# Patient Record
Sex: FEMALE | NOT HISPANIC OR LATINO | Employment: OTHER | URBAN - METROPOLITAN AREA
[De-identification: names, ages, dates, MRNs, and addresses within clinical notes are randomized per-mention and may not be internally consistent; named-entity substitution may affect disease eponyms.]

---

## 2017-03-07 DIAGNOSIS — E11.9 TYPE 2 DIABETES MELLITUS WITHOUT COMPLICATION, WITHOUT LONG-TERM CURRENT USE OF INSULIN (H): Primary | ICD-10-CM

## 2017-03-07 NOTE — TELEPHONE ENCOUNTER
"Received note from pharmacy. \"for medicare B rx must specify QTY. I.I #100, frequency of resting & dx code. Please send in new rx with specific qty.\"      blood glucose monitoring (BETSY CONTOUR NEXT) test strip       Last Written Prescription Date: 8/22/16  Last Fill Quantity: 1, # refills: 11  Last Office Visit with [facility], [facility] or [facility] prescribing provider:  9/1/16        BP Readings from Last 3 Encounters:   09/01/16 105/60   08/22/16 110/80   01/28/16 125/81     Lab Results   Component Value Date    MICROL 6 08/22/2016     No results found for: MICROALBUMIN  Creatinine   Date Value Ref Range Status   08/22/2016 0.69 0.52 - 1.04 mg/dL Final   ]  GFR Estimate   Date Value Ref Range Status   08/22/2016 85 >60 mL/min/1.7m2 Final     Comment:     Non  GFR Calc   07/07/2016 >90  Non  GFR Calc   >60 mL/min/1.7m2 Final   12/10/2015 >90  Non  GFR Calc   >60 mL/min/1.7m2 Final     GFR Estimate If Black   Date Value Ref Range Status   08/22/2016 >90   GFR Calc   >60 mL/min/1.7m2 Final   07/07/2016 >90   GFR Calc   >60 mL/min/1.7m2 Final   12/10/2015 >90   GFR Calc   >60 mL/min/1.7m2 Final     Lab Results   Component Value Date    CHOL 157 08/22/2016     Lab Results   Component Value Date    HDL 56 08/22/2016     Lab Results   Component Value Date    LDL 82 08/22/2016     Lab Results   Component Value Date    TRIG 97 08/22/2016     Lab Results   Component Value Date    CHOLHDLRATIO 3.4 01/16/2015     Lab Results   Component Value Date    AST 28 08/22/2016     Lab Results   Component Value Date    ALT 33 08/22/2016     Lab Results   Component Value Date    A1C 6.5 07/07/2016    A1C 6.0 12/10/2015    A1C 6.9 06/08/2015    A1C 6.5 01/16/2015    A1C 6.6 04/29/2014     Potassium   Date Value Ref Range Status   08/22/2016 4.2 3.4 - 5.3 mmol/L Final       "

## 2017-05-23 DIAGNOSIS — M85.80 OSTEOPENIA: ICD-10-CM

## 2017-05-24 RX ORDER — ALENDRONATE SODIUM 70 MG/1
TABLET ORAL
Qty: 4 TABLET | Refills: 2 | Status: SHIPPED | OUTPATIENT
Start: 2017-05-24 | End: 2017-08-15

## 2017-05-24 NOTE — TELEPHONE ENCOUNTER
Routing refill request to provider for review/approval because:  Labs not current:  DEXA scan. Protocol asks for every 2 years.     alendronate (FOSAMAX) 70 MG tablet  Last Written Prescription Date: 12/23/2016  Last Fill Quantity: 4, # refills: 2  Last Office Visit with [facility], [facility] or [facility] prescribing provider: 9/1/2016     DEXA Scan:  Last order of DX HIP/PELVIS/SPINE was found on 6/11/2015 from [facility] Appointment on 6/11/2015     No order of DX HIP/PELVIS/SPINE W LAT FRACTION ANALYSIS is found.     Creatinine   Date Value Ref Range Status   08/22/2016 0.69 0.52 - 1.04 mg/dL Final     Tia ESTHER Cronin

## 2017-05-31 DIAGNOSIS — M54.50 BILATERAL LOW BACK PAIN WITHOUT SCIATICA, UNSPECIFIED CHRONICITY: ICD-10-CM

## 2017-05-31 DIAGNOSIS — M54.16 LUMBAR RADICULOPATHY: ICD-10-CM

## 2017-05-31 RX ORDER — IBUPROFEN 600 MG/1
TABLET, FILM COATED ORAL
Qty: 120 TABLET | Refills: 0 | Status: SHIPPED | OUTPATIENT
Start: 2017-05-31 | End: 2017-09-16

## 2017-05-31 NOTE — TELEPHONE ENCOUNTER
ibuprofen      Last Written Prescription Date: 9/12/16  Last Quantity: 120, # refills: 2  Last Office Visit with [facility], [facility] or [facility] prescribing provider:  9/1/16  Next 5 appointments (look out 90 days)     Jun 05, 2017  8:40 AM CDT   Return Visit with CAMILO Benavides CNP   [facility] ([facility]    [address] [place] [id number] [phone]                   Creatinine   Date Value Ref Range Status   08/22/2016 0.69 0.52 - 1.04 mg/dL Final     Lab Results   Component Value Date    AST 28 08/22/2016     Lab Results   Component Value Date    ALT 33 08/22/2016     BP Readings from Last 3 Encounters:   09/01/16 105/60   08/22/16 110/80   01/28/16 125/81     Medication filled for 3 months per protocol.      Monika Reddy RN, [facility]

## 2017-06-05 ENCOUNTER — OFFICE VISIT (OUTPATIENT)
Dept: PEDIATRICS | Facility: CLINIC | Age: 67
End: 2017-06-05
Payer: COMMERCIAL

## 2017-06-05 VITALS
BODY MASS INDEX: 26.75 KG/M2 | OXYGEN SATURATION: 97 % | SYSTOLIC BLOOD PRESSURE: 145 MMHG | WEIGHT: 141.7 LBS | HEART RATE: 67 BPM | HEIGHT: 61 IN | DIASTOLIC BLOOD PRESSURE: 80 MMHG | TEMPERATURE: 97 F

## 2017-06-05 DIAGNOSIS — E11.9 TYPE 2 DIABETES MELLITUS WITHOUT COMPLICATION, WITHOUT LONG-TERM CURRENT USE OF INSULIN (H): Primary | ICD-10-CM

## 2017-06-05 DIAGNOSIS — E78.5 HYPERLIPIDEMIA LDL GOAL <100: ICD-10-CM

## 2017-06-05 LAB
ANION GAP SERPL CALCULATED.3IONS-SCNC: 7 MMOL/L (ref 3–14)
BUN SERPL-MCNC: 13 MG/DL (ref 7–30)
CALCIUM SERPL-MCNC: 8.9 MG/DL (ref 8.5–10.1)
CHLORIDE SERPL-SCNC: 106 MMOL/L (ref 94–109)
CO2 SERPL-SCNC: 29 MMOL/L (ref 20–32)
CREAT SERPL-MCNC: 0.7 MG/DL (ref 0.52–1.04)
GFR SERPL CREATININE-BSD FRML MDRD: 83 ML/MIN/1.7M2
GLUCOSE SERPL-MCNC: 123 MG/DL (ref 70–99)
HBA1C MFR BLD: 6.4 % (ref 4.3–6)
POTASSIUM SERPL-SCNC: 4.9 MMOL/L (ref 3.4–5.3)
SODIUM SERPL-SCNC: 142 MMOL/L (ref 133–144)

## 2017-06-05 PROCEDURE — 83036 HEMOGLOBIN GLYCOSYLATED A1C: CPT | Performed by: NURSE PRACTITIONER

## 2017-06-05 PROCEDURE — 80048 BASIC METABOLIC PNL TOTAL CA: CPT | Performed by: NURSE PRACTITIONER

## 2017-06-05 PROCEDURE — 36415 COLL VENOUS BLD VENIPUNCTURE: CPT | Performed by: NURSE PRACTITIONER

## 2017-06-05 PROCEDURE — 99213 OFFICE O/P EST LOW 20 MIN: CPT | Performed by: NURSE PRACTITIONER

## 2017-06-05 RX ORDER — LANCETS
EACH MISCELLANEOUS
Qty: 200 EACH | Refills: 3 | Status: SHIPPED | OUTPATIENT
Start: 2017-06-05 | End: 2017-10-13

## 2017-06-05 NOTE — PATIENT INSTRUCTIONS
PLAN:   1.   Symptomatic therapy suggested: Continue current medications.  2.  Orders Placed This Encounter   Medications     MICROLET LANCETS MISC     Sig: USE TO TEST 1 TO 2 TIMES DAILY, AND AS NEEDED     Dispense:  200 each     Refill:  3     Orders Placed This Encounter   Procedures     Hemoglobin A1c     Basic metabolic panel       3. Patient needs to follow up in if no improvement,or sooner if worsening of symptoms or other symptoms develop.  FUTURE APPOINTMENTS:       - Make appointment with eye doctor  specialist  Schedule physical exam in September     It was a pleasure seeing you today at the [facility]. Thank you for allowing us to care for you today. We truly hope we provided you with the excellent service you deserve. Please let us know if there is anything else we can do for you so we can be sure you are leaving completley satisfied with your care experience.       General information about your clinic   Clinic Hours Lab Hours (Appointments are required)   Mon-Thurs: 7:30 AM - 7 PM Mon-Thurs: 7:30 AM - 7 PM   Fri: 7:30 AM - 5 PM Fri: 7:30 AM - 5 PM        After Hours Nurse Advise & Appts:  [facility] Nurse Advisors: [phone]  [facility] On Call: to make appointments anytime: [phone] On Call Physician: call [phone] and answering service will page the on call physician.        For urgent appointments, please call [phone] and ask for the triage nurse or your care team clinic nurse.  How to contact my care team:  MyChart: [email or web address]   Phone: [phone]   Fax: [phone]       [facility]:   Phone: [phone]  Hours: 8:00 AM - 6:00 PM  Medication Refills:  Call your pharmacy and they will forward the refill to us. Please allow 3 business days for your refills to be completed.       Normal or non-critical lab and imaging results will be communicated to you by MyChart, letter or phone within 7 days.  If you do not hear from us within 10  days, please call the clinic. If you have a critical or abnormal lab result, we will notify you by phone as soon as possible.       We now have PWIC (Pediatric Walk in Care)  Monday-Friday from 7:30-4. Simply walk in and be seen for your urgent needs like cough, fever, rash, diarrhea or vomiting, pink eye, UTI. No appointments needed. Ask one of the team for more information      -Your Care Team:    Dr. Tracey Salcedo - Internal Medicine/Pediatrics   Dr. Tory Jerome - Family Medicine  Dr. Shania Esquivel - Pediatrics  Cherie Covington CNP - Family Practice Nurse Practitioner  Dr. Em Hickman - Pediatrics  Dr. Michael Duque - Internal Medicine

## 2017-06-05 NOTE — PROGRESS NOTES
SUBJECTIVE:                                                    Rissa Acosta is a 67 year old female who presents to clinic today for the following health issues:  This patient is accompanied in the office by her .    Diabetes Follow-up    Patient is checking blood sugars: three times a week about twice   Blood sugar testing frequency justification: Patient modifying lifestyle changes (diet, exercise) with blood sugars  Results are as follows:         am - 100         lunchtime -          suppertime -     Diabetic concerns: none     Symptoms of hypoglycemia (low blood sugar): ringing in the ears     Paresthesias (numbness or burning in feet) or sores: No     Date of last diabetic eye exam: 9/1/2015       Amount of exercise or physical activity: walking everymorning    Problems taking medications regularly: No    Medication side effects: none    Diet: diabetic      Hyperlipidemia Follow-Up      Rate your low fat/cholesterol diet?: good    Taking statin?  Yes, no muscle aches from statin    Other lipid medications/supplements?:  none       Problem list and histories reviewed & adjusted, as indicated.  Additional history: as documented    Patient Active Problem List   Diagnosis     CARDIOVASCULAR SCREENING; LDL GOAL LESS THAN 160     DDD (degenerative disc disease), cervical     DDD (degenerative disc disease), lumbar     Vitamin D insufficiency     Elevated LFTs     Positive PPD     Bilateral low back pain without sciatica     Lumbar radiculopathy     Advanced directives, counseling/discussion     Closed fracture of lumbar vertebra (H)     Spinal stenosis of lumbar region     Viral hepatitis     Type 2 diabetes, HbA1c goal < 7% (H)     Osteopenia     Neuropathic pain, leg     Exposure to hepatitis B     Spinal stenosis of lumbar region without neurogenic claudication     Osteoporosis     Hyperlipidemia LDL goal <100     Past Surgical History:   Procedure Laterality Date     BACK SURGERY  2011     [facility]     CATARACT IOL, RT/LT Right 2005       Social History   Substance Use Topics     Smoking status: Never Smoker     Smokeless tobacco: Never Used     Alcohol use No     Family History   Problem Relation Age of Onset     DIABETES No family hx of      Macular Degeneration No family hx of      Glaucoma No family hx of      Strabismus No family hx of          Current Outpatient Prescriptions   Medication Sig Dispense Refill     ibuprofen (ADVIL/MOTRIN) 600 MG tablet TAKE ONE TABLET BY MOUTH EVERY 12 HOURS AS NEEDED FOR MODERATE PAIN 120 tablet 0     alendronate (FOSAMAX) 70 MG tablet TAKE 1 TAB BY MOUTH EVERY 7 DAYS 1 HR BEFORE BREAKFAST, WITH 8 OZ OF WATER. STAY UPRIGHT FOR 30 MIN 4 tablet 2     blood glucose monitoring (BETSY CONTOUR NEXT) test strip Use to test blood sugar 2 times daily or as directed. 100 strip 3     diclofenac (VOLTAREN) 1 % GEL topical gel APPLY 4 GRAMS TWICE DAILY USING ENCLOSED DOSING CARD. 100 g 4     MICROLET LANCETS MISC USE TO TEST 1 TO 2 TIMES DAILY, AND AS NEEDED 200 each 0     pravastatin (PRAVACHOL) 20 MG tablet Take 1 tablet (20 mg) by mouth daily 90 tablet 3     ORDER FOR DME Equipment being ordered: TENS 1 Units 0     Multiple Vitamins-Minerals (CHOICEFUL MULTIVITAMIN PO)        ascorbic acid (VITAMIN C) 500 MG tablet Take by mouth daily       B Complex Vitamins (VITAMIN-B COMPLEX PO)        Omega-3 Fatty Acids (FISH OIL CONCENTRATE PO)        ORDER FOR DME Equipment being ordered: walker  Wheeled and seated if needed (Patient not taking: Reported on 6/5/2017) 1 each 0     No Known Allergies  Recent Labs   Lab Test  08/22/16   1044  07/07/16   1126  12/10/15   0848  06/08/15   0835   09/27/13   0724   A1C   --   6.5*  6.0  6.9*   < >   --    LDL  82  131*  151*   --    < >  159*   HDL  56  52  71   --    < >  55   TRIG  97  143  100   --    < >  94   ALT  33  29  32  43   < >  54*   CR  0.69  0.62  0.60  0.64   < >  0.60   GFRESTIMATED  85  >90  Non   "American GFR Calc    >90  Non  GFR Calc    >90  Non  GFR Calc     < >  >90   GFRESTBLACK  >90   GFR Calc    >90   GFR Calc    >90   GFR Calc    >90   GFR Calc     < >  >90   POTASSIUM  4.2  4.3  4.0  4.1   < >  4.7   TSH  0.92   --    --    --    --   1.18    < > = values in this interval not displayed.      BP Readings from Last 3 Encounters:   06/05/17 145/80   09/01/16 105/60   08/22/16 110/80    Wt Readings from Last 3 Encounters:   06/05/17 141 lb 11.2 oz (64.3 kg)   09/01/16 139 lb 4.8 oz (63.2 kg)   08/22/16 136 lb 6.4 oz (61.9 kg)               Reviewed and updated as needed this visit by clinical staff  Tobacco  Allergies  Med Hx  Surg Hx  Fam Hx  Soc Hx      Reviewed and updated as needed this visit by Provider         ROS:  CONSTITUTIONAL:NEGATIVE for fever, chills, change in weight  INTEGUMENTARY/SKIN: NEGATIVE for worrisome rashes, moles or lesions  ENT/MOUTH: NEGATIVE for ear, mouth and throat problems  RESP:NEGATIVE for significant cough or SOB  CV: NEGATIVE for chest pain, palpitations or peripheral edema  GI: NEGATIVE for nausea, poor appetite and vomiting  MUSCULOSKELETAL: POSITIVE  for radicular pain into her right leg and is doing OK  and NEGATIVE for joint swelling  and joint warmth   NEURO: POSITIVE for radicular pain intermittently  and NEGATIVE for involuntary movements, gait disturbance and loss of consciousness  ENDOCRINE: POSITIVE  for HX diabetes and NEGATIVE for polydipsia, polyphagia and polyuria    OBJECTIVE:                                                    /80 (BP Location: Right arm, Patient Position: Chair, Cuff Size: Adult Regular)  Pulse 67  Temp 97  F (36.1  C) (Temporal)  Ht 5' 1\" (1.549 m)  Wt 141 lb 11.2 oz (64.3 kg)  SpO2 97%  Breastfeeding? No  BMI 26.77 kg/m2  Body mass index is 26.77 kg/(m^2).   Wt Readings from Last 4 Encounters:   06/05/17 141 lb 11.2 oz " (64.3 kg)   09/01/16 139 lb 4.8 oz (63.2 kg)   08/22/16 136 lb 6.4 oz (61.9 kg)   12/23/15 134 lb 9.6 oz (61.1 kg)       GENERAL: Patient is well nourished, well developed,in no apparent distress, non-toxic, in no respiratory distress and acyanotic, alert, cooperative and well hydrated  EYES: Eyes grossly normal to inspection  HENT:ear canals and TM's normal and nose and mouth without ulcers or lesions   NECK:normal, supple and no adenopathy  CARDIAC:regular rates and rhythm, no murmur, click or rub and no irregular beats  without LE edema bilaterally  RESP: normal respiratory rate and rhythm, lungs clear to auscultation  unlabored respirations, no intercostal retractions or accessory muscle use  ABD:soft, nontender  SKIN: Skin color, texture, turgor normal. No rashes or lesions.  MS: extremities normal- no gross deformities noted, gait normal and normal muscle tone  NEURO: mentation intact and speech normal  PSYCH: Alert, oriented, thought content appropriate,mentation appears normal., affect and mood normal      Diagnostic Test Results:  Results for orders placed or performed in visit on 06/05/17 (from the past 24 hour(s))   Hemoglobin A1c   Result Value Ref Range    Hemoglobin A1C 6.4 (H) 4.3 - 6.0 %        ASSESSMENT/PLAN:                                                      Rissa was seen today for diabetes.    Diagnoses and all orders for this visit:    Type 2 diabetes mellitus without complication, without long-term current use of insulin (H)  -     MICROLET LANCETS MISC; USE TO TEST 1 TO 2 TIMES DAILY, AND AS NEEDED  -     Hemoglobin A1c  -     Basic metabolic panel  foot care discussed and Podiatry visits discussed, annual eye examinations at Ophthalmology discussed, glycohemoglobin monitoring discussed and long term diabetic complications discussed  No changes in current regimen  Referral to Ophthalmology    Hyperlipidemia LDL goal <100  Continue current medications.    PLAN:   Patient needs to follow up in  if no improvement,or sooner if worsening of symptoms or other symptoms develop.  FUTURE APPOINTMENTS:       - Make appointment with eye doctor  specialist  Schedule physical exam in September       See Patient Instructions    CAMILO Benavides CNP  [facility]

## 2017-06-05 NOTE — MR AVS SNAPSHOT
After Visit Summary   6/5/2017    Rissa Acosta    MRN: [id number]           Patient Information     Date Of Birth          1950        Visit Information        Provider Department      6/5/2017 8:30 AM Cherie Covington APRN CNP; [employer] [facility]        Today's Diagnoses     Type 2 diabetes mellitus without complication, without long-term current use of insulin (H)    -  1    Hyperlipidemia LDL goal <100          Care Instructions    PLAN:   1.   Symptomatic therapy suggested: Continue current medications.  2.  Orders Placed This Encounter   Medications     MICROLET LANCETS MISC     Sig: USE TO TEST 1 TO 2 TIMES DAILY, AND AS NEEDED     Dispense:  200 each     Refill:  3     Orders Placed This Encounter   Procedures     Hemoglobin A1c     Basic metabolic panel       3. Patient needs to follow up in if no improvement,or sooner if worsening of symptoms or other symptoms develop.  FUTURE APPOINTMENTS:       - Make appointment with eye doctor  specialist  Schedule physical exam in September     It was a pleasure seeing you today at the [facility]. Thank you for allowing us to care for you today. We truly hope we provided you with the excellent service you deserve. Please let us know if there is anything else we can do for you so we can be sure you are leaving completley satisfied with your care experience.       General information about your clinic   Clinic Hours Lab Hours (Appointments are required)   Mon-Thurs: 7:30 AM - 7 PM Mon-Thurs: 7:30 AM - 7 PM   Fri: 7:30 AM - 5 PM Fri: 7:30 AM - 5 PM        After Hours Nurse Advise & Appts:  [facility] Nurse Advisors: [phone]  [facility] On Call: to make appointments anytime: [phone] On Call Physician: call [phone] and answering service will page the on call physician.        For urgent appointments, please call [phone] and ask for the triage nurse or your care  team clinic nurse.  How to contact my care team:  [facility]: [email or web address]   Phone: [phone]   Fax: [phone]       [facility]:   Phone: [phone]  Hours: 8:00 AM - 6:00 PM  Medication Refills:  Call your pharmacy and they will forward the refill to us. Please allow 3 business days for your refills to be completed.       Normal or non-critical lab and imaging results will be communicated to you by MyChart, letter or phone within 7 days.  If you do not hear from us within 10 days, please call the clinic. If you have a critical or abnormal lab result, we will notify you by phone as soon as possible.       We now have PWIC (Pediatric Walk in Care)  Monday-Friday from 7:30-4. Simply walk in and be seen for your urgent needs like cough, fever, rash, diarrhea or vomiting, pink eye, UTI. No appointments needed. Ask one of the team for more information      -Your Care Team:    Dr. Tracey Salcedo - Internal Medicine/Pediatrics   Dr. Tory Jerome - Family Medicine  Dr. Shania Esquivel - Pediatrics  Cherie Covington CNP - Family Practice Nurse Practitioner  Dr. Em Hickman - Pediatrics  Dr. Michael Duque - Internal Medicine                      Follow-ups after your visit        Who to contact     If you have questions or need follow up information about today's clinic visit or your schedule please contact [facility] directly at [phone].  Normal or non-critical lab and imaging results will be communicated to you by MyChart, letter or phone within 4 business days after the clinic has received the results. If you do not hear from us within 7 days, please contact the clinic through [employer] or phone. If you have a critical or abnormal lab result, we will notify you by phone as soon as possible.  Submit refill requests through [employer] or call your pharmacy and they will forward the refill request to us. Please allow 3 business days for your refill to be completed.          Additional  "Information About Your Visit        [employer] [employer] [employer] Information     [employer] gives you secure access to your electronic health record. If you see a primary care provider, you can also send messages to your care team and make appointments. If you have questions, please call your primary care clinic.  If you do not have a primary care provider, please call [phone] and they will assist you.      [employer] is an electronic gateway that provides easy, online access to your medical records. With [employer], you can request a clinic appointment, read your test results, renew a prescription or communicate with your care team.     To access your existing account, please contact your [facility] or call [phone] for assistance.        Care EveryWhere ID     This is your Care EveryWhere ID. This could be used by other organizations to access your [facility] medical records  [id number]        Your Vitals Were     Pulse Temperature Height Pulse Oximetry Breastfeeding? BMI (Body Mass Index)    67 97  F (36.1  C) (Temporal) 5' 1\" (1.549 m) 97% No 26.77 kg/m2       Blood Pressure from Last 3 Encounters:   06/05/17 145/80   09/01/16 105/60   08/22/16 110/80    Weight from Last 3 Encounters:   06/05/17 141 lb 11.2 oz (64.3 kg)   09/01/16 139 lb 4.8 oz (63.2 kg)   08/22/16 136 lb 6.4 oz (61.9 kg)              We Performed the Following     Basic metabolic panel     Hemoglobin A1c          Today's Medication Changes          These changes are accurate as of: 6/5/17  9:15 AM.  If you have any questions, ask your nurse or doctor.               These medicines have changed or have updated prescriptions.        Dose/Directions    MICROLET LANCETS Misc   This may have changed:  See the new instructions.   Used for:  Type 2 diabetes mellitus without complication, without long-term current use of insulin (H)   Changed by:  Cherie Covington APRN CNP        USE TO TEST 1 TO 2 TIMES DAILY, AND AS NEEDED   Quantity: "  200 each   Refills:  3            Where to get your medicines      These medications were sent to [facility]/pharmacy #[id number] - [place], [place] - [address]  [address] [facility] [address]     Phone:  [phone]     MICROLET LANCETS [facility]                Primary Care Provider Office Phone # Fax #    Cherie Covington, CAMILO [phone]       [facility] [address]  [place] [address]        Thank you!     Thank you for choosing [facility]  for your care. Our goal is always to provide you with excellent care. Hearing back from our patients is one way we can continue to improve our services. Please take a few minutes to complete the written survey that you may receive in the mail after your visit with us. Thank you!             Your Updated Medication List - Protect others around you: Learn how to safely use, store and throw away your medicines at www.disposemymeds.org.          This list is accurate as of: 6/5/17  9:15 AM.  Always use your most recent med list.                   Brand Name Dispense Instructions for use    alendronate 70 MG tablet    FOSAMAX    4 tablet    TAKE 1 TAB BY MOUTH EVERY 7 DAYS 1 HR BEFORE BREAKFAST, WITH 8 OZ OF WATER. STAY UPRIGHT FOR 30 MIN       ascorbic acid 500 MG tablet    VITAMIN C     Take by mouth daily       blood glucose monitoring test strip    BETSY CONTOUR NEXT    100 strip    Use to test blood sugar 2 times daily or as directed.       CHOICEFUL MULTIVITAMIN PO          diclofenac 1 % Gel topical gel    VOLTAREN    100 g    APPLY 4 GRAMS TWICE DAILY USING ENCLOSED DOSING CARD.       FISH OIL CONCENTRATE PO          ibuprofen 600 MG tablet    ADVIL/MOTRIN    120 tablet    TAKE ONE TABLET BY MOUTH EVERY 12 HOURS AS NEEDED FOR MODERATE PAIN       MICROLET LANCETS Misc     200 each    USE TO TEST 1 TO 2 TIMES DAILY, AND AS NEEDED       * order for DME     1 Units    Equipment being ordered: TENS       *  order for DME     1 each    Equipment being ordered: walker  Wheeled and seated if needed       pravastatin 20 MG tablet    PRAVACHOL    90 tablet    Take 1 tablet (20 mg) by mouth daily       VITAMIN-B COMPLEX PO          * Notice:  This list has 2 medication(s) that are the same as other medications prescribed for you. Read the directions carefully, and ask your doctor or other care provider to review them with you.

## 2017-06-05 NOTE — NURSING NOTE
"Chief Complaint   Patient presents with     Diabetes       Initial /80 (BP Location: Right arm, Patient Position: Chair, Cuff Size: Adult Regular)  Pulse 67  Temp 97  F (36.1  C) (Temporal)  Ht 5' 1\" (1.549 m)  Wt 141 lb 11.2 oz (64.3 kg)  SpO2 97%  Breastfeeding? No  BMI 26.77 kg/m2 Estimated body mass index is 26.77 kg/(m^2) as calculated from the following:    Height as of this encounter: 5' 1\" (1.549 m).    Weight as of this encounter: 141 lb 11.2 oz (64.3 kg).  Medication Reconciliation: complete      CHRIS Holt      "

## 2017-06-05 NOTE — PROGRESS NOTES
Kishor Acosta,    Attached are your test results.  -Kidney function is normal (Cr, GFR), Sodium is normal, Potassium is normal, Calcium is normal,   -A1C (test of diabetes control the last 2-3 months) is at your goal. Please continue with current plan. Also, see me and recheck your A1C test in 6 months.    Please contact us if you have any questions.    Cherie Covington, CNP

## 2017-07-10 ENCOUNTER — OFFICE VISIT (OUTPATIENT)
Dept: OPHTHALMOLOGY | Facility: CLINIC | Age: 67
End: 2017-07-10
Attending: OPHTHALMOLOGY
Payer: COMMERCIAL

## 2017-07-10 DIAGNOSIS — H04.123 DRY EYES, BILATERAL: ICD-10-CM

## 2017-07-10 DIAGNOSIS — H25.13 AGE-RELATED NUCLEAR CATARACT OF BOTH EYES: ICD-10-CM

## 2017-07-10 DIAGNOSIS — H52.4 PRESBYOPIA: Primary | ICD-10-CM

## 2017-07-10 DIAGNOSIS — H52.7 REFRACTIVE ERROR: ICD-10-CM

## 2017-07-10 PROCEDURE — 92015 DETERMINE REFRACTIVE STATE: CPT | Mod: ZF

## 2017-07-10 PROCEDURE — 99214 OFFICE O/P EST MOD 30 MIN: CPT | Mod: ZF

## 2017-07-10 ASSESSMENT — VISUAL ACUITY
CORRECTION_TYPE: GLASSES
OD_PH_CC: 20/30
METHOD: SNELLEN - LINEAR
OS_CC: 20/40
OD_CC: J1+
OD_CC: 20/40
OS_CC: J2

## 2017-07-10 ASSESSMENT — REFRACTION_WEARINGRX
OD_SPHERE: +3.00
OS_CYLINDER: +0.75
OD_CYLINDER: +0.75
OD_AXIS: 157
OS_AXIS: 037
SPECS_TYPE: BIFOCAL
OD_ADD: +3.00
OS_SPHERE: +3.25
OS_ADD: +3.00

## 2017-07-10 ASSESSMENT — REFRACTION_MANIFEST
OS_SPHERE: +3.50
OD_AXIS: 155
OS_AXIS: 040
OD_SPHERE: +3.50
OD_CYLINDER: +0.75
OS_ADD: +2.50
OD_ADD: +2.50
OS_CYLINDER: +0.50

## 2017-07-10 ASSESSMENT — EXTERNAL EXAM - LEFT EYE: OS_EXAM: NORMAL

## 2017-07-10 ASSESSMENT — CUP TO DISC RATIO
OS_RATIO: 0.3
OD_RATIO: 0.2

## 2017-07-10 ASSESSMENT — TONOMETRY
IOP_METHOD: TONOPEN
OS_IOP_MMHG: 12
OD_IOP_MMHG: 11

## 2017-07-10 ASSESSMENT — EXTERNAL EXAM - RIGHT EYE: OD_EXAM: NORMAL

## 2017-07-10 ASSESSMENT — CONF VISUAL FIELD
OS_NORMAL: 1
OD_NORMAL: 1

## 2017-07-10 ASSESSMENT — SLIT LAMP EXAM - LIDS
COMMENTS: DERMATOCHALASIS
COMMENTS: DERMATOCHALASIS

## 2017-07-10 NOTE — NURSING NOTE
Chief Complaints and History of Present Illnesses   Patient presents with     COMPREHENSIVE EYE EXAM     HPI    Affected eye(s):  Both   Symptoms:     No blurred vision   No decreased vision   No floaters   No flashes   No tearing   No Dryness      Duration:  2 years   Frequency:  Constant       Do you have eye pain now?:  No      Comments:  States va is the same since last visit.                        A1C                      6.4                 06/05/2017                 A1C                      6.5                 07/07/2016                 A1C                      6.0                 12/10/2015                 A1C                      6.9                 06/08/2015                 A1C                      6.5                 01/16/2015    BS: 100-150            Kane Camilo  10:10 AM July 10, 2017

## 2017-07-10 NOTE — PROGRESS NOTES
HPI:  Rissa Acosta is a 67 year old female who presents for annual dilated exam. She has a history of hyperopia and DM managed with diet/exercise. Her last A1C was 6.4 on 6/5/17. She is complaining of slightly blurry vision, unable to elucidate at near or distance. She is complaining of dryness/itching. Improved with AT. Denies redness or tearing. Denies flashes or floaters.     Patient does not drive. She is able to complete all of her ADLs.      POHx: hyperopia  PMHx: back pain, spinal stenosis, vitamin D deficiency, HLD  Current Medications:   Current Outpatient Prescriptions on File Prior to Visit:  MICROLET LANCETS MISC USE TO TEST 1 TO 2 TIMES DAILY, AND AS NEEDED   ibuprofen (ADVIL/MOTRIN) 600 MG tablet TAKE ONE TABLET BY MOUTH EVERY 12 HOURS AS NEEDED FOR MODERATE PAIN   alendronate (FOSAMAX) 70 MG tablet TAKE 1 TAB BY MOUTH EVERY 7 DAYS 1 HR BEFORE BREAKFAST, WITH 8 OZ OF WATER. STAY UPRIGHT FOR 30 MIN   blood glucose monitoring (BETSY CONTOUR NEXT) test strip Use to test blood sugar 2 times daily or as directed.   diclofenac (VOLTAREN) 1 % GEL topical gel APPLY 4 GRAMS TWICE DAILY USING ENCLOSED DOSING CARD.   pravastatin (PRAVACHOL) 20 MG tablet Take 1 tablet (20 mg) by mouth daily   ORDER FOR DME Equipment being ordered: walker  Wheeled and seated if needed   ORDER FOR DME Equipment being ordered: TENS   Multiple Vitamins-Minerals (CHOICEFUL MULTIVITAMIN PO)    ascorbic acid (VITAMIN C) 500 MG tablet Take by mouth daily   B Complex Vitamins (VITAMIN-B COMPLEX PO)    Omega-3 Fatty Acids (FISH OIL CONCENTRATE PO)      No current facility-administered medications on file prior to visit.   FHx: family history of glaucoma (1 sister)  PSHx: ?eye surgery in [place]      Current Eye Medications:  None    Assessment & Plan:  (H52.4) Hyperopia with Presbyopia and astigmatism (primary encounter diagnosis)  Comment: BCVA 20/30; 20/20  Plan: updated MRx dispensed    (H25.13) Age-related nuclear cataract of both  eyes  Comment: BCVA 20/30; 20/20  Plan: likely becoming visually significant  Continue to monitor    Dry eyes  Continue artificial tears four times daily    Disposition:  Return in about 1 year (around 7/10/2018) for Annual Visit.    Patient discussed with faculty MD Noah Healy MD  PGY2, Dept of Ophthalmology  Pager [phone]      Teaching statement:  Complete documentation of historical and exam elements from today's encounter can be found in the full encounter summary report (not reduplicated in this progress note). I personally obtained the chief complaint(s) and history of present illness.  I confirmed and edited as necessary the review of systems, past medical/surgical history, family history, social history, and examination findings as documented by others; and I examined the patient myself. I personally reviewed the relevant tests, images, and reports as documented above.     I formulated and edited as necessary the assessment and plan and discussed the findings and management plan with the patient and family.    Sabi Rosales MD  Comprehensive Ophthalmology & Ocular Pathology  Department of Ophthalmology and Visual Neurosciences  [email or web address]  Pager [phone]

## 2017-07-10 NOTE — MR AVS SNAPSHOT
After Visit Summary   7/10/2017    Rissa Acosta    MRN: [id number]           Patient Information     Date Of Birth          1950        Visit Information        Provider Department      7/10/2017 9:15 AM Open, Assignments; [facility] MD [facility]        Today's Diagnoses     Presbyopia    -  1    Refractive error        Age-related nuclear cataract of both eyes        Dry eyes, bilateral           Follow-ups after your visit        Follow-up notes from your care team     Return in about 1 year (around 7/10/2018) for Annual Visit.      Who to contact     Please call your clinic at [phone] to:    Ask questions about your health    Make or cancel appointments    Discuss your medicines    Learn about your test results    Speak to your doctor   If you have compliments or concerns about an experience at your clinic, or if you wish to file a complaint, please contact [facility] Patient Relations at [phone] or email us at [email or web address]         Additional Information About Your Visit        MyChart Information     [employer] gives you secure access to your electronic health record. If you see a primary care provider, you can also send messages to your care team and make appointments. If you have questions, please call your primary care clinic.  If you do not have a primary care provider, please call [phone] and they will assist you.      [employer]less is an electronic gateway that provides easy, online access to your medical records. With [employer], you can request a clinic appointment, read your test results, renew a prescription or communicate with your care team.     To access your existing account, please contact your [facility] or call [phone] for assistance.        Care EveryWhere ID     This is your Care EveryWhere ID. This could be used by other organizations to access your [facility]  records  [id number]         Blood Pressure from Last 3 Encounters:   06/05/17 145/80   09/01/16 105/60   08/22/16 110/80    Weight from Last 3 Encounters:   06/05/17 64.3 kg (141 lb 11.2 oz)   09/01/16 63.2 kg (139 lb 4.8 oz)   08/22/16 61.9 kg (136 lb 6.4 oz)              Today, you had the following     No orders found for display       Primary Care Provider Office Phone # Fax #    Cherie Covington, APRN [phone]       [facility] [address]  [place] [address]        Equal Access to Services     MARIVEL RENEE : Hadii aad wally hadasho Sogucciali, waaxda luqadaha, qaybta kaalmada adeegyada, jayson jordan . So [facility] [phone].    ATENCIÓN: Si habla español, tiene a espinoza disposición servicios gratuitos de asistencia lingüística. Llame al [phone].    We comply with applicable federal civil rights laws and [place] laws. We do not discriminate on the basis of race, color, national origin, age, disability sex, sexual orientation or gender identity.            Thank you!     Thank you for choosing EYE CLINIC  for your care. Our goal is always to provide you with excellent care. Hearing back from our patients is one way we can continue to improve our services. Please take a few minutes to complete the written survey that you may receive in the mail after your visit with us. Thank you!             Your Updated Medication List - Protect others around you: Learn how to safely use, store and throw away your medicines at www.disposemymeds.org.          This list is accurate as of: 7/10/17 11:15 AM.  Always use your most recent med list.                   Brand Name Dispense Instructions for use Diagnosis    alendronate 70 MG tablet    FOSAMAX    4 tablet    TAKE 1 TAB BY MOUTH EVERY 7 DAYS 1 HR BEFORE BREAKFAST, WITH 8 OZ OF WATER. STAY UPRIGHT FOR 30 MIN    Osteopenia       ascorbic acid 500 MG tablet    VITAMIN C     Take by mouth daily    Lumbar  radiculopathy, LBP (low back pain), Chronic pain disorder, Neuropathic pain, leg, unspecified laterality, Closed fracture of lumbar vertebra, sequela, Lumbar radicular pain, Right leg pain       blood glucose monitoring test strip    BETSY CONTOUR NEXT    100 strip    Use to test blood sugar 2 times daily or as directed.    Type 2 diabetes mellitus without complication, without long-term current use of insulin (H)       CHOICEFUL MULTIVITAMIN PO       Lumbar radiculopathy, LBP (low back pain), Chronic pain disorder, Neuropathic pain, leg, unspecified laterality, Closed fracture of lumbar vertebra, sequela, Lumbar radicular pain, Right leg pain       diclofenac 1 % Gel topical gel    VOLTAREN    100 g    APPLY 4 GRAMS TWICE DAILY USING ENCLOSED DOSING CARD.    DDD (degenerative disc disease), cervical, DDD (degenerative disc disease), lumbar       FISH OIL CONCENTRATE PO       Lumbar radiculopathy, LBP (low back pain), Chronic pain disorder, Neuropathic pain, leg, unspecified laterality, Closed fracture of lumbar vertebra, sequela, Lumbar radicular pain, Right leg pain       ibuprofen 600 MG tablet    ADVIL/MOTRIN    120 tablet    TAKE ONE TABLET BY MOUTH EVERY 12 HOURS AS NEEDED FOR MODERATE PAIN    Bilateral low back pain without sciatica, unspecified chronicity, Lumbar radiculopathy       MICROLET LANCETS Misc     200 each    USE TO TEST 1 TO 2 TIMES DAILY, AND AS NEEDED    Type 2 diabetes mellitus without complication, without long-term current use of insulin (H)       * order for DME     1 Units    Equipment being ordered: TENS    Neuropathic pain, leg, unspecified laterality, LBP (low back pain), Lumbar radiculopathy       * order for DME     1 each    Equipment being ordered: walker  Wheeled and seated if needed    LBP (low back pain), DDD (degenerative disc disease), lumbar, Neuropathic pain, leg, unspecified laterality       pravastatin 20 MG tablet    PRAVACHOL    90 tablet    Take 1 tablet (20 mg) by mouth  daily    Hyperlipidemia LDL goal <100       VITAMIN-B COMPLEX PO       Lumbar radiculopathy, LBP (low back pain), Chronic pain disorder, Neuropathic pain, leg, unspecified laterality, Closed fracture of lumbar vertebra, sequela, Lumbar radicular pain, Right leg pain       * Notice:  This list has 2 medication(s) that are the same as other medications prescribed for you. Read the directions carefully, and ask your doctor or other care provider to review them with you.

## 2017-08-15 DIAGNOSIS — M81.0 AGE-RELATED OSTEOPOROSIS WITHOUT CURRENT PATHOLOGICAL FRACTURE: Primary | ICD-10-CM

## 2017-08-16 RX ORDER — ALENDRONATE SODIUM 70 MG/1
TABLET ORAL
Qty: 4 TABLET | Refills: 2 | Status: SHIPPED | OUTPATIENT
Start: 2017-08-16 | End: 2017-10-13

## 2017-08-16 NOTE — TELEPHONE ENCOUNTER
Routing refill request to provider for review/approval because:  Labs not current:  DEXA scan    alendronate (FOSAMAX) 70 MG tablet  Last Written Prescription Date: 5/24/2017  Last Fill Quantity: 4, # refills: 2  Last Office Visit with [facility], [facility] or [facility] prescribing provider: 6/5/2017     DEXA Scan:  Last order of DX HIP/PELVIS/SPINE was found on 6/11/2015 from [facility] Appointment on 6/11/2015     No order of DX HIP/PELVIS/SPINE W LAT FRACTION ANALYSIS is found.     Creatinine   Date Value Ref Range Status   06/05/2017 0.70 0.52 - 1.04 mg/dL Final       Tia ESTHER Cronin

## 2017-08-16 NOTE — TELEPHONE ENCOUNTER
Refill completed.   Please double check with patient how long total she has been on the Fosamax and also placed order for repeat DEXA   Thanks

## 2017-08-16 NOTE — TELEPHONE ENCOUNTER
Patients daughter Mojgan advised of the information below per Liane Covington.  Mojgan states she has no idea how long her mom has been on Fosamax.  Mojgan states her mom would have no idea either.  Mojgan will call radiology to schedule DEXA, number given.    Annie [facility]    Message routed to Liane Covington as an FYI

## 2017-09-13 ENCOUNTER — RADIANT APPOINTMENT (OUTPATIENT)
Dept: MAMMOGRAPHY | Facility: CLINIC | Age: 67
End: 2017-09-13
Attending: NURSE PRACTITIONER
Payer: COMMERCIAL

## 2017-09-13 DIAGNOSIS — Z12.39 SCREENING FOR BREAST CANCER: ICD-10-CM

## 2017-09-13 DIAGNOSIS — E78.5 HYPERLIPIDEMIA LDL GOAL <100: ICD-10-CM

## 2017-09-13 PROCEDURE — G0202 SCR MAMMO BI INCL CAD: HCPCS | Performed by: STUDENT IN AN ORGANIZED HEALTH CARE EDUCATION/TRAINING PROGRAM

## 2017-09-13 RX ORDER — PRAVASTATIN SODIUM 20 MG
TABLET ORAL
Qty: 30 TABLET | Refills: 0 | Status: SHIPPED | OUTPATIENT
Start: 2017-09-13 | End: 2017-10-13

## 2017-09-13 NOTE — LETTER
September 13, 2017      Rissa Acosta  [address] [place] [address]  [address]  [facility] [address]              Dear Rissa,      Thank you for your refill request. We recently received a call from your pharmacy requesting a refill of your medication. We have provided a 30 day refill of your medication, pravastatin (PRAVACHOL) 20 MG tablet, as requested.      However, our records show it is time for annual laboratory monitoring with Cherie Covington CNP to ensure your treatment is safe and effective for you.      Regular follow up, including visits with your health care provider and laboratory monitoring are necessary to make sure your medication is working properly.    You can reach us at [phone] or schedule online via [employer].    Thank you for taking an active role in your healthcare.    Sincerely,  Cally Cronin RN,   [facility]  Cherie Covington MD

## 2017-09-13 NOTE — TELEPHONE ENCOUNTER
pravastatin (PRAVACHOL) 20 MG tablet   Last Written Prescription Date: 8/22/2016  Last Fill Quantity: 90, # refills: 3  Last Office Visit with [facility] or [facility] prescribing provider: 6/5/2017       Lab Results   Component Value Date    CHOL 157 08/22/2016     Lab Results   Component Value Date    HDL 56 08/22/2016     Lab Results   Component Value Date    LDL 82 08/22/2016     Lab Results   Component Value Date    TRIG 97 08/22/2016     Lab Results   Component Value Date    CHOLHDLRATIO 3.4 01/16/2015     30 day kaitlynn refill and letter sent to patient. Cally Cronin RN

## 2017-09-16 DIAGNOSIS — M54.16 LUMBAR RADICULOPATHY: ICD-10-CM

## 2017-09-16 DIAGNOSIS — M54.50 BILATERAL LOW BACK PAIN WITHOUT SCIATICA, UNSPECIFIED CHRONICITY: ICD-10-CM

## 2017-09-16 NOTE — LETTER
September 18, 2017      Rissa Acosta  [address]  [address]  [place] [facility] [address]              Dear Rissa,      Thank you for your refill request. We recently received a call from your pharmacy requesting a refill of your medication. We have provided a 30 day refill of your medication, ibuprofen (ADVIL/MOTRIN) 600 MG tablet, as requested.      However, our records show it is time for annual laboratory monitoring with Cherie Covington CNP to ensure your treatment is safe and effective for you.      Regular follow up, including visits with your health care provider and laboratory monitoring are necessary to make sure your medication is working properly.    You can reach us at [phone] or schedule online via [employer].    Thank you for taking an active role in your healthcare.    Sincerely,  Cally Cronin RN,   [facility]  Cherie Covington MD

## 2017-09-18 RX ORDER — IBUPROFEN 600 MG/1
TABLET, FILM COATED ORAL
Qty: 60 TABLET | Refills: 0 | Status: SHIPPED | OUTPATIENT
Start: 2017-09-18 | End: 2017-10-13

## 2017-09-18 NOTE — TELEPHONE ENCOUNTER
ibuprofen (ADVIL/MOTRIN) 600 MG tablet    Last Written Prescription Date: 5/31/2017  Last Quantity: 120, # refills: 0  Last Office Visit with [facility] or [facility] prescribing provider: 6/5/2017       Creatinine   Date Value Ref Range Status   06/05/2017 0.70 0.52 - 1.04 mg/dL Final     Lab Results   Component Value Date    AST 28 08/22/2016     Lab Results   Component Value Date    ALT 33 08/22/2016     BP Readings from Last 3 Encounters:   06/05/17 145/80   09/01/16 105/60   08/22/16 110/80     30 day kaitlynn refill and letter sent to patient. Cally Cronin RN

## 2017-10-13 ENCOUNTER — OFFICE VISIT (OUTPATIENT)
Dept: PEDIATRICS | Facility: CLINIC | Age: 67
End: 2017-10-13
Payer: COMMERCIAL

## 2017-10-13 VITALS
BODY MASS INDEX: 26.41 KG/M2 | TEMPERATURE: 97.5 F | HEART RATE: 68 BPM | DIASTOLIC BLOOD PRESSURE: 80 MMHG | WEIGHT: 139.9 LBS | OXYGEN SATURATION: 98 % | HEIGHT: 61 IN | SYSTOLIC BLOOD PRESSURE: 110 MMHG

## 2017-10-13 DIAGNOSIS — M54.41 BILATERAL LOW BACK PAIN WITH RIGHT-SIDED SCIATICA, UNSPECIFIED CHRONICITY: ICD-10-CM

## 2017-10-13 DIAGNOSIS — M54.16 LUMBAR RADICULOPATHY: ICD-10-CM

## 2017-10-13 DIAGNOSIS — E11.9 TYPE 2 DIABETES MELLITUS WITHOUT COMPLICATION, WITHOUT LONG-TERM CURRENT USE OF INSULIN (H): Primary | ICD-10-CM

## 2017-10-13 DIAGNOSIS — M81.0 AGE-RELATED OSTEOPOROSIS WITHOUT CURRENT PATHOLOGICAL FRACTURE: ICD-10-CM

## 2017-10-13 DIAGNOSIS — E78.5 HYPERLIPIDEMIA LDL GOAL <100: ICD-10-CM

## 2017-10-13 DIAGNOSIS — Z23 NEED FOR PROPHYLACTIC VACCINATION AND INOCULATION AGAINST INFLUENZA: ICD-10-CM

## 2017-10-13 LAB
ALBUMIN SERPL-MCNC: 4.1 G/DL (ref 3.4–5)
ALP SERPL-CCNC: 68 U/L (ref 40–150)
ALT SERPL W P-5'-P-CCNC: 51 U/L (ref 0–50)
ANION GAP SERPL CALCULATED.3IONS-SCNC: 5 MMOL/L (ref 3–14)
AST SERPL W P-5'-P-CCNC: 34 U/L (ref 0–45)
BILIRUB SERPL-MCNC: 0.8 MG/DL (ref 0.2–1.3)
BUN SERPL-MCNC: 12 MG/DL (ref 7–30)
CALCIUM SERPL-MCNC: 8.9 MG/DL (ref 8.5–10.1)
CHLORIDE SERPL-SCNC: 105 MMOL/L (ref 94–109)
CHOLEST SERPL-MCNC: 206 MG/DL
CO2 SERPL-SCNC: 29 MMOL/L (ref 20–32)
CREAT SERPL-MCNC: 0.69 MG/DL (ref 0.52–1.04)
CREAT UR-MCNC: 60 MG/DL
ERYTHROCYTE [DISTWIDTH] IN BLOOD BY AUTOMATED COUNT: 14.6 % (ref 10–15)
GFR SERPL CREATININE-BSD FRML MDRD: 84 ML/MIN/1.7M2
GLUCOSE SERPL-MCNC: 106 MG/DL (ref 70–99)
HBA1C MFR BLD: 6.1 % (ref 4.3–6)
HCT VFR BLD AUTO: 40.5 % (ref 35–47)
HDLC SERPL-MCNC: 59 MG/DL
HGB BLD-MCNC: 13.9 G/DL (ref 11.7–15.7)
LDLC SERPL CALC-MCNC: 121 MG/DL
MCH RBC QN AUTO: 28 PG (ref 26.5–33)
MCHC RBC AUTO-ENTMCNC: 34.3 G/DL (ref 31.5–36.5)
MCV RBC AUTO: 82 FL (ref 78–100)
MICROALBUMIN UR-MCNC: <5 MG/L
MICROALBUMIN/CREAT UR: NORMAL MG/G CR (ref 0–25)
NONHDLC SERPL-MCNC: 147 MG/DL
PLATELET # BLD AUTO: 230 10E9/L (ref 150–450)
POTASSIUM SERPL-SCNC: 4.4 MMOL/L (ref 3.4–5.3)
PROT SERPL-MCNC: 8.4 G/DL (ref 6.8–8.8)
RBC # BLD AUTO: 4.96 10E12/L (ref 3.8–5.2)
SODIUM SERPL-SCNC: 139 MMOL/L (ref 133–144)
TRIGL SERPL-MCNC: 128 MG/DL
TSH SERPL DL<=0.005 MIU/L-ACNC: 0.56 MU/L (ref 0.4–4)
WBC # BLD AUTO: 7.4 10E9/L (ref 4–11)

## 2017-10-13 PROCEDURE — 85027 COMPLETE CBC AUTOMATED: CPT | Performed by: NURSE PRACTITIONER

## 2017-10-13 PROCEDURE — 80061 LIPID PANEL: CPT | Performed by: NURSE PRACTITIONER

## 2017-10-13 PROCEDURE — 84443 ASSAY THYROID STIM HORMONE: CPT | Performed by: NURSE PRACTITIONER

## 2017-10-13 PROCEDURE — 99214 OFFICE O/P EST MOD 30 MIN: CPT | Mod: 25 | Performed by: NURSE PRACTITIONER

## 2017-10-13 PROCEDURE — 36415 COLL VENOUS BLD VENIPUNCTURE: CPT | Performed by: NURSE PRACTITIONER

## 2017-10-13 PROCEDURE — 83036 HEMOGLOBIN GLYCOSYLATED A1C: CPT | Performed by: NURSE PRACTITIONER

## 2017-10-13 PROCEDURE — G0008 ADMIN INFLUENZA VIRUS VAC: HCPCS | Performed by: NURSE PRACTITIONER

## 2017-10-13 PROCEDURE — 80053 COMPREHEN METABOLIC PANEL: CPT | Performed by: NURSE PRACTITIONER

## 2017-10-13 PROCEDURE — 90662 IIV NO PRSV INCREASED AG IM: CPT | Performed by: NURSE PRACTITIONER

## 2017-10-13 PROCEDURE — 82043 UR ALBUMIN QUANTITATIVE: CPT | Performed by: NURSE PRACTITIONER

## 2017-10-13 RX ORDER — IBUPROFEN 600 MG/1
TABLET, FILM COATED ORAL
Qty: 60 TABLET | Refills: 1 | Status: SHIPPED | OUTPATIENT
Start: 2017-10-13 | End: 2017-12-07

## 2017-10-13 RX ORDER — PRAVASTATIN SODIUM 20 MG
TABLET ORAL
Qty: 90 TABLET | Refills: 3 | Status: SHIPPED | OUTPATIENT
Start: 2017-10-13 | End: 2018-08-31

## 2017-10-13 RX ORDER — ALENDRONATE SODIUM 70 MG/1
TABLET ORAL
Qty: 4 TABLET | Refills: 3 | Status: SHIPPED | OUTPATIENT
Start: 2017-10-13 | End: 2018-02-18

## 2017-10-13 RX ORDER — LANCETS
EACH MISCELLANEOUS
Qty: 200 EACH | Refills: 3 | Status: SHIPPED | OUTPATIENT
Start: 2017-10-13 | End: 2018-08-16

## 2017-10-13 NOTE — PATIENT INSTRUCTIONS
PLAN:   1.   Symptomatic therapy suggested: Continue current medications.    2.  Orders Placed This Encounter   Medications     ibuprofen (ADVIL/MOTRIN) 600 MG tablet     Sig: TAKE ONE TABLET BY MOUTH EVERY 12 HOURS AS NEEDED FOR MODERATE PAIN     Dispense:  60 tablet     Refill:  1     pravastatin (PRAVACHOL) 20 MG tablet     Sig: TAKE 1 TABLET (20 MG) BY MOUTH DAILY     Dispense:  90 tablet     Refill:  3     alendronate (FOSAMAX) 70 MG tablet     Sig: TAKE 1 TAB BY MOUTH EVERY 7 DAYS 1 HR BEFORE BREAKFAST, WITH 8 OZ OF WATER. STAY UPRIGHT FOR 30 MIN     Dispense:  4 tablet     Refill:  3     MICROLET LANCETS MISC     Sig: USE TO TEST 1 TO 2 TIMES DAILY, AND AS NEEDED     Dispense:  200 each     Refill:  3     blood glucose monitoring (BETSY CONTOUR NEXT) test strip     Sig: Use to test blood sugar 2 times daily or as directed.     Dispense:  100 strip     Refill:  3     Orders Placed This Encounter   Procedures     MR Lumbar Spine w/o Contrast     FLU VACCINE, INCREASED ANTIGEN, PRESV FREE, AGE 65+ [73423]     ADMIN INFLUENZA (For MEDICARE Patients ONLY) []     Lipid panel reflex to direct LDL     Albumin Random Urine Quantitative with Creat Ratio     Comprehensive metabolic panel     Hemoglobin A1c     TSH with free T4 reflex     JUST IN CASE     CBC with platelets       3. Patient needs to follow up in if no improvement,or sooner if worsening of symptoms or other symptoms develop.  FURTHER TESTING:       - MRI lumbar spine  I will place order. Please call [phone] to schedule.  Will follow up and/or notify patient on results via phone or mail to determine further need for followup  Follow up in 6 months.    It was a pleasure seeing you today at the [facility]. Thank you for allowing us to care for you today. We truly hope we provided you with the excellent service you deserve. Please let us know if there is anything else we can do for you so we can be sure you are  leonid pena satisfied with your care experience.       General information about your clinic   Clinic Hours Lab Hours (Appointments are required)   Mon-Thurs: 7:30 AM - 7 PM Mon-Thurs: 7:30 AM - 7 PM   Fri: 7:30 AM - 5 PM Fri: 7:30 AM - 5 PM        After Hours Nurse Advise & Appts:  [facility] Nurse Advisors: [phone]  [facility] On Call: to make appointments anytime: [phone] On Call Physician: call [phone] and answering service will page the on call physician.        For urgent appointments, please call [phone] and ask for the triage nurse or your care team clinic nurse.  How to contact my care team:  MyChart: [email or web address]   Phone: [phone]   Fax: [phone]       [facility]:   Phone: [phone]  Hours: 8:00 AM - 6:00 PM  Medication Refills:  Call your pharmacy and they will forward the refill to us. Please allow 3 business days for your refills to be completed.       Normal or non-critical lab and imaging results will be communicated to you by MyChart, letter or phone within 7 days.  If you do not hear from us within 10 days, please call the clinic. If you have a critical or abnormal lab result, we will notify you by phone as soon as possible.       We now have PWIC (Pediatric Walk in Care)  Monday-Friday from 7:30-4. Simply walk in and be seen for your urgent needs like cough, fever, rash, diarrhea or vomiting, pink eye, UTI. No appointments needed. Ask one of the team for more information      -Your Care Team:    Dr. Tracey Salcedo - Internal Medicine/Pediatrics   Dr. Tory Jerome - Family Medicine  Dr. Shania Esquivel - Pediatrics  Dr. Em Hickman - Pediatrics  Cherie Covington CNP - Family Practice Nurse Practitioner

## 2017-10-13 NOTE — NURSING NOTE
"Chief Complaint   Patient presents with     Diabetes     Lipids     fasting today       Initial /80 (BP Location: Right arm, Patient Position: Sitting, Cuff Size: Adult Regular)  Pulse 68  Temp 97.5  F (36.4  C) (Temporal)  Ht 5' 1\" (1.549 m)  Wt 139 lb 14.4 oz (63.5 kg)  SpO2 98%  Breastfeeding? No  BMI 26.43 kg/m2 Estimated body mass index is 26.43 kg/(m^2) as calculated from the following:    Height as of this encounter: 5' 1\" (1.549 m).    Weight as of this encounter: 139 lb 14.4 oz (63.5 kg).  Medication Reconciliation: complete      CHRIS Holt      "

## 2017-10-13 NOTE — MR AVS SNAPSHOT
After Visit Summary   10/13/2017    Rissa Acosta    MRN: [id number]           Patient Information     Date Of Birth          1950        Visit Information        Provider Department      10/13/2017 9:45 AM Cherie Covington APRN CNP; [employer] [facility]        Today's Diagnoses     Type 2 diabetes mellitus without complication, without long-term current use of insulin (H)    -  1    Lumbar radiculopathy        Hyperlipidemia LDL goal <100        Age-related osteoporosis without current pathological fracture        Need for prophylactic vaccination and inoculation against influenza        Bilateral low back pain with right-sided sciatica, unspecified chronicity          Care Instructions    PLAN:   1.   Symptomatic therapy suggested: Continue current medications.    2.  Orders Placed This Encounter   Medications     ibuprofen (ADVIL/MOTRIN) 600 MG tablet     Sig: TAKE ONE TABLET BY MOUTH EVERY 12 HOURS AS NEEDED FOR MODERATE PAIN     Dispense:  60 tablet     Refill:  1     pravastatin (PRAVACHOL) 20 MG tablet     Sig: TAKE 1 TABLET (20 MG) BY MOUTH DAILY     Dispense:  90 tablet     Refill:  3     alendronate (FOSAMAX) 70 MG tablet     Sig: TAKE 1 TAB BY MOUTH EVERY 7 DAYS 1 HR BEFORE BREAKFAST, WITH 8 OZ OF WATER. STAY UPRIGHT FOR 30 MIN     Dispense:  4 tablet     Refill:  3     MICROLET LANCETS MISC     Sig: USE TO TEST 1 TO 2 TIMES DAILY, AND AS NEEDED     Dispense:  200 each     Refill:  3     blood glucose monitoring (BETSY CONTOUR NEXT) test strip     Sig: Use to test blood sugar 2 times daily or as directed.     Dispense:  100 strip     Refill:  3     Orders Placed This Encounter   Procedures     MR Lumbar Spine w/o Contrast     FLU VACCINE, INCREASED ANTIGEN, PRESV FREE, AGE 65+ [31001]     ADMIN INFLUENZA (For MEDICARE Patients ONLY) []     Lipid panel reflex to direct LDL     Albumin Random Urine Quantitative with Creat Ratio      Comprehensive metabolic panel     Hemoglobin A1c     TSH with free T4 reflex     JUST IN CASE     CBC with platelets       3. Patient needs to follow up in if no improvement,or sooner if worsening of symptoms or other symptoms develop.  FURTHER TESTING:       - MRI lumbar spine  I will place order. Please call [phone] to schedule.  Will follow up and/or notify patient on results via phone or mail to determine further need for followup  Follow up in 6 months.    It was a pleasure seeing you today at the [facility]. Thank you for allowing us to care for you today. We truly hope we provided you with the excellent service you deserve. Please let us know if there is anything else we can do for you so we can be sure you are leaving completley satisfied with your care experience.       General information about your clinic   Clinic Hours Lab Hours (Appointments are required)   Mon-Thurs: 7:30 AM - 7 PM Mon-Thurs: 7:30 AM - 7 PM   Fri: 7:30 AM - 5 PM Fri: 7:30 AM - 5 PM        After Hours Nurse Advise & Appts:  [facility]: [phone]  [facility] On Call: to make appointments anytime: [phone] On Call Physician: call [phone] and answering service will page the on call physician.        For urgent appointments, please call [phone] and ask for the triage nurse or your care team clinic nurse.  How to contact my care team:  [employer]: [email or web address]   Phone: [phone]   Fax: [phone]       [facility]:   Phone: [phone]  Hours: 8:00 AM - 6:00 PM  Medication Refills:  Call your pharmacy and they will forward the refill to us. Please allow 3 business days for your refills to be completed.       Normal or non-critical lab and imaging results will be communicated to you by MyChart, letter or phone within 7 days.  If you do not hear from us within 10 days, please call the clinic. If you have a critical or abnormal lab result, we will notify you  by phone as soon as possible.       We now have PWIC (Pediatric Walk in Care)  Monday-Friday from 7:30-4. Simply walk in and be seen for your urgent needs like cough, fever, rash, diarrhea or vomiting, pink eye, UTI. No appointments needed. Ask one of the team for more information      -Your Care Team:    Dr. Tracey Salcedo - Internal Medicine/Pediatrics   Dr. Tory Jerome - Family Medicine  Dr. Shania Esquivel - Pediatrics  Dr. Em Hickman - Pediatrics  Cherie Covington CNP - Family Practice Nurse Practitioner                         Follow-ups after your visit        Future tests that were ordered for you today     Open Future Orders        Priority Expected Expires Ordered    MR Lumbar Spine w/o Contrast Routine  10/13/2018 10/13/2017            Who to contact     If you have questions or need follow up information about today's clinic visit or your schedule please contact [facility] directly at [phone].  Normal or non-critical lab and imaging results will be communicated to you by [employer], letter or phone within 4 business days after the clinic has received the results. If you do not hear from us within 7 days, please contact the clinic through [employer] or phone. If you have a critical or abnormal lab result, we will notify you by phone as soon as possible.  Submit refill requests through [employer] or call your pharmacy and they will forward the refill request to us. Please allow 3 business days for your refill to be completed.          Additional Information About Your Visit        [employer] Information     [employer] gives you secure access to your electronic health record. If you see a primary care provider, you can also send messages to your care team and make appointments. If you have questions, please call your primary care clinic.  If you do not have a primary care provider, please call [phone] and they will assist you.      [employer] is an electronic gateway that provides easy, online access  "to your medical records. With [employer], you can request a clinic appointment, read your test results, renew a prescription or communicate with your care team.     To access your existing account, please contact your [facility] or call [phone] for assistance.        Care EveryWhere ID     This is your Care EveryWhere ID. This could be used by other organizations to access your [place] medical records  [id number]        Your Vitals Were     Pulse Temperature Height Pulse Oximetry Breastfeeding? BMI (Body Mass Index)    68 97.5  F (36.4  C) (Temporal) 5' 1\" (1.549 m) 98% No 26.43 kg/m2       Blood Pressure from Last 3 Encounters:   10/13/17 110/80   06/05/17 145/80   09/01/16 105/60    Weight from Last 3 Encounters:   10/13/17 139 lb 14.4 oz (63.5 kg)   06/05/17 141 lb 11.2 oz (64.3 kg)   09/01/16 139 lb 4.8 oz (63.2 kg)              We Performed the Following     ADMIN INFLUENZA (For MEDICARE Patients ONLY) []     Albumin Random Urine Quantitative with Creat Ratio     CBC with platelets     Comprehensive metabolic panel     FLU VACCINE, INCREASED ANTIGEN, PRESV FREE, AGE 65+ [70287]     Hemoglobin A1c     JUST IN CASE     Lipid panel reflex to direct LDL     TSH with free T4 reflex          Today's Medication Changes          These changes are accurate as of: 10/13/17 10:35 AM.  If you have any questions, ask your nurse or doctor.               These medicines have changed or have updated prescriptions.        Dose/Directions    alendronate 70 MG tablet   Commonly known as:  FOSAMAX   This may have changed:  See the new instructions.   Used for:  Age-related osteoporosis without current pathological fracture   Changed by:  Cherie Covington APRN CNP        TAKE 1 TAB BY MOUTH EVERY 7 DAYS 1 HR BEFORE BREAKFAST, WITH 8 OZ OF WATER. STAY UPRIGHT FOR 30 MIN   Quantity:  4 tablet   Refills:  3       ibuprofen 600 MG tablet   Commonly known as:  ADVIL/MOTRIN   This may have " changed:  See the new instructions.   Used for:  Lumbar radiculopathy   Changed by:  Cherie Covington APRN CNP        TAKE ONE TABLET BY MOUTH EVERY 12 HOURS AS NEEDED FOR MODERATE PAIN   Quantity:  60 tablet   Refills:  1       pravastatin 20 MG tablet   Commonly known as:  PRAVACHOL   This may have changed:  See the new instructions.   Used for:  Hyperlipidemia LDL goal <100   Changed by:  Cherie Covington APRN CNP        TAKE 1 TABLET (20 MG) BY MOUTH DAILY   Quantity:  90 tablet   Refills:  3            Where to get your medicines      These medications were sent to [facility]/pharmacy #[id number] - [facility] [address] [place] [address]  [address] [address]     Phone:  [phone]     alendronate 70 MG tablet    blood glucose monitoring test strip    ibuprofen 600 MG tablet    MICROLET LANCETS Misc    pravastatin 20 MG tablet                Primary Care Provider Office Phone # Fax #    CAMILO Benavides [phone]       [address]  [place] [address]        Equal Access to Services     [facility]: Hadii aad ku hadasho Soomaali, waaxda luqadaha, qaybta kaalmada adeegyada, waxay idiin hayaan nila jordan . So [facility] [phone].    ATENCIÓN: Si habla español, tiene a espinoza disposición servicios gratuitos de asistencia lingüística. Ll[facility] [phone].    We comply with applicable federal civil rights laws and [place] laws. We do not discriminate on the basis of race, color, national origin, age, disability, sex, sexual orientation, or gender identity.            Thank you!     Thank you for choosing [facility]  for your care. Our goal is always to provide you with excellent care. Hearing back from our patients is one way we can continue to improve our services. Please take a few minutes to complete the written survey that you may receive in the mail after your visit with us. Thank you!             Your Updated  Medication List - Protect others around you: Learn how to safely use, store and throw away your medicines at www.disposemymeds.org.          This list is accurate as of: 10/13/17 10:35 AM.  Always use your most recent med list.                   Brand Name Dispense Instructions for use Diagnosis    alendronate 70 MG tablet    FOSAMAX    4 tablet    TAKE 1 TAB BY MOUTH EVERY 7 DAYS 1 HR BEFORE BREAKFAST, WITH 8 OZ OF WATER. STAY UPRIGHT FOR 30 MIN    Age-related osteoporosis without current pathological fracture       ascorbic acid 500 MG tablet    VITAMIN C     Take by mouth daily    Lumbar radiculopathy, LBP (low back pain), Chronic pain disorder, Neuropathic pain, leg, unspecified laterality, Closed fracture of lumbar vertebra, sequela, Lumbar radicular pain, Right leg pain       blood glucose monitoring test strip    Cytomedix CONTOUR NEXT    100 strip    Use to test blood sugar 2 times daily or as directed.    Type 2 diabetes mellitus without complication, without long-term current use of insulin (H)       CHOICEFUL MULTIVITAMIN PO       Lumbar radiculopathy, LBP (low back pain), Chronic pain disorder, Neuropathic pain, leg, unspecified laterality, Closed fracture of lumbar vertebra, sequela, Lumbar radicular pain, Right leg pain       diclofenac 1 % Gel topical gel    VOLTAREN    100 g    APPLY 4 GRAMS TWICE DAILY USING ENCLOSED DOSING CARD.    DDD (degenerative disc disease), cervical, DDD (degenerative disc disease), lumbar       FISH OIL CONCENTRATE PO       Lumbar radiculopathy, LBP (low back pain), Chronic pain disorder, Neuropathic pain, leg, unspecified laterality, Closed fracture of lumbar vertebra, sequela, Lumbar radicular pain, Right leg pain       ibuprofen 600 MG tablet    ADVIL/MOTRIN    60 tablet    TAKE ONE TABLET BY MOUTH EVERY 12 HOURS AS NEEDED FOR MODERATE PAIN    Lumbar radiculopathy       MICROLET LANCETS Misc     200 each    USE TO TEST 1 TO 2 TIMES DAILY, AND AS NEEDED    Type 2 diabetes  mellitus without complication, without long-term current use of insulin (H)       * order for DME     1 Units    Equipment being ordered: TENS    Neuropathic pain, leg, unspecified laterality, LBP (low back pain), Lumbar radiculopathy       * order for DME     1 each    Equipment being ordered: walker  Wheeled and seated if needed    LBP (low back pain), DDD (degenerative disc disease), lumbar, Neuropathic pain, leg, unspecified laterality       pravastatin 20 MG tablet    PRAVACHOL    90 tablet    TAKE 1 TABLET (20 MG) BY MOUTH DAILY    Hyperlipidemia LDL goal <100       VITAMIN-B COMPLEX PO       Lumbar radiculopathy, LBP (low back pain), Chronic pain disorder, Neuropathic pain, leg, unspecified laterality, Closed fracture of lumbar vertebra, sequela, Lumbar radicular pain, Right leg pain       * Notice:  This list has 2 medication(s) that are the same as other medications prescribed for you. Read the directions carefully, and ask your doctor or other care provider to review them with you.

## 2017-10-13 NOTE — PROGRESS NOTES
SUBJECTIVE:   Rissa Acosta is a 67 year old female who presents to clinic today for the following health issues:    Diabetes Follow-up    Patient is checking blood sugars: 2 times daily.    Blood sugar testing frequency justification: Patient modifying lifestyle changes (diet, exercise) with blood sugars  Results are as follows:       am - 105    Diabetic concerns: None     Symptoms of hypoglycemia (low blood sugar): none     Paresthesias (numbness or burning in feet) or sores: No   Date of last diabetic eye exam: a few months    Hyperlipidemia Follow-Up      Rate your low fat/cholesterol diet?: good    Taking statin?  Yes, no muscle aches from statin    Other lipid medications/supplements?:  Fish oil/Omega 3, without side effects        Amount of exercise or physical activity: walking everyday     Problems taking medications regularly: No    Medication side effects: none  Diet: regular (no restrictions)      Problem list and histories reviewed & adjusted, as indicated.  Additional history: as documented    Patient Active Problem List   Diagnosis     CARDIOVASCULAR SCREENING; LDL GOAL LESS THAN 160     DDD (degenerative disc disease), cervical     DDD (degenerative disc disease), lumbar     Vitamin D insufficiency     Elevated LFTs     Positive PPD     Bilateral low back pain without sciatica     Lumbar radiculopathy     Advanced directives, counseling/discussion     Closed fracture of lumbar vertebra (H)     Spinal stenosis of lumbar region     Viral hepatitis     Type 2 diabetes mellitus without complication, without long-term current use of insulin (H)     Osteopenia     Neuropathic pain, leg     Exposure to hepatitis B     Spinal stenosis of lumbar region without neurogenic claudication     Osteoporosis     Hyperlipidemia LDL goal <100     Past Surgical History:   Procedure Laterality Date     BACK SURGERY  2011    [facility]     CATARACT IOL, RT/LT Right 2005       Social History   Substance Use  Topics     Smoking status: Never Smoker     Smokeless tobacco: Never Used     Alcohol use No     Family History   Problem Relation Age of Onset     DIABETES No family hx of      Macular Degeneration No family hx of      Glaucoma No family hx of      Strabismus No family hx of          Current Outpatient Prescriptions   Medication Sig Dispense Refill     ibuprofen (ADVIL/MOTRIN) 600 MG tablet TAKE ONE TABLET BY MOUTH EVERY 12 HOURS AS NEEDED FOR MODERATE PAIN 60 tablet 0     pravastatin (PRAVACHOL) 20 MG tablet TAKE 1 TABLET (20 MG) BY MOUTH DAILY 30 tablet 0     alendronate (FOSAMAX) 70 MG tablet TAKE 1 TAB BY MOUTH EVERY 7 DAYS 1 HR BEFORE BREAKFAST, WITH 8 OZ OF WATER. STAY UPRIGHT FOR 30 MIN 4 tablet 2     MICROLET LANCETS MISC USE TO TEST 1 TO 2 TIMES DAILY, AND AS NEEDED 200 each 3     blood glucose monitoring (BETSY CONTOUR NEXT) test strip Use to test blood sugar 2 times daily or as directed. 100 strip 3     diclofenac (VOLTAREN) 1 % GEL topical gel APPLY 4 GRAMS TWICE DAILY USING ENCLOSED DOSING CARD. 100 g 4     ORDER FOR DME Equipment being ordered: walker  Wheeled and seated if needed 1 each 0     ORDER FOR DME Equipment being ordered: TENS 1 Units 0     Multiple Vitamins-Minerals (CHOICEFUL MULTIVITAMIN PO)        ascorbic acid (VITAMIN C) 500 MG tablet Take by mouth daily       B Complex Vitamins (VITAMIN-B COMPLEX PO)        Omega-3 Fatty Acids (FISH OIL CONCENTRATE PO)        No Known Allergies  Recent Labs   Lab Test  06/05/17   0919  08/22/16   1044  07/07/16   1126  12/10/15   0848   09/27/13   0724   A1C  6.4*   --   6.5*  6.0   < >   --    LDL   --   82  131*  151*   < >  159*   HDL   --   56  52  71   < >  55   TRIG   --   97  143  100   < >  94   ALT   --   33  29  32   < >  54*   CR  0.70  0.69  0.62  0.60   < >  0.60   GFRESTIMATED  83  85  >90  Non  GFR Calc    >90  Non  GFR Calc     < >  >90   GFRESTBLACK  >90   GFR Calc    >90    "American GFR Calc    >90   GFR Calc    >90   GFR Calc     < >  >90   POTASSIUM  4.9  4.2  4.3  4.0   < >  4.7   TSH   --   0.92   --    --    --   1.18    < > = values in this interval not displayed.      BP Readings from Last 3 Encounters:   10/13/17 110/80   06/05/17 145/80   09/01/16 105/60    Wt Readings from Last 3 Encounters:   10/13/17 139 lb 14.4 oz (63.5 kg)   06/05/17 141 lb 11.2 oz (64.3 kg)   09/01/16 139 lb 4.8 oz (63.2 kg)           Labs reviewed in EPIC    Reviewed and updated as needed this visit by clinical staffTobacco  Allergies  Meds  Med Hx  Surg Hx  Fam Hx  Soc Hx      Reviewed and updated as needed this visit by Provider         ROS:  CONSTITUTIONAL:NEGATIVE for fever, chills, change in weight  ENT/MOUTH: NEGATIVE for ear, mouth and throat problems  RESP:NEGATIVE for significant cough or SOB  CV: NEGATIVE for chest pain, palpitations or peripheral edema  GI: NEGATIVE for nausea, abdominal pain, heartburn, or change in bowel habits  MUSCULOSKELETAL: POSITIVE  for back pain  and radicular pain been getting worse in the last 3 weeks and has been injected in the past. Will have pain into her right leg and NEGATIVE for joint swelling  and joint warmth   NEURO: POSITIVE for radicular pain  and NEGATIVE for involuntary movements and gait disturbance  ENDOCRINE: NEGATIVE for temperature intolerance, skin/hair changes  HEME/ALLERGY/IMMUNE: NEGATIVE for bleeding problems    OBJECTIVE:     /80 (BP Location: Right arm, Patient Position: Sitting, Cuff Size: Adult Regular)  Pulse 68  Temp 97.5  F (36.4  C) (Temporal)  Ht 5' 1\" (1.549 m)  Wt 139 lb 14.4 oz (63.5 kg)  SpO2 98%  Breastfeeding? No  BMI 26.43 kg/m2  Body mass index is 26.43 kg/(m^2).  GENERAL: Patient is well nourished, well developed,in no apparent distress, non-toxic, in no respiratory distress and acyanotic, alert and cooperative  EYES: Eyes grossly normal to inspection and conjunctivae and " sclerae normal  HENT:nose and mouth without ulcers or lesions   NECK:normal and supple  CARDIAC:regular rates and rhythm and no murmur, click or rub  carotids with brisk upstroke/without bruit bilaterally and without LE edema bilaterally  RESP: normal respiratory rate and rhythm, lungs clear to auscultation  unlabored respirations, no intercostal retractions or accessory muscle use  ABD:soft, nontender  SKIN: Skin color, texture, turgor normal. No rashes or lesions.  MS: extremities normal- no gross deformities noted, gait normal and normal muscle tone  NEURO: mentation intact and speech normal  PSYCH: Alert, oriented, thought content appropriate,mentation appears normal., affect and mood normal    Diagnostic Test Results:  Results for orders placed or performed in visit on 10/13/17   Lipid panel reflex to direct LDL   Result Value Ref Range    Cholesterol 206 (H) <200 mg/dL    Triglycerides 128 <150 mg/dL    HDL Cholesterol 59 >49 mg/dL    LDL Cholesterol Calculated 121 (H) <100 mg/dL    Non HDL Cholesterol 147 (H) <130 mg/dL   Albumin Random Urine Quantitative with Creat Ratio   Result Value Ref Range    Creatinine Urine 60 mg/dL    Albumin Urine mg/L <5 mg/L    Albumin Urine mg/g Cr Unable to calculate due to low value 0 - 25 mg/g Cr   Comprehensive metabolic panel   Result Value Ref Range    Sodium 139 133 - 144 mmol/L    Potassium 4.4 3.4 - 5.3 mmol/L    Chloride 105 94 - 109 mmol/L    Carbon Dioxide 29 20 - 32 mmol/L    Anion Gap 5 3 - 14 mmol/L    Glucose 106 (H) 70 - 99 mg/dL    Urea Nitrogen 12 7 - 30 mg/dL    Creatinine 0.69 0.52 - 1.04 mg/dL    GFR Estimate 84 >60 mL/min/1.7m2    GFR Estimate If Black >90 >60 mL/min/1.7m2    Calcium 8.9 8.5 - 10.1 mg/dL    Bilirubin Total 0.8 0.2 - 1.3 mg/dL    Albumin 4.1 3.4 - 5.0 g/dL    Protein Total 8.4 6.8 - 8.8 g/dL    Alkaline Phosphatase 68 40 - 150 U/L    ALT 51 (H) 0 - 50 U/L    AST 34 0 - 45 U/L   Hemoglobin A1c   Result Value Ref Range    Hemoglobin A1C 6.1  (H) 4.3 - 6.0 %   TSH with free T4 reflex   Result Value Ref Range    TSH 0.56 0.40 - 4.00 mU/L   CBC with platelets   Result Value Ref Range    WBC 7.4 4.0 - 11.0 10e9/L    RBC Count 4.96 3.8 - 5.2 10e12/L    Hemoglobin 13.9 11.7 - 15.7 g/dL    Hematocrit 40.5 35.0 - 47.0 %    MCV 82 78 - 100 fl    MCH 28.0 26.5 - 33.0 pg    MCHC 34.3 31.5 - 36.5 g/dL    RDW 14.6 10.0 - 15.0 %    Platelet Count 230 150 - 450 10e9/L       ASSESSMENT/PLAN:     Rissa was seen today for diabetes and lipids.    Diagnoses and all orders for this visit:    Type 2 diabetes mellitus without complication, without long-term current use of insulin (H)  -     MICROLET LANCETS MISC; USE TO TEST 1 TO 2 TIMES DAILY, AND AS NEEDED  -     blood glucose monitoring (Ebix CONTOUR NEXT) test strip; Use to test blood sugar 2 times daily or as directed.  -     Albumin Random Urine Quantitative with Creat Ratio  -     Comprehensive metabolic panel  -     Hemoglobin A1c  -     JUST IN CASE  -     CBC with platelets  foot care discussed and Podiatry visits discussed, annual eye examinations at Ophthalmology discussed, glycohemoglobin monitoring discussed and long term diabetic complications discussed  No changes in current regimen  Attempt to improve diet  Increased exercise advised      Lumbar radiculopathy  -     ibuprofen (ADVIL/MOTRIN) 600 MG tablet; TAKE ONE TABLET BY MOUTH EVERY 12 HOURS AS NEEDED FOR MODERATE PAIN  -     MR Lumbar Spine w/o Contrast; Future    Bilateral low back pain with right-sided sciatica, unspecified chronicity  -     MR Lumbar Spine w/o Contrast; Future    Hyperlipidemia LDL goal <100  -     pravastatin (PRAVACHOL) 20 MG tablet; TAKE 1 TABLET (20 MG) BY MOUTH DAILY  -     Lipid panel reflex to direct LDL  -     Comprehensive metabolic panel  -     TSH with free T4 reflex  Continue current medications.    Age-related osteoporosis without current pathological fracture  -     alendronate (FOSAMAX) 70 MG tablet; TAKE 1 TAB BY MOUTH  EVERY 7 DAYS 1 HR BEFORE BREAKFAST, WITH 8 OZ OF WATER. STAY UPRIGHT FOR 30 MIN    Need for prophylactic vaccination and inoculation against influenza  -     FLU VACCINE, INCREASED ANTIGEN, PRESV FREE, AGE 65+ [36715]  -     ADMIN INFLUENZA (For MEDICARE Patients ONLY) []    PLAN:    Patient needs to follow up in if no improvement,or sooner if worsening of symptoms or other symptoms develop.  FURTHER TESTING:       - MRI lumbar spine  I will place order. Please call [phone] to schedule.  Will follow up and/or notify patient on results via phone or mail to determine further need for followup  Follow up in 6 months.      See Patient Instructions    CAMILO Benavides CNP  [facility]  Injectable Influenza Immunization Documentation    1.  Is the person to be vaccinated sick today?   No    2. Does the person to be vaccinated have an allergy to a component   of the vaccine?   No    3. Has the person to be vaccinated ever had a serious reaction   to influenza vaccine in the past?   No    4. Has the person to be vaccinated ever had Guillain-Barré syndrome?   No    Form completed by Cherie Covington NP, APRN ÁNGELA

## 2017-10-16 ENCOUNTER — TELEPHONE (OUTPATIENT)
Dept: PEDIATRICS | Facility: CLINIC | Age: 67
End: 2017-10-16

## 2017-10-16 DIAGNOSIS — E78.5 HYPERLIPIDEMIA LDL GOAL <100: Primary | ICD-10-CM

## 2017-10-16 NOTE — TELEPHONE ENCOUNTER
Patient's mother has been taking the Chol meds everyday. She has not missed one day. Please advise.

## 2017-10-16 NOTE — TELEPHONE ENCOUNTER
Patient's daughter called back.  While on the phone with her, patient also calling.   Gave message per Liane Covington NP.   Mojgan states she will relay the message per Liane Covington NP.  If patient is taking the medication regularly, will call the clinic back to inform.    Ranjana Hernandez RN, [facility]

## 2017-10-16 NOTE — TELEPHONE ENCOUNTER
Left message on Mojgan's voicemail with Liane Grayson note below.  She can call to schedule lab only appt and if further questions ask to speak with nurse.    Monika Reddy RN,   [facility], [facility]

## 2017-10-16 NOTE — TELEPHONE ENCOUNTER
Called patient with Ukrainian , he left a message on patient's phone to call back.    Called patient's daughter, Mojgan.  Left message with phone number to call back.      Notes Recorded by Cherie Covington APRN CNP on 10/15/2017 at 7:04 PM  Please call   Labs are normal except lipids are elevated. Has she been taking her cholesterol medicine regularly as elevated in comparison to last year.   A1c is good. Follow up in 6 months

## 2017-10-16 NOTE — TELEPHONE ENCOUNTER
Ok will recheck fasting in a couple week   Last year lipids were great so will double check before we increase the dosage

## 2017-10-17 DIAGNOSIS — M54.50 BILATERAL LOW BACK PAIN WITHOUT SCIATICA, UNSPECIFIED CHRONICITY: ICD-10-CM

## 2017-10-17 DIAGNOSIS — M54.16 LUMBAR RADICULOPATHY: ICD-10-CM

## 2017-10-17 RX ORDER — IBUPROFEN 600 MG/1
TABLET, FILM COATED ORAL
Qty: 60 TABLET | Refills: 0 | OUTPATIENT
Start: 2017-10-17

## 2017-10-17 NOTE — TELEPHONE ENCOUNTER
ibuprofen (ADVIL/MOTRIN) 600 MG tablet   Last Written Prescription Date: 10/13/2017  Last Quantity: 60, # refills: 1  Last Office Visit with [facility], [facility] or [facility] prescribing provider: 10/13/2017       Creatinine   Date Value Ref Range Status   10/13/2017 0.69 0.52 - 1.04 mg/dL Final     Lab Results   Component Value Date    AST 34 10/13/2017     Lab Results   Component Value Date    ALT 51 10/13/2017     BP Readings from Last 3 Encounters:   10/13/17 110/80   06/05/17 145/80   09/01/16 105/60     Refilled per [facility] protocol.    Cally Cronin RN

## 2017-10-19 ENCOUNTER — RADIANT APPOINTMENT (OUTPATIENT)
Dept: MRI IMAGING | Facility: CLINIC | Age: 67
End: 2017-10-19
Attending: NURSE PRACTITIONER
Payer: COMMERCIAL

## 2017-10-19 DIAGNOSIS — M54.16 LUMBAR RADICULOPATHY: ICD-10-CM

## 2017-10-19 DIAGNOSIS — M54.41 BILATERAL LOW BACK PAIN WITH RIGHT-SIDED SCIATICA, UNSPECIFIED CHRONICITY: ICD-10-CM

## 2017-10-19 PROCEDURE — 72148 MRI LUMBAR SPINE W/O DYE: CPT | Performed by: RADIOLOGY

## 2017-10-20 ENCOUNTER — TELEPHONE (OUTPATIENT)
Dept: PEDIATRICS | Facility: CLINIC | Age: 67
End: 2017-10-20

## 2017-10-20 NOTE — TELEPHONE ENCOUNTER
MR Lumbar Spine w/o Contrast   Status:  Final result   Visible to patient:  Yes (Sukhwinder) Dx:  Lumbar radiculopathy; Bilateral low b... Order: [id number]       Notes Recorded by Cherie Covington APRN CNP on 10/19/2017 at 9:46 PM  Please let patient know MRI looks stable   If persistent symptoms would have her make follow up with sports med here as may possible benefit from injection again       Details        Reading Physician Reading Date Result Priority       Hunter Killian MD Folkertsma, James Medina MD 10/19/2017  10/19/2017            Narrative             MR LUMBAR SPINE W/O CONTRAST 10/19/2017 10:36 AM    Provided History: Radiculopathy, lumbar region, Lumbago with sciatica,  right side    Comparison: MRI lumbar spine 1/4/2016, 7/29/2013.    Technique: Sagittal T1-weighted, sagittal STIR, 3D volumetric axial  and sagittal reconstructed T2-weighted images of the lumbar spine were  obtained without intravenous contrast.     Findings: Regarding numbering convention, there are 5 lumbar-type  vertebrae assumed for the purposes of this dictation.  The tip of the  conus medullaris is at L1-2.  Regarding alignment, there is stable  grade 2 anterolisthesis of L4 on L5. Elsewhere, alignment is  maintained. Unchanged chronic superior endplate compression deformity  of L1. Unchanged mild compression deformity of the superior endplate  of L5. There is water to severe disc height narrowing at L4-L5 and  L5-S1. Mild to moderate disc height loss at L2-3. There are Modic 2  endplate changes of the opposing endplates of L4-5 and L5-S1. Multiple  Schmorl's nodes in the lumbar spine.    On a level by level basis:    T12-L1: There is an unchanged central disc protrusion which indents  the ventral spinal cord. There is no abnormal spinal cord signal. The  neural foramen are patent. Mild spinal canal stenosis.    L1-2: Unchanged disc bulge flattens the ventral thecal sac. Mild  spinal canal narrowing. No neural foraminal  stenosis.    L2-3: Unchanged circumferential disc bulge with a superimposed left  neural foraminal disc protrusion. Narrowing of the lateral recesses  bilaterally. Bulge approaches but does not appear to impinge upon the  traversing L3 spinal nerves. Mild neural foraminal narrowing on the  left. Right neural foramen is patent. Mild spinal canal narrowing.    L3-4: Unchanged disc bulge narrows flattens the ventral thecal sac and  narrows the lateral recesses without evidence of impingement of the  traversing L4 spinal nerves. Facet arthropathy and ligamentum flavum  thickening. Mild neural foraminal narrowing bilaterally. Mild spinal  canal narrowing.    L4-5: Unchanged grade 2 anterolisthesis and compression deformity of  the superior endplate of L5. Uncovering of the disc and  circumferential disc bulge. There is severe neural foraminal stenosis  bilaterally with impingement of the exiting L4 nerve roots. Bilateral  facet hypertrophy. Severe narrowing of the spinal canal, with  impingement of the traversing spinal nerves, unchanged.    L5-S1: Decompressive laminectomy changes. Unchanged disc bulge.  Bilateral facet hypertrophy. Moderate bilateral neural foraminal  narrowing. Spinal canal is patent.    Paraspinous tissues are within normal limits.             Impression             Impression:   1. Unchanged moderate to severe multilevel lumbar spondylosis, most  pronounced at L4-5 with grade 2 anterolisthesis and severe bilateral  neural foraminal stenosis with impingement of the exiting L4 nerve  roots and severe spinal canal stenosis.  2. Unchanged compression deformities of L1 and L5.  3. Overall unchanged MRI of the lumbar spine.    I have personally reviewed the examination and initial interpretation  and I agree with the findings.    JENNIFER HOFFMAN MD               Last Resulted: 10/19/17  2:51 PM                           Cally Cronin RN

## 2017-10-20 NOTE — LETTER
October 20, 2017      Rissa Acosta  [address]  [address]  [facility] [place] [facility] [address]        Dear Rissa,     Enclosed are your MRI results.    MRI looks stable   If persistent symptoms would have you make a follow up appointment with sports medicine here as may possible benefit from injection again      Sincerely,        CAMILO Benavides CNP

## 2017-11-07 DIAGNOSIS — E78.5 HYPERLIPIDEMIA LDL GOAL <100: ICD-10-CM

## 2017-11-07 LAB
CHOLEST SERPL-MCNC: 174 MG/DL
HDLC SERPL-MCNC: 53 MG/DL
LDLC SERPL CALC-MCNC: 93 MG/DL
NONHDLC SERPL-MCNC: 121 MG/DL
TRIGL SERPL-MCNC: 138 MG/DL

## 2017-11-07 PROCEDURE — 36415 COLL VENOUS BLD VENIPUNCTURE: CPT | Performed by: NURSE PRACTITIONER

## 2017-11-07 PROCEDURE — 80061 LIPID PANEL: CPT | Performed by: NURSE PRACTITIONER

## 2017-11-25 DIAGNOSIS — M50.30 DDD (DEGENERATIVE DISC DISEASE), CERVICAL: ICD-10-CM

## 2017-11-25 DIAGNOSIS — M51.369 DDD (DEGENERATIVE DISC DISEASE), LUMBAR: ICD-10-CM

## 2017-11-28 NOTE — TELEPHONE ENCOUNTER
diclofenac (VOLTAREN) 1 % GEL topical gel   Last Written Prescription Date: 12/8/2016  Last Quantity: 100 g, # refills: 4  Last Office Visit with [facility], [facility] or [facility] prescribing provider: 10/13/2017       Creatinine   Date Value Ref Range Status   10/13/2017 0.69 0.52 - 1.04 mg/dL Final     Lab Results   Component Value Date    AST 34 10/13/2017     Lab Results   Component Value Date    ALT 51 10/13/2017     BP Readings from Last 3 Encounters:   10/13/17 110/80   06/05/17 145/80   09/01/16 105/60     Refilled per [facility] protocol.    Cally Cronin RN

## 2017-12-07 DIAGNOSIS — M54.16 LUMBAR RADICULOPATHY: ICD-10-CM

## 2017-12-08 RX ORDER — IBUPROFEN 600 MG/1
TABLET, FILM COATED ORAL
Qty: 60 TABLET | Refills: 4 | Status: SHIPPED | OUTPATIENT
Start: 2017-12-08 | End: 2018-04-22

## 2017-12-08 NOTE — TELEPHONE ENCOUNTER
ibuprofen (ADVIL/MOTRIN) 600 MG tablet     Sig: TAKE ONE TABLET BY MOUTH EVERY 12 HOURS AS NEEDED FOR MODERATE PAIN  Last Written Prescription Date: 10/13/2017  Last Quantity: 60, # refills: 1  Last Office Visit with [facility] or [facility] prescribing provider: 10/13/2017       Creatinine   Date Value Ref Range Status   10/13/2017 0.69 0.52 - 1.04 mg/dL Final     Lab Results   Component Value Date    AST 34 10/13/2017     Lab Results   Component Value Date    ALT 51 10/13/2017     BP Readings from Last 3 Encounters:   10/13/17 110/80   06/05/17 145/80   09/01/16 105/60     Refilled per [facility] protocol.    Cally Cronin RN

## 2018-01-25 ENCOUNTER — TELEPHONE (OUTPATIENT)
Dept: PEDIATRICS | Facility: CLINIC | Age: 68
End: 2018-01-25

## 2018-01-25 DIAGNOSIS — E11.9 TYPE 2 DIABETES MELLITUS WITHOUT COMPLICATION, WITHOUT LONG-TERM CURRENT USE OF INSULIN (H): Primary | ICD-10-CM

## 2018-01-25 NOTE — TELEPHONE ENCOUNTER
Received fax from pharmacy: requesting a new rx sent in for one touch ultra meter/strips/lancets? Insurance no longer covers contour. Please advise.    CHRIS Holt

## 2018-02-18 DIAGNOSIS — M81.0 AGE-RELATED OSTEOPOROSIS WITHOUT CURRENT PATHOLOGICAL FRACTURE: ICD-10-CM

## 2018-02-19 RX ORDER — ALENDRONATE SODIUM 70 MG/1
TABLET ORAL
Qty: 4 TABLET | Refills: 2 | Status: SHIPPED | OUTPATIENT
Start: 2018-02-19 | End: 2018-05-03

## 2018-02-19 NOTE — TELEPHONE ENCOUNTER
alendronate (FOSAMAX) 70 MG tablet 4 tablet 3 10/13/2017  No   Sig: TAKE 1 TAB BY MOUTH EVERY 7 DAYS 1 HR BEFORE BREAKFAST, WITH 8 OZ OF WATER. STAY UPRIGHT FOR 30 MIN     Last OV with Cherie Covington CNP; 10/13/2017    Last order of DX HIP/PELVIS/SPINE was found on 8/16/2017 from Refill on 8/15/2017     Creatinine   Date Value Ref Range Status   10/13/2017 0.69 0.52 - 1.04 mg/dL Final       Bisphosphonates Failed2/18 5:56 AM   Dexa on file within past 2 years    Recent or future visit with authorizing provider's specialty    Patient is age 18 or older    Normal Serum Creatinine on file within past 12 months     Refilled per [facility] protocol.    Cally Cronin RN

## 2018-03-07 DIAGNOSIS — E78.5 HYPERLIPIDEMIA LDL GOAL <100: ICD-10-CM

## 2018-03-07 RX ORDER — PRAVASTATIN SODIUM 20 MG
TABLET ORAL
Qty: 30 TABLET | Refills: 0 | OUTPATIENT
Start: 2018-03-07

## 2018-03-07 NOTE — TELEPHONE ENCOUNTER
pravastatin (PRAVACHOL) 20 MG tablet 90 tablet 3 10/13/2017  No   Sig: TAKE 1 TABLET (20 MG) BY MOUTH DAILY   Class: E-Prescribe     Last OV with Cherie Covington, CNP:10/13/2017    Future OV: none    LDL Cholesterol Calculated   Date Value Ref Range Status   11/07/2017 93 <100 mg/dL Final     Comment:     Desirable:       <100 mg/dl   ]  Creatinine   Date Value Ref Range Status   10/13/2017 0.69 0.52 - 1.04 mg/dL Final     Statins Protocol Passed3/7 5:52 AM   LDL on file in past 12 months    No abnormal creatine kinase in past 12 months    Recent (12 mo) or future (30 days) visit within the authorizing provider's specialty    Patient is age 18 or older    No active pregnancy on record    No positive pregnancy test in past 12 months     Our records indicate duplicate request. Same requesting pharmacy.     Cally Cronin RN

## 2018-04-06 DIAGNOSIS — M51.369 DDD (DEGENERATIVE DISC DISEASE), LUMBAR: ICD-10-CM

## 2018-04-06 DIAGNOSIS — M50.30 DDD (DEGENERATIVE DISC DISEASE), CERVICAL: ICD-10-CM

## 2018-04-09 NOTE — TELEPHONE ENCOUNTER
Routing refill request to provider for review/approval because:  Drug not on the [facility] refill protocol     diclofenac (VOLTAREN) 1 % GEL topical gel 100 g 4 11/28/2017  No   Sig: APPLY 4 GRAMS TWICE DAILY USING ENCLOSED DOSING CARD.     Last OV with Cherie Covington CNP: 10/13/2017    No future apts.     Cally Cronin RN

## 2018-04-22 DIAGNOSIS — M54.16 LUMBAR RADICULOPATHY: ICD-10-CM

## 2018-04-23 RX ORDER — IBUPROFEN 600 MG/1
TABLET, FILM COATED ORAL
Qty: 60 TABLET | Refills: 1 | Status: SHIPPED | OUTPATIENT
Start: 2018-04-23 | End: 2018-06-20

## 2018-04-23 NOTE — TELEPHONE ENCOUNTER
ibuprofen (ADVIL/MOTRIN) 600 MG tablet 60 tablet 4 12/8/2017  No   Sig: TAKE ONE TABLET BY MOUTH EVERY 12 HOURS AS NEEDED FOR MODERATE PAIN     Last Ov with Cherie Covington CNP:    No future apt's    Lab Results   Component Value Date    ALT 51 10/13/2017     BP Readings from Last 3 Encounters:   10/13/17 110/80   06/05/17 145/80   09/01/16 105/60     Lab Results   Component Value Date    WBC 7.4 10/13/2017     Lab Results   Component Value Date    RBC 4.96 10/13/2017     Lab Results   Component Value Date    HGB 13.9 10/13/2017     Lab Results   Component Value Date    HCT 40.5 10/13/2017     No components found for: MCT  Lab Results   Component Value Date    MCV 82 10/13/2017     Lab Results   Component Value Date    MCH 28.0 10/13/2017     Lab Results   Component Value Date    MCHC 34.3 10/13/2017     Lab Results   Component Value Date    RDW 14.6 10/13/2017     Lab Results   Component Value Date     10/13/2017     Creatinine   Date Value Ref Range Status   10/13/2017 0.69 0.52 - 1.04 mg/dL Final     NSAID Medications Failed4/22 6:55 PM   Normal ALT on file in past 12 months    Patient is age 6-64 years    Blood pressure under 140/90 in past 12 months    Normal AST on file in past 12 months    Recent (12 mo) or future (30 days) visit within the authorizing provider's specialty    Normal CBC on file in past 12 months    No active pregnancy on record    Normal serum creatinine on file in past 12 months    No positive pregnancy test in past 12 months     Refilled per [facility] protocol.    Cally Cronin RN

## 2018-05-03 DIAGNOSIS — M81.0 AGE-RELATED OSTEOPOROSIS WITHOUT CURRENT PATHOLOGICAL FRACTURE: ICD-10-CM

## 2018-05-04 DIAGNOSIS — E11.9 TYPE 2 DIABETES MELLITUS WITHOUT COMPLICATION, WITHOUT LONG-TERM CURRENT USE OF INSULIN (H): ICD-10-CM

## 2018-05-04 RX ORDER — ALENDRONATE SODIUM 70 MG/1
TABLET ORAL
Qty: 4 TABLET | Refills: 2 | Status: SHIPPED | OUTPATIENT
Start: 2018-05-04 | End: 2018-07-19

## 2018-05-04 NOTE — TELEPHONE ENCOUNTER
alendronate (FOSAMAX) 70 MG tablet 4 tablet 2 2/19/2018  No   Sig: TAKE 1 TAB BY MOUTH EVERY 7 DAYS 1 HR BEFORE BREAKFAST, WITH 8 OZ OF WATER. STAY UPRIGHT FOR 30 MIN     Last OV with Cherie Covington CNP: 10/13/2017    No future apts.     Last order of DX HIP/PELVIS/SPINE was found on 8/16/2017 from Refill on 8/15/2017     Creatinine   Date Value Ref Range Status   10/13/2017 0.69 0.52 - 1.04 mg/dL Final     Bisphosphonates Failed5/3 10:19 AM   Dexa on file within past 2 years    Recent (12 mo) or future (30 days) visit within the authorizing provider's specialty    Patient is age 18 or older    Normal serum creatinine on file within past 12 months     Refilled per [facility] protocol.    Cally Cronin RN

## 2018-05-04 NOTE — LETTER
May 7, 2018      Rissa Acosta  [address] [place] [address]  [address]  [facility] [address]              Dear Rissa,      We recently received a call from your pharmacy requesting a refill of your blood glucose monitoring kit. We have provided a new kit for you.     A review of your chart indicates that your last office visit was on 10/13/2017.  An appointment is required twice yearly with your provider.    We have authorized a one time refill of your medication to allow time for you to schedule.     If you have a history of diabetes or high cholesterol, please come in fasting for the appointment. Fasting entails nothing to eat or drink 8 hours prior to your appointment; with the exception on water. You may take your medication the day of the appointment.    Please call the clinic to schedule your appointment.    Thank you for taking an active role in your healthcare.      Sincerely,    [facility]

## 2018-05-07 RX ORDER — BLOOD-GLUCOSE METER
EACH MISCELLANEOUS
Qty: 1 KIT | Refills: 0 | Status: SHIPPED | OUTPATIENT
Start: 2018-05-07 | End: 2018-09-13

## 2018-05-07 NOTE — TELEPHONE ENCOUNTER
blood glucose monitoring (ONETOUCH VERIO) meter device kit  USE TO TEST BLOOD SUGAR 2 TIMES DAILY OR AS DIRECTED.    Diabetic Supplies Protocol Failed5/4 7:16 AM   Recent (6 mo) or future (30 days) visit within the authorizing provider's specialty    Patient is 18 years of age or older     Associated Diagnoses   Type 2 diabetes mellitus without complication, without long-term current use of insulin (H) [E11.9]  - Primary         Last OV with Cherie Covington CNP: 10/13/2017    No future apts.     Kit sent. Letter sent to patient to schedule 6 month diabetes follow up. Cally Cronin RN

## 2018-05-22 ENCOUNTER — DOCUMENTATION ONLY (OUTPATIENT)
Dept: LAB | Facility: CLINIC | Age: 68
End: 2018-05-22

## 2018-05-22 DIAGNOSIS — E78.5 HYPERLIPIDEMIA LDL GOAL <100: Primary | ICD-10-CM

## 2018-05-22 DIAGNOSIS — E11.9 TYPE 2 DIABETES MELLITUS WITHOUT COMPLICATION, WITHOUT LONG-TERM CURRENT USE OF INSULIN (H): ICD-10-CM

## 2018-05-23 NOTE — PROGRESS NOTES
Next 5 appointments (look out 90 days)     May 25, 2018  8:15 AM CDT   Return Visit with CAMILO Benavides CNP, LANGUAGE BANC   [facility] ([facility])    [id number] [place] [id number]   [phone]                Done

## 2018-05-23 NOTE — PROGRESS NOTES
Pending lab orders for 5/25/2018. Please review, sign, and close encounter. Thank you. Cally Cronin RN

## 2018-05-25 ENCOUNTER — OFFICE VISIT (OUTPATIENT)
Dept: PEDIATRICS | Facility: CLINIC | Age: 68
End: 2018-05-25
Payer: COMMERCIAL

## 2018-05-25 VITALS
WEIGHT: 134.6 LBS | SYSTOLIC BLOOD PRESSURE: 108 MMHG | OXYGEN SATURATION: 97 % | HEIGHT: 61 IN | HEART RATE: 68 BPM | BODY MASS INDEX: 25.41 KG/M2 | TEMPERATURE: 97.4 F | DIASTOLIC BLOOD PRESSURE: 70 MMHG

## 2018-05-25 DIAGNOSIS — E11.9 TYPE 2 DIABETES MELLITUS WITHOUT COMPLICATION, WITHOUT LONG-TERM CURRENT USE OF INSULIN (H): ICD-10-CM

## 2018-05-25 DIAGNOSIS — Z12.31 ENCOUNTER FOR SCREENING MAMMOGRAM FOR BREAST CANCER: ICD-10-CM

## 2018-05-25 DIAGNOSIS — Z78.0 POSTMENOPAUSAL STATUS: ICD-10-CM

## 2018-05-25 DIAGNOSIS — M25.512 ACUTE PAIN OF LEFT SHOULDER: ICD-10-CM

## 2018-05-25 DIAGNOSIS — E11.9 TYPE 2 DIABETES MELLITUS WITHOUT COMPLICATION, WITHOUT LONG-TERM CURRENT USE OF INSULIN (H): Primary | ICD-10-CM

## 2018-05-25 LAB
ANION GAP SERPL CALCULATED.3IONS-SCNC: 6 MMOL/L (ref 3–14)
BUN SERPL-MCNC: 21 MG/DL (ref 7–30)
CALCIUM SERPL-MCNC: 9 MG/DL (ref 8.5–10.1)
CHLORIDE SERPL-SCNC: 106 MMOL/L (ref 94–109)
CO2 SERPL-SCNC: 27 MMOL/L (ref 20–32)
CREAT SERPL-MCNC: 0.75 MG/DL (ref 0.52–1.04)
GFR SERPL CREATININE-BSD FRML MDRD: 76 ML/MIN/1.7M2
GLUCOSE SERPL-MCNC: 124 MG/DL (ref 70–99)
HBA1C MFR BLD: 6.4 % (ref 0–5.6)
POTASSIUM SERPL-SCNC: 4.3 MMOL/L (ref 3.4–5.3)
SODIUM SERPL-SCNC: 139 MMOL/L (ref 133–144)

## 2018-05-25 PROCEDURE — 80048 BASIC METABOLIC PNL TOTAL CA: CPT | Performed by: NURSE PRACTITIONER

## 2018-05-25 PROCEDURE — 99213 OFFICE O/P EST LOW 20 MIN: CPT | Performed by: NURSE PRACTITIONER

## 2018-05-25 PROCEDURE — 36415 COLL VENOUS BLD VENIPUNCTURE: CPT | Performed by: NURSE PRACTITIONER

## 2018-05-25 PROCEDURE — 83036 HEMOGLOBIN GLYCOSYLATED A1C: CPT | Performed by: NURSE PRACTITIONER

## 2018-05-25 NOTE — LETTER
June 4, 2019      Rissa Acosta  [address]  [address]  [facility] [address]              Dear Rissa Acosta,      In order to ensure that we are providing the best quality care, we would like to remind you that you are due for a DEXA (bone density) scan. Please let us know if you have any questions and we would be happy to help.     Thank you for trusting us with your care.    Sincerely,       [facility] Team  [phone]

## 2018-05-25 NOTE — PATIENT INSTRUCTIONS
PLAN:   1.   Symptomatic therapy suggested: Continue current medications.    2.  Orders Placed This Encounter   Procedures     MA Screening Digital Bilateral     DX Hip/Pelvis/Spine       3. Patient needs to follow up in if no improvement,or sooner if worsening of symptoms or other symptoms develop.  FURTHER TESTING:       - DXA (bone density) scan       - mammogram  Follow up in 6 months with fasting labs   Schedule physical exam 6 months     It was a pleasure seeing you today at the [facility]. Thank you for allowing us to care for you today. We truly hope we provided you with the excellent service you deserve. Please let us know if there is anything else we can do for you so we can be sure you are leaving completley satisfied with your care experience.       General information about your clinic   Clinic Hours Lab Hours (Appointments are required)   Mon-Thurs: 7:30 AM - 7 PM Mon-Thurs: 7:30 AM - 7 PM   Fri: 7:30 AM - 5 PM Fri: 7:30 AM - 5 PM        After Hours Nurse Advise & Appts:  [facility]: [phone]  [facility] On Call: to make appointments anytime: [phone] On Call Physician: call [phone] and answering service will page the on call physician.        For urgent appointments, please call [phone] and ask for the triage nurse or your care team clinic nurse.  How to contact my care team:  [facility]: [email or web address]   Phone: [phone]   Fax: [phone]       [facility]:   Phone: [phone]  Hours: 8:00 AM - 6:00 PM  Medication Refills:  Call your pharmacy and they will forward the refill to us. Please allow 3 business days for your refills to be completed.       Normal or non-critical lab and imaging results will be communicated to you by MyChart, letter or phone within 7 days.  If you do not hear from us within 10 days, please call the clinic. If you have a critical or abnormal lab result, we will notify you by phone as soon as  possible.       We now have PWIC (Pediatric Walk in Care)  Monday-Friday from 7:30-4. Simply walk in and be seen for your urgent needs like cough, fever, rash, diarrhea or vomiting, pink eye, UTI. No appointments needed. Ask one of the team for more information      -Your Care Team:    Dr. Tracey Salcedo - Internal Medicine/Pediatrics   Dr. Tory Jerome - Family Medicine  Dr. Shania Esquivel - Pediatrics  Dr. Em Hickman - Pediatrics  Cherie Covington CNP - Family Practice Nurse Practitioner

## 2018-05-25 NOTE — PROGRESS NOTES
Kishor Acosta,    Attached are your test results.  -Kidney function is normal (Cr, GFR), Sodium is normal, Potassium is normal, Calcium is normal  -A1C (test of diabetes control the last 2-3 months) is at your goal. Please continue with current plan. Also, see me and recheck your A1C test in 6 months.    Please contact us if you have any questions.    Cherie Covington, CNP

## 2018-05-25 NOTE — MR AVS SNAPSHOT
After Visit Summary   5/25/2018    Rissa Acosta    MRN: [id number]           Patient Information     Date Of Birth          1950        Visit Information        Provider Department      5/25/2018 8:15 AM Cherie Covington APRN CNP; LANGUAGE [facility]        Today's Diagnoses     Type 2 diabetes mellitus without complication, without long-term current use of insulin (H)    -  1    Encounter for screening mammogram for breast cancer        Postmenopausal status          Care Instructions    PLAN:   1.   Symptomatic therapy suggested: Continue current medications.    2.  Orders Placed This Encounter   Procedures     MA Screening Digital Bilateral     DX Hip/Pelvis/Spine       3. Patient needs to follow up in if no improvement,or sooner if worsening of symptoms or other symptoms develop.  FURTHER TESTING:       - DXA (bone density) scan       - mammogram  Follow up in 6 months with fasting labs   Schedule physical exam 6 months     It was a pleasure seeing you today at the [facility]. Thank you for allowing us to care for you today. We truly hope we provided you with the excellent service you deserve. Please let us know if there is anything else we can do for you so we can be sure you are leaving completley satisfied with your care experience.       General information about your clinic   Clinic Hours Lab Hours (Appointments are required)   Mon-Thurs: 7:30 AM - 7 PM Mon-Thurs: 7:30 AM - 7 PM   Fri: 7:30 AM - 5 PM Fri: 7:30 AM - 5 PM        After Hours Nurse Advise & Appts:  [facility] Nurse Advisors: [phone]  [facility] On Call: to make appointments anytime: [phone] On Call Physician: call [phone] and answering service will page the on call physician.        For urgent appointments, please call [phone] and ask for the triage nurse or your care team clinic nurse.  How to contact my care team:  MyChart: [email or web address]    Phone: [phone]   Fax: [phone]       [facility]:   Phone: [phone]  Hours: 8:00 AM - 6:00 PM  Medication Refills:  Call your pharmacy and they will forward the refill to us. Please allow 3 business days for your refills to be completed.       Normal or non-critical lab and imaging results will be communicated to you by MyChart, letter or phone within 7 days.  If you do not hear from us within 10 days, please call the clinic. If you have a critical or abnormal lab result, we will notify you by phone as soon as possible.       We now have PWIC (Pediatric Walk in Care)  Monday-Friday from 7:30-4. Simply walk in and be seen for your urgent needs like cough, fever, rash, diarrhea or vomiting, pink eye, UTI. No appointments needed. Ask one of the team for more information      -Your Care Team:    Dr. Tracey Salcedo - Internal Medicine/Pediatrics   Dr. Tory Jerome - Family Medicine  Dr. Shania Esquivel - Pediatrics  Dr. Em Hickman - Pediatrics  Cherie Covington CNP - Family Practice Nurse Practitioner                         Follow-ups after your visit        Your next 10 appointments already scheduled     May 25, 2018  9:00 AM CDT   LAB with LAB FIRST FLOOR [facility] ([facility])    [address] [place] [address] [phone]           Please do not eat 10-12 hours before your appointment if you are coming in fasting for labs on lipids, cholesterol, or glucose (sugar). This does not apply to pregnant women. Water, hot tea and black coffee (with nothing added) are okay. Do not drink other fluids, diet soda or chew gum.              Future tests that were ordered for you today     Open Future Orders        Priority Expected Expires Ordered    DX Hip/Pelvis/Spine Routine  5/25/2019 5/25/2018    MA Screening Digital Bilateral Routine  5/25/2019 5/25/2018            Who to contact     If you have questions or need follow up information about  "today's clinic visit or your schedule please contact [facility] directly at [phone].  Normal or non-critical lab and imaging results will be communicated to you by [employer], letter or phone within 4 business days after the clinic has received the results. If you do not hear from us within 7 days, please contact the clinic through [employer] or phone. If you have a critical or abnormal lab result, we will notify you by phone as soon as possible.  Submit refill requests through [employer] or call your pharmacy and they will forward the refill request to us. Please allow 3 business days for your refill to be completed.          Additional Information About Your Visit        [employer] Information     [employer] gives you secure access to your electronic health record. If you see a primary care provider, you can also send messages to your care team and make appointments. If you have questions, please call your primary care clinic.  If you do not have a primary care provider, please call [phone] and they will assist you.      [employer] is an electronic gateway that provides easy, online access to your medical records. With [employer], you can request a clinic appointment, read your test results, renew a prescription or communicate with your care team.     To access your existing account, please contact your [facility] or call [phone] for assistance.        Care EveryWhere ID     This is your Care EveryWhere ID. This could be used by other organizations to access your [facility] medical records  [id number]        Your Vitals Were     Pulse Temperature Height Pulse Oximetry BMI (Body Mass Index)       68 97.4  F (36.3  C) (Temporal) 5' 0.63\" (1.54 m) 97% 25.74 kg/m2        Blood Pressure from Last 3 Encounters:   05/25/18 108/70   10/13/17 110/80   06/05/17 145/80    Weight from Last 3 Encounters:   05/25/18 134 lb 9.6 oz (61.1 kg)   10/13/17 139 lb 14.4 oz (63.5 kg)   06/05/17 " 141 lb 11.2 oz (64.3 kg)               Primary Care Provider Office Phone # Fax #    Cherie Covington, APRN [facility] [phone] [address]  [place] [address]        Equal Access to Services     MARIVEL RENEE : Hadii aad ku hadasho Soomaali, waaxda luqadaha, qaybta kaalmada adeegyada, waxay idiin hayaan adeeg khangelia lachiara . So [facility] [phone].    ATENCIÓN: Si habla español, tiene a espinoza disposición servicios gratuitos de asistencia lingüística. Llame al [phone].    We comply with applicable federal civil rights laws and [place] laws. We do not discriminate on the basis of race, color, national origin, age, disability, sex, sexual orientation, or gender identity.            Thank you!     Thank you for choosing [facility]  for your care. Our goal is always to provide you with excellent care. Hearing back from our patients is one way we can continue to improve our services. Please take a few minutes to complete the written survey that you may receive in the mail after your visit with us. Thank you!             Your Updated Medication List - Protect others around you: Learn how to safely use, store and throw away your medicines at www.disposemymeds.org.          This list is accurate as of 5/25/18  8:57 AM.  Always use your most recent med list.                   Brand Name Dispense Instructions for use Diagnosis    alendronate 70 MG tablet    FOSAMAX    4 tablet    TAKE 1 TAB BY MOUTH EVERY 7 DAYS 1 HR BEFORE BREAKFAST, WITH 8 OZ OF WATER. STAY UPRIGHT FOR 30 MIN    Age-related osteoporosis without current pathological fracture       ascorbic acid 500 MG tablet    VITAMIN C     Take by mouth daily    Lumbar radiculopathy, LBP (low back pain), Chronic pain disorder, Neuropathic pain, leg, unspecified laterality, Closed fracture of lumbar vertebra, sequela, Lumbar radicular pain, Right leg pain       blood glucose monitoring meter device kit     1 kit    USE  TO TEST BLOOD SUGAR 2 TIMES DAILY OR AS DIRECTED.    Type 2 diabetes mellitus without complication, without long-term current use of insulin (H)       * blood glucose monitoring test strip    BETSY CONTOUR NEXT    100 strip    Use to test blood sugar 2 times daily or as directed.    Type 2 diabetes mellitus without complication, without long-term current use of insulin (H)       * blood glucose monitoring test strip    no brand specified    100 strip    Use to test blood sugars 2 times daily or as directed    Type 2 diabetes mellitus without complication, without long-term current use of insulin (H)       CHOICEFUL MULTIVITAMIN PO       Lumbar radiculopathy, LBP (low back pain), Chronic pain disorder, Neuropathic pain, leg, unspecified laterality, Closed fracture of lumbar vertebra, sequela, Lumbar radicular pain, Right leg pain       diclofenac 1 % Gel topical gel    VOLTAREN    100 g    APPLY 4 GRAMS TWICE DAILY USING ENCLOSED DOSING CARD.    DDD (degenerative disc disease), cervical, DDD (degenerative disc disease), lumbar       FISH OIL CONCENTRATE PO       Lumbar radiculopathy, LBP (low back pain), Chronic pain disorder, Neuropathic pain, leg, unspecified laterality, Closed fracture of lumbar vertebra, sequela, Lumbar radicular pain, Right leg pain       ibuprofen 600 MG tablet    ADVIL/MOTRIN    60 tablet    TAKE ONE TABLET BY MOUTH EVERY 12 HOURS AS NEEDED FOR MODERATE PAIN    Lumbar radiculopathy       MICROLET LANCETS Misc     200 each    USE TO TEST 1 TO 2 TIMES DAILY, AND AS NEEDED    Type 2 diabetes mellitus without complication, without long-term current use of insulin (H)       order for DME     1 Units    Equipment being ordered: TENS    Neuropathic pain, leg, unspecified laterality, LBP (low back pain), Lumbar radiculopathy       order for DME     1 each    Equipment being ordered: walker  Wheeled and seated if needed    LBP (low back pain), DDD (degenerative disc disease), lumbar, Neuropathic pain,  leg, unspecified laterality       pravastatin 20 MG tablet    PRAVACHOL    90 tablet    TAKE 1 TABLET (20 MG) BY MOUTH DAILY    Hyperlipidemia LDL goal <100       VITAMIN-B COMPLEX PO       Lumbar radiculopathy, LBP (low back pain), Chronic pain disorder, Neuropathic pain, leg, unspecified laterality, Closed fracture of lumbar vertebra, sequela, Lumbar radicular pain, Right leg pain       * Notice:  This list has 2 medication(s) that are the same as other medications prescribed for you. Read the directions carefully, and ask your doctor or other care provider to review them with you.

## 2018-06-03 DIAGNOSIS — E11.9 TYPE 2 DIABETES MELLITUS WITHOUT COMPLICATION, WITHOUT LONG-TERM CURRENT USE OF INSULIN (H): ICD-10-CM

## 2018-06-05 RX ORDER — BLOOD-GLUCOSE METER
EACH MISCELLANEOUS
Refills: 0 | OUTPATIENT
Start: 2018-06-05

## 2018-06-05 NOTE — TELEPHONE ENCOUNTER
"blood glucose monitoring (ONETOUCH VERIO) meter device kit 1 kit 0 5/7/2018     Sig: USE TO TEST BLOOD SUGAR 2 TIMES DAILY OR AS DIRECTED.    Class: E-Prescribe    Notes to Pharmacy: Patient is requesting the \"Onetouch Micheline\" Patient is also due for a diabetes follow up.        Diabetic Supplies Protocol Passed6/3 2:30 PM   Patient is 18 years of age or older    Recent (6 mo) or future (30 days) visit within the authorizing provider's specialty     Our records indicate duplicate request. Same requesting pharmacy. Cally Cronin RN    "

## 2018-06-20 DIAGNOSIS — M54.16 LUMBAR RADICULOPATHY: ICD-10-CM

## 2018-06-20 RX ORDER — IBUPROFEN 600 MG/1
TABLET, FILM COATED ORAL
Qty: 60 TABLET | Refills: 2 | Status: SHIPPED | OUTPATIENT
Start: 2018-06-20 | End: 2018-09-09

## 2018-06-20 NOTE — TELEPHONE ENCOUNTER
ibuprofen (ADVIL/MOTRIN) 600 MG tablet 60 tablet 1 4/23/2018  No   Sig: TAKE ONE TABLET BY MOUTH EVERY 12 HOURS AS NEEDED FOR MODERATE PAIN     Last OV with Cherie Covington, CNP:  5/25/2018    No future apts    Lab Results   Component Value Date    ALT 51 10/13/2017     BP Readings from Last 3 Encounters:   05/25/18 108/70   10/13/17 110/80   06/05/17 145/80     Lab Results   Component Value Date    WBC 7.4 10/13/2017     Lab Results   Component Value Date    RBC 4.96 10/13/2017     Lab Results   Component Value Date    HGB 13.9 10/13/2017     Lab Results   Component Value Date    HCT 40.5 10/13/2017     No components found for: MCT  Lab Results   Component Value Date    MCV 82 10/13/2017     Lab Results   Component Value Date    MCH 28.0 10/13/2017     Lab Results   Component Value Date    MCHC 34.3 10/13/2017     Lab Results   Component Value Date    RDW 14.6 10/13/2017     Lab Results   Component Value Date     10/13/2017     Creatinine   Date Value Ref Range Status   05/25/2018 0.75 0.52 - 1.04 mg/dL Final     NSAID Medications Failed6/20 2:48 AM   Normal ALT on file in past 12 months    Patient is age 6-64 years    Blood pressure under 140/90 in past 12 months    Normal AST on file in past 12 months    Recent (12 mo) or future (30 days) visit within the authorizing provider's specialty    Normal CBC on file in past 12 months    No active pregnancy on record    Normal serum creatinine on file in past 12 months    No positive pregnancy test in past 12 months     Refilled per P protocol.    Cally Cronin RN

## 2018-06-25 DIAGNOSIS — E78.5 HYPERLIPIDEMIA LDL GOAL <100: ICD-10-CM

## 2018-06-26 RX ORDER — PRAVASTATIN SODIUM 20 MG
TABLET ORAL
Qty: 90 TABLET | Refills: 2 | OUTPATIENT
Start: 2018-06-26

## 2018-06-26 NOTE — TELEPHONE ENCOUNTER
pravastatin (PRAVACHOL) 20 MG tablet 90 tablet 3 10/13/2017  No   Sig: TAKE 1 TABLET (20 MG) BY MOUTH DAILY     Last OV with Cherie Covington, CNP: 5/28/2018    No future apts.     LDL Cholesterol Calculated   Date Value Ref Range Status   11/07/2017 93 <100 mg/dL Final     Comment:     Desirable:       <100 mg/dl     Creatinine   Date Value Ref Range Status   05/25/2018 0.75 0.52 - 1.04 mg/dL Final       Statins Protocol Passed6/25 10:47 AM   LDL on file in past 12 months    No abnormal creatine kinase in past 12 months    Recent (12 mo) or future (30 days) visit within the authorizing provider's specialty    Patient is age 18 or older    No active pregnancy on record    No positive pregnancy test in past 12 months     Refill request too soon    Cally Cronin RN

## 2018-07-03 DIAGNOSIS — E78.5 HYPERLIPIDEMIA LDL GOAL <100: ICD-10-CM

## 2018-07-03 RX ORDER — PRAVASTATIN SODIUM 20 MG
TABLET ORAL
Qty: 90 TABLET | Refills: 2 | OUTPATIENT
Start: 2018-07-03

## 2018-07-03 NOTE — TELEPHONE ENCOUNTER
pravastatin (PRAVACHOL) 20 MG tablet 90 tablet 3 10/13/2017  No   Sig: TAKE 1 TABLET (20 MG) BY MOUTH DAILY     Last OV with Cherie Covington, CNP: 5/25/2018    No future apts.     LDL Cholesterol Calculated   Date Value Ref Range Status   11/07/2017 93 <100 mg/dL Final     Comment:     Desirable:       <100 mg/dl     Creatinine   Date Value Ref Range Status   05/25/2018 0.75 0.52 - 1.04 mg/dL Final     Statins Protocol Passed7/3 11:34 AM   LDL on file in past 12 months    No abnormal creatine kinase in past 12 months    Recent (12 mo) or future (30 days) visit within the authorizing provider's specialty    Patient is age 18 or older    No active pregnancy on record    No positive pregnancy test in past 12 months     Refill request too soon. Cally Cronin RN

## 2018-07-08 DIAGNOSIS — M50.30 DDD (DEGENERATIVE DISC DISEASE), CERVICAL: ICD-10-CM

## 2018-07-08 DIAGNOSIS — M51.369 DDD (DEGENERATIVE DISC DISEASE), LUMBAR: ICD-10-CM

## 2018-07-08 DIAGNOSIS — E11.9 TYPE 2 DIABETES MELLITUS WITHOUT COMPLICATION, WITHOUT LONG-TERM CURRENT USE OF INSULIN (H): ICD-10-CM

## 2018-07-09 RX ORDER — BLOOD SUGAR DIAGNOSTIC
STRIP MISCELLANEOUS
Qty: 100 STRIP | Refills: 3 | Status: SHIPPED | OUTPATIENT
Start: 2018-07-09 | End: 2019-01-07

## 2018-07-09 NOTE — TELEPHONE ENCOUNTER
diclofenac (VOLTAREN) 1 % GEL topical gel 100 g 4 4/9/2018  No   Sig: APPLY 4 GRAMS TWICE DAILY USING ENCLOSED DOSING CARD.     Last OV with Cherie Covington CNP: 5/25/2018    No future apts.    Topical Steroid Protocol Passed7/8 4:24 AM   Patient is age 6 or older    Authorizing prescriber's most recent note related to this medication read.    High potency steroid not ordered    Recent (12 mo) or future (30 days) visit within the authorizing provider's specialty     blood glucose monitoring (BETSY CONTOUR NEXT) test strip 100 strip 3 10/13/2017  No   Sig: Use to test blood sugar 2 times daily or as directed.       Diabetic Supplies Protocol Passed7/8 4:24 AM   Patient is 18 years of age or older    Recent (6 mo) or future (30 days) visit within the authorizing provider's specialty     The above refills were not received by patients pharmacy. Resent. Cally Cronin RN

## 2018-07-19 DIAGNOSIS — M81.0 AGE-RELATED OSTEOPOROSIS WITHOUT CURRENT PATHOLOGICAL FRACTURE: ICD-10-CM

## 2018-07-19 RX ORDER — ALENDRONATE SODIUM 70 MG/1
TABLET ORAL
Qty: 4 TABLET | Refills: 2 | Status: SHIPPED | OUTPATIENT
Start: 2018-07-19 | End: 2018-10-04

## 2018-07-19 NOTE — TELEPHONE ENCOUNTER
Bisphosphonates Failed7/19 10:36 AM   Dexa on file within past 2 years     Last DEXA 6/11/15    Patient scheduled for upcoming DEXA on 11/29/18.    Routing to provider to authorize refill.    Monika Reddy RN,   [facility]

## 2018-07-31 DIAGNOSIS — E11.9 TYPE 2 DIABETES MELLITUS WITHOUT COMPLICATION, WITHOUT LONG-TERM CURRENT USE OF INSULIN (H): ICD-10-CM

## 2018-07-31 RX ORDER — BLOOD SUGAR DIAGNOSTIC
STRIP MISCELLANEOUS
Qty: 100 STRIP | Refills: 0 | OUTPATIENT
Start: 2018-07-31

## 2018-07-31 NOTE — TELEPHONE ENCOUNTER
ONETOUCH VERIO IQ test strip 100 strip 3 7/9/2018  No   Sig: USE TO TEST BLOOD SUGARS 2 TIMES DAILY OR AS DIRECTED       Diabetic Supplies Protocol Passed7/31 11:28 AM   Patient is 18 years of age or older    Recent (6 mo) or future (30 days) visit within the authorizing provider's specialty     Our records indicate duplicate request. Same requesting pharmacy. Cally Cronin RN

## 2018-08-16 DIAGNOSIS — E11.9 TYPE 2 DIABETES MELLITUS WITHOUT COMPLICATION, WITHOUT LONG-TERM CURRENT USE OF INSULIN (H): ICD-10-CM

## 2018-08-16 RX ORDER — BLOOD-GLUCOSE METER
EACH MISCELLANEOUS
Refills: 0 | OUTPATIENT
Start: 2018-08-16

## 2018-08-16 NOTE — TELEPHONE ENCOUNTER
"blood glucose monitoring (ONETOUCH VERIO) meter device kit 1 kit 0 5/7/2018  No   Sig: USE TO TEST BLOOD SUGAR 2 TIMES DAILY OR AS DIRECTED.   Class: E-Prescribe   Notes to Pharmacy: Patient is requesting the \"Onetouch Micheline\" Patient is also due for a diabetes follow up.     Last OV with Cherie Covington, CNP: 5/25/2018 Follow up in 6 months.    No future apts.     Diabetic Supplies Protocol Passed8/16 5:41 AM   Patient is 18 years of age or older    Recent (6 mo) or future (30 days) visit within the authorizing provider's specialty      blood glucose monitoring (BETSY MICROLET) lancets  MICROLET LANCETS MISC 200 each 3 10/13/2017  No   Sig: USE TO TEST 1 TO 2 TIMES DAILY, AND AS NEEDED       Diabetic Supplies Protocol Passed8/16 5:41 AM   Patient is 18 years of age or older    Recent (6 mo) or future (30 days) visit within the authorizing provider's specialty     Refilled per [facility] protocol.    Cally Cronin RN    "

## 2018-08-31 DIAGNOSIS — E78.5 HYPERLIPIDEMIA LDL GOAL <100: ICD-10-CM

## 2018-09-04 RX ORDER — PRAVASTATIN SODIUM 20 MG
TABLET ORAL
Qty: 90 TABLET | Refills: 0 | Status: SHIPPED | OUTPATIENT
Start: 2018-09-04 | End: 2018-11-19

## 2018-09-04 NOTE — TELEPHONE ENCOUNTER
pravastatin (PRAVACHOL) 20 MG tablet 90 tablet 3 10/13/2017  No   Sig: TAKE 1 TABLET (20 MG) BY MOUTH DAILY     Last OV with Cherie Covington CNP: 5/25/2018    Next 5 appointments (look out 90 days)     Nov 29, 2018  8:30 AM CST   PHYSICAL with CAMILO Benavides CNP   [facility]    [address] [place] [address] [phone]                LDL Cholesterol Calculated   Date Value Ref Range Status   11/07/2017 93 <100 mg/dL Final     Comment:     Desirable:       <100 mg/dl     Creatinine   Date Value Ref Range Status   05/25/2018 0.75 0.52 - 1.04 mg/dL Final       Statins Protocol Passed8/31 4:56 AM   LDL on file in past 12 months    No abnormal creatine kinase in past 12 months    Recent (12 mo) or future (30 days) visit within the authorizing provider's specialty    Patient is age 18 or older    No active pregnancy on record    No positive pregnancy test in past 12 months     Refilled per [facility] protocol.    Cally Cronin RN

## 2018-09-09 DIAGNOSIS — M54.16 LUMBAR RADICULOPATHY: ICD-10-CM

## 2018-09-10 RX ORDER — IBUPROFEN 600 MG/1
TABLET, FILM COATED ORAL
Qty: 60 TABLET | Refills: 2 | Status: SHIPPED | OUTPATIENT
Start: 2018-09-10 | End: 2018-11-25

## 2018-09-10 NOTE — TELEPHONE ENCOUNTER
Routing refill request to provider for review/approval because:  Patient is > 64 years of age    ibuprofen (ADVIL/MOTRIN) 600 MG tablet 60 tablet 2 6/20/2018  No   Sig: TAKE ONE TABLET BY MOUTH EVERY 12 HOURS AS NEEDED FOR MODERATE PAIN     Last OV with MEGAN Covington CNP: 5/25/2018    Next 5 appointments (look out 90 days)     Nov 29, 2018  8:30 AM CST   PHYSICAL with CAMILO Benavides CNP   [facility]    [address] [place] [address] [phone]                Lab Results   Component Value Date    ALT 51 10/13/2017     BP Readings from Last 3 Encounters:   05/25/18 108/70   10/13/17 110/80   06/05/17 145/80     Lab Results   Component Value Date    WBC 7.4 10/13/2017     Lab Results   Component Value Date    RBC 4.96 10/13/2017     Lab Results   Component Value Date    HGB 13.9 10/13/2017     Lab Results   Component Value Date    HCT 40.5 10/13/2017     No components found for: MCT  Lab Results   Component Value Date    MCV 82 10/13/2017     Lab Results   Component Value Date    MCH 28.0 10/13/2017     Lab Results   Component Value Date    MCHC 34.3 10/13/2017     Lab Results   Component Value Date    RDW 14.6 10/13/2017     Lab Results   Component Value Date     10/13/2017     Creatinine   Date Value Ref Range Status   05/25/2018 0.75 0.52 - 1.04 mg/dL Final       NSAID Medications Failed9/9 2:49 PM   Normal ALT on file in past 12 months    Patient is age 6-64 years    Blood pressure under 140/90 in past 12 months    Normal AST on file in past 12 months    Recent (12 mo) or future (30 days) visit within the authorizing provider's specialty    Normal CBC on file in past 12 months    No active pregnancy on record    Normal serum creatinine on file in past 12 months    No positive pregnancy test in past 12 months     Cally Cronin RN

## 2018-09-13 DIAGNOSIS — E11.9 TYPE 2 DIABETES MELLITUS WITHOUT COMPLICATION, WITHOUT LONG-TERM CURRENT USE OF INSULIN (H): ICD-10-CM

## 2018-09-14 RX ORDER — BLOOD-GLUCOSE METER
EACH MISCELLANEOUS
Qty: 1 KIT | Refills: 0 | Status: SHIPPED | OUTPATIENT
Start: 2018-09-14 | End: 2018-10-11

## 2018-09-14 NOTE — TELEPHONE ENCOUNTER
blood glucose monitoring (ONETOUCH VERIO) meter device kit 1 kit 0 5/7/2018  No   Sig: USE TO TEST BLOOD SUGAR 2 TIMES DAILY OR AS DIRECTED.     Last OV with MEGAN Covington CNP: 5/25/2018    Next 5 appointments (look out 90 days)     Nov 29, 2018  8:30 AM CST   PHYSICAL with CAMILO Benavides CNP   [facility]    [address] [place] [id number]   [phone]                Diabetic Supplies Protocol Passed9/13 7:49 AM   Patient is 18 years of age or older    Recent (6 mo) or future (30 days) visit within the authorizing provider's specialty     Refilled per [facility] protocol.    aClly Cronin RN

## 2018-10-04 DIAGNOSIS — M81.0 AGE-RELATED OSTEOPOROSIS WITHOUT CURRENT PATHOLOGICAL FRACTURE: ICD-10-CM

## 2018-10-04 NOTE — TELEPHONE ENCOUNTER
Routing refill request to provider for review/approval because:  Bisphosphonates Gqyfeo20/4 10:04 AM   Dexa on file within past 2 years     Last DEXA:  6/11/15    Last office visit:  5/25/18      Next 5 appointments (look out 90 days)     Nov 29, 2018  8:30 AM CST   PHYSICAL with CAMILO Benavides CNP   [facility]    [address] [place] [id number]   [phone]                Monika Reddy RN,   [facility]

## 2018-10-05 RX ORDER — ALENDRONATE SODIUM 70 MG/1
TABLET ORAL
Qty: 4 TABLET | Refills: 2 | Status: SHIPPED | OUTPATIENT
Start: 2018-10-05 | End: 2018-12-17

## 2018-10-08 DIAGNOSIS — M51.369 DDD (DEGENERATIVE DISC DISEASE), LUMBAR: ICD-10-CM

## 2018-10-08 DIAGNOSIS — M50.30 DDD (DEGENERATIVE DISC DISEASE), CERVICAL: ICD-10-CM

## 2018-10-08 NOTE — TELEPHONE ENCOUNTER
diclofenac (VOLTAREN) 1 % GEL topical gel 100 g 4 7/9/2018  No   Sig: APPLY 4 GRAMS TWICE DAILY USING ENCLOSED DOSING CARD.     Last OV with Adria CROCKETT CNP: 5/25/2018    Next 5 appointments (look out 90 days)     Nov 29, 2018  8:30 AM CST   PHYSICAL with CAMILO Benavides CNP   [facility]    [address] [place] [address]   [phone]                Topical Steroids and Nonsteroidals Protocol Ncrkeh31/8 4:59 AM   Patient is age 6 or older    Authorizing prescriber's most recent note related to this medication read.    High potency steroid not ordered    Recent (12 mo) or future (30 days) visit within the authorizing provider's specialty     Pharmacy   [id number] [place], [place] - [address] [place] [address]     Refilled per [facility] protocol.    Cally Cronin RN

## 2018-11-19 DIAGNOSIS — E78.5 HYPERLIPIDEMIA LDL GOAL <100: ICD-10-CM

## 2018-11-19 RX ORDER — PRAVASTATIN SODIUM 20 MG
TABLET ORAL
Qty: 30 TABLET | Refills: 0 | Status: SHIPPED | OUTPATIENT
Start: 2018-11-19 | End: 2018-12-22

## 2018-11-19 NOTE — TELEPHONE ENCOUNTER
pravastatin (PRAVACHOL) 20 MG tablet 90 tablet 0 9/4/2018  No   Sig: TAKE 1 TABLET (20 MG) BY MOUTH DAILY     Last OV with MEGAN Covington CNP: 5/25/2018    No future apts.     LDL Cholesterol Calculated   Date Value Ref Range Status   11/07/2017 93 <100 mg/dL Final     Comment:     Desirable:       <100 mg/dl     Creatinine   Date Value Ref Range Status   05/25/2018 0.75 0.52 - 1.04 mg/dL Final       Statins Protocol Yvnmys16/19 10:55 AM   LDL on file in past 12 months    No abnormal creatine kinase in past 12 months    Recent (12 mo) or future (30 days) visit within the authorizing provider's specialty    Patient is age 18 or older    No active pregnancy on record    No positive pregnancy test in past 12 months     Patient due for an annual physical and annual fasting lab work. Ethel refill. Patient notified by letter.     Cally Cronin RN

## 2018-11-19 NOTE — LETTER
November 19, 2018      Rissa Acosta  [address]  [address]  [place] [address]              Dear Rissa,      We recently received a call from your pharmacy requesting a refill of your medication. We have provided a 30 day refill of your medication, pravastatin (PRAVACHOL) 20 MG tablet, as requested.      A review of your chart indicates that you are due for an annual physical, diabetes follow up and fasting lab work with Cherie Covington CNP.     We have authorized a one time refill of your medication to allow time for you to schedule.     Please come in fasting for the appointment. Fasting entails nothing to eat or drink 8 hours prior to your appointment; with the exception on water. You may take your medication the day of the appointment.    Please call the clinic to schedule your appointment.    Thank you for taking an active role in your healthcare.      Sincerely,    [facility]

## 2018-11-25 DIAGNOSIS — M54.16 LUMBAR RADICULOPATHY: ICD-10-CM

## 2018-11-26 RX ORDER — IBUPROFEN 600 MG/1
TABLET, FILM COATED ORAL
Qty: 60 TABLET | Refills: 2 | Status: SHIPPED | OUTPATIENT
Start: 2018-11-26 | End: 2019-02-11

## 2018-11-26 NOTE — TELEPHONE ENCOUNTER
Routing refill request to provider for review/approval because:  Labs not current:  ALT/AST & CBC    ibuprofen (ADVIL/MOTRIN) 600 MG tablet 60 tablet 2 9/10/2018  No   Sig: TAKE ONE TABLET BY MOUTH EVERY 12 HOURS AS NEEDED FOR MODERATE PAIN     Last OV with MEGAN Covington, CNP: 5/25/18    No future apts.     Lab Results   Component Value Date    ALT 51 10/13/2017     Lab Results   Component Value Date    WBC 7.4 10/13/2017     Lab Results   Component Value Date    RBC 4.96 10/13/2017     Lab Results   Component Value Date    HGB 13.9 10/13/2017     Lab Results   Component Value Date    HCT 40.5 10/13/2017     No components found for: MCT  Lab Results   Component Value Date    MCV 82 10/13/2017     Lab Results   Component Value Date    MCH 28.0 10/13/2017     Lab Results   Component Value Date    MCHC 34.3 10/13/2017     Lab Results   Component Value Date    RDW 14.6 10/13/2017     Lab Results   Component Value Date     10/13/2017     Creatinine   Date Value Ref Range Status   05/25/2018 0.75 0.52 - 1.04 mg/dL Final     BP Readings from Last 3 Encounters:   05/25/18 108/70   10/13/17 110/80   06/05/17 145/80       NSAID Medications [id number] 11:40 AM   Normal ALT on file in past 12 months    Normal AST on file in past 12 months    Patient is age 6-64 years    Normal CBC on file in past 12 months    Blood pressure under 140/90 in past 12 months    Recent (12 mo) or future (30 days) visit within the authorizing provider's specialty    No active pregnancy on record    Normal serum creatinine on file in past 12 months    No positive pregnancy test in past 12 months     Cally Cronin RN

## 2018-12-17 DIAGNOSIS — M81.0 AGE-RELATED OSTEOPOROSIS WITHOUT CURRENT PATHOLOGICAL FRACTURE: ICD-10-CM

## 2018-12-18 RX ORDER — ALENDRONATE SODIUM 70 MG/1
TABLET ORAL
Qty: 4 TABLET | Refills: 1 | Status: SHIPPED | OUTPATIENT
Start: 2018-12-18 | End: 2019-01-17

## 2018-12-18 NOTE — TELEPHONE ENCOUNTER
Disp Refills Start End SHERRY    alendronate (FOSAMAX) 70 MG tablet 4 tablet 2 10/5/2018  No   Sig: TAKE 1 TAB BY MOUTH EVERY 7 DAYS 1 HR BEFORE BREAKFAST, WITH 8 OZ OF WATER. STAY UPRIGHT FOR 30 MIN     Last OV with MEGAN Covington CNP: 5/25/2018    Next 5 appointments (look out 90 days)    Jan 17, 2019  9:50 AM CST  PHYS[facility] with CAMILO Benavides CNP  [facility] [address] [place] [id number] [phone]        Last DEXA: 5/25/2018    Creatinine   Date Value Ref Range Status   05/25/2018 0.75 0.52 - 1.04 mg/dL Final     Refilled per [facility] protocol.    Cally Cronin RN

## 2018-12-22 DIAGNOSIS — E78.5 HYPERLIPIDEMIA LDL GOAL <100: ICD-10-CM

## 2018-12-22 DIAGNOSIS — M51.369 DDD (DEGENERATIVE DISC DISEASE), LUMBAR: ICD-10-CM

## 2018-12-22 DIAGNOSIS — M50.30 DDD (DEGENERATIVE DISC DISEASE), CERVICAL: ICD-10-CM

## 2018-12-26 RX ORDER — PRAVASTATIN SODIUM 20 MG
TABLET ORAL
Qty: 30 TABLET | Refills: 1 | Status: SHIPPED | OUTPATIENT
Start: 2018-12-26 | End: 2019-01-17

## 2018-12-26 NOTE — TELEPHONE ENCOUNTER
Due for visit. Has upcoming appointment schedule on 1/17/19.     Refills given to last until upcoming appointment.     Allyson Curry RN

## 2019-01-07 DIAGNOSIS — M50.30 DDD (DEGENERATIVE DISC DISEASE), CERVICAL: ICD-10-CM

## 2019-01-07 DIAGNOSIS — M51.369 DDD (DEGENERATIVE DISC DISEASE), LUMBAR: ICD-10-CM

## 2019-01-07 DIAGNOSIS — E11.9 TYPE 2 DIABETES MELLITUS WITHOUT COMPLICATION, WITHOUT LONG-TERM CURRENT USE OF INSULIN (H): ICD-10-CM

## 2019-01-10 RX ORDER — BLOOD SUGAR DIAGNOSTIC
STRIP MISCELLANEOUS
Qty: 100 STRIP | Refills: 3 | Status: SHIPPED | OUTPATIENT
Start: 2019-01-10

## 2019-01-10 NOTE — TELEPHONE ENCOUNTER
Refilled per protocol. Patient is scheduled to be seen 1/17/19.    Debra Salomon RN   [facility]

## 2019-01-17 ENCOUNTER — ANCILLARY PROCEDURE (OUTPATIENT)
Dept: MAMMOGRAPHY | Facility: CLINIC | Age: 69
End: 2019-01-17
Attending: NURSE PRACTITIONER
Payer: COMMERCIAL

## 2019-01-17 ENCOUNTER — OFFICE VISIT (OUTPATIENT)
Dept: PEDIATRICS | Facility: CLINIC | Age: 69
End: 2019-01-17
Payer: COMMERCIAL

## 2019-01-17 VITALS
OXYGEN SATURATION: 99 % | WEIGHT: 137.1 LBS | HEIGHT: 61 IN | HEART RATE: 79 BPM | BODY MASS INDEX: 25.89 KG/M2 | SYSTOLIC BLOOD PRESSURE: 110 MMHG | TEMPERATURE: 97.5 F | DIASTOLIC BLOOD PRESSURE: 80 MMHG

## 2019-01-17 DIAGNOSIS — E11.9 TYPE 2 DIABETES MELLITUS WITHOUT COMPLICATION, WITHOUT LONG-TERM CURRENT USE OF INSULIN (H): ICD-10-CM

## 2019-01-17 DIAGNOSIS — E78.5 HYPERLIPIDEMIA LDL GOAL <100: ICD-10-CM

## 2019-01-17 DIAGNOSIS — M81.0 AGE-RELATED OSTEOPOROSIS WITHOUT CURRENT PATHOLOGICAL FRACTURE: ICD-10-CM

## 2019-01-17 DIAGNOSIS — Z71.89 ADVANCED DIRECTIVES, COUNSELING/DISCUSSION: ICD-10-CM

## 2019-01-17 DIAGNOSIS — Z00.00 ROUTINE GENERAL MEDICAL EXAMINATION AT A HEALTH CARE FACILITY: Primary | ICD-10-CM

## 2019-01-17 DIAGNOSIS — Z13.29 SCREENING FOR THYROID DISORDER: ICD-10-CM

## 2019-01-17 DIAGNOSIS — Z00.00 ENCOUNTER FOR ROUTINE ADULT HEALTH EXAMINATION WITHOUT ABNORMAL FINDINGS: Primary | ICD-10-CM

## 2019-01-17 DIAGNOSIS — E55.9 VITAMIN D INSUFFICIENCY: ICD-10-CM

## 2019-01-17 LAB
ALBUMIN SERPL-MCNC: 3.9 G/DL (ref 3.4–5)
ALP SERPL-CCNC: 92 U/L (ref 40–150)
ALT SERPL W P-5'-P-CCNC: 44 U/L (ref 0–50)
ANION GAP SERPL CALCULATED.3IONS-SCNC: 6 MMOL/L (ref 3–14)
AST SERPL W P-5'-P-CCNC: 32 U/L (ref 0–45)
BILIRUB SERPL-MCNC: 0.6 MG/DL (ref 0.2–1.3)
BUN SERPL-MCNC: 17 MG/DL (ref 7–30)
CALCIUM SERPL-MCNC: 8.6 MG/DL (ref 8.5–10.1)
CHLORIDE SERPL-SCNC: 109 MMOL/L (ref 94–109)
CHOLEST SERPL-MCNC: 158 MG/DL
CO2 SERPL-SCNC: 28 MMOL/L (ref 20–32)
CREAT SERPL-MCNC: 0.73 MG/DL (ref 0.52–1.04)
CREAT UR-MCNC: 208 MG/DL
GFR SERPL CREATININE-BSD FRML MDRD: 84 ML/MIN/{1.73_M2}
GLUCOSE SERPL-MCNC: 121 MG/DL (ref 70–99)
HBA1C MFR BLD: 6.7 % (ref 0–5.6)
HDLC SERPL-MCNC: 61 MG/DL
LDLC SERPL CALC-MCNC: 78 MG/DL
MICROALBUMIN UR-MCNC: 13 MG/L
MICROALBUMIN/CREAT UR: 6.44 MG/G CR (ref 0–25)
NONHDLC SERPL-MCNC: 97 MG/DL
POTASSIUM SERPL-SCNC: 4.7 MMOL/L (ref 3.4–5.3)
PROT SERPL-MCNC: 8.4 G/DL (ref 6.8–8.8)
SODIUM SERPL-SCNC: 143 MMOL/L (ref 133–144)
TRIGL SERPL-MCNC: 96 MG/DL
TSH SERPL DL<=0.005 MIU/L-ACNC: 0.9 MU/L (ref 0.4–4)

## 2019-01-17 PROCEDURE — 80061 LIPID PANEL: CPT | Performed by: NURSE PRACTITIONER

## 2019-01-17 PROCEDURE — 80053 COMPREHEN METABOLIC PANEL: CPT | Performed by: NURSE PRACTITIONER

## 2019-01-17 PROCEDURE — 36415 COLL VENOUS BLD VENIPUNCTURE: CPT | Performed by: NURSE PRACTITIONER

## 2019-01-17 PROCEDURE — 77067 SCR MAMMO BI INCL CAD: CPT | Performed by: STUDENT IN AN ORGANIZED HEALTH CARE EDUCATION/TRAINING PROGRAM

## 2019-01-17 PROCEDURE — 83036 HEMOGLOBIN GLYCOSYLATED A1C: CPT | Performed by: NURSE PRACTITIONER

## 2019-01-17 PROCEDURE — 99207 C FOOT EXAM  NO CHARGE: CPT | Performed by: NURSE PRACTITIONER

## 2019-01-17 PROCEDURE — 84443 ASSAY THYROID STIM HORMONE: CPT | Performed by: NURSE PRACTITIONER

## 2019-01-17 PROCEDURE — 82306 VITAMIN D 25 HYDROXY: CPT | Performed by: NURSE PRACTITIONER

## 2019-01-17 PROCEDURE — 82043 UR ALBUMIN QUANTITATIVE: CPT | Performed by: NURSE PRACTITIONER

## 2019-01-17 PROCEDURE — G0439 PPPS, SUBSEQ VISIT: HCPCS | Performed by: NURSE PRACTITIONER

## 2019-01-17 RX ORDER — PRAVASTATIN SODIUM 20 MG
20 TABLET ORAL DAILY
Qty: 90 TABLET | Refills: 3 | Status: SHIPPED | OUTPATIENT
Start: 2019-01-17 | End: 2020-01-07

## 2019-01-17 RX ORDER — ALENDRONATE SODIUM 70 MG/1
TABLET ORAL
Qty: 4 TABLET | Refills: 3 | Status: SHIPPED | OUTPATIENT
Start: 2019-01-17 | End: 2019-04-25

## 2019-01-17 ASSESSMENT — MIFFLIN-ST. JEOR: SCORE: 1080.29

## 2019-01-17 NOTE — RESULT ENCOUNTER NOTE
Kishor Acosta,    Attached are your test results.  -Cholesterol levels are at your goal levels.  ADVISE: continuing your medication, a regular exercise program with at least 150 minutes of aerobic exercise per week, and eating a low saturated fat/low carbohydrate diet.  Also, you should recheck this fasting cholesterol panel in 12 months.  -Liver and gallbladder tests (ALT,AST, Alk phos,bilirubin) are normal.  -Kidney function (GFR) is normal.  -Sodium is normal.  -Potassium is normal.  -Calcium is normal.  -Glucose is elevated due to your diabetes.  -A1C (test of diabetes control the last 2-3 months) is at your goal. Please continue with your current plan. Also, you should make an appointment to see me and recheck your A1C test in 6 months.   -TSH (thyroid stimulating hormone) level is normal which indicates normal thyroid function.  -Microalbumin (urine protein) test is normal.  ADVISE: rechecking this annually.   Please contact us if you have any questions.    Cherie Covington, CNP

## 2019-01-17 NOTE — NURSING NOTE
"Chief Complaint   Patient presents with     Wellness Visit     Physical       Initial /80 (BP Location: Right arm, Patient Position: Sitting, Cuff Size: Adult Regular)   Pulse 79   Temp 97.5  F (36.4  C) (Temporal)   Ht 1.543 m (5' 0.75\")   Wt 62.2 kg (137 lb 1.6 oz)   SpO2 99%   Breastfeeding? No   BMI 26.12 kg/m   Estimated body mass index is 26.12 kg/m  as calculated from the following:    Height as of this encounter: 1.543 m (5' 0.75\").    Weight as of this encounter: 62.2 kg (137 lb 1.6 oz).  Medication Reconciliation: complete      CHRIS Holt      "

## 2019-01-17 NOTE — PROGRESS NOTES
"  SUBJECTIVE:   Rissa Acosta is a 68 year old female who presents for Preventive Visit.    Are you in the first 12 months of your Medicare Part B coverage?  No    Physical Health:    In general, how would you rate your overall physical health? fair    Outside of work, how many days during the week do you exercise? 4-5 days/week walking daily    Outside of work, approximately how many minutes a day do you exercise?30-45 minutes    If you drink alcohol do you typically have >3 drinks per day or >7 drinks per week? No    Do you usually eat at least 4 servings of fruit and vegetables a day, include whole grains & fiber and avoid regularly eating high fat or \"junk\" foods? Yes    Do you have any problems taking medications regularly?  No    Do you have any side effects from medications? none    Needs assistance for the following daily activities: no assistance needed    Which of the following safety concerns are present in your home?  none identified     Hearing impairment: No    In the past 6 months, have you been bothered by leaking of urine? no    Mental Health:    In general, how would you rate your overall mental or emotional health? good  PHQ-2 Score:      Do you feel safe in your environment? Yes    Do you have a Health Care Directive? No: Advance care planning was reviewed with patient; patient declined at this time.    Additional concerns to address?  No    Fall risk:  Fallen 2 or more times in the past year?: No  Any fall with injury in the past year?: No    Cognitive Screening: Not appropriate due to language barrier     Do you have sleep apnea, excessive snoring or daytime drowsiness?: no    Diabetes Follow-up    Patient is checking blood sugars: twice daily.    Blood sugar testing frequency justification: Patient modifying lifestyle changes (diet, exercise) with blood sugars  Results are as follows:         am - 120-200         bedtime - 120-200    Diabetic concerns: blood sugar frequently over 200     " Symptoms of hypoglycemia (low blood sugar): none     Paresthesias (numbness or burning in feet) or sores: No     Date of last diabetic eye exam: 1 year ago    BP Readings from Last 2 Encounters:   01/17/19 110/80   05/25/18 108/70     Hemoglobin A1C (%)   Date Value   01/17/2019 6.7 (H)   05/25/2018 6.4 (H)     LDL Cholesterol Calculated (mg/dL)   Date Value   01/17/2019 78   11/07/2017 93       Diabetes Management Resources    Hyperlipidemia Follow-Up      Rate your low fat/cholesterol diet?: good    Taking statin?  Yes, no muscle aches from statin    Other lipid medications/supplements?:  Fish oil/Omega 3, without side effects      Reviewed and updated as needed this visit by clinical staff  Tobacco  Allergies  Meds  Med Hx  Surg Hx  Fam Hx  Soc Hx        Reviewed and updated as needed this visit by Provider        Social History     Tobacco Use     Smoking status: Never Smoker     Smokeless tobacco: Never Used   Substance Use Topics     Alcohol use: No                           Current providers sharing in care for this patient include:   Patient Care Team:  Cherie Covington APRN CNP as PCP - General (Family Practice)  Kalia Reddy MD as MD (Ophthalmology)  Mojgan Dave MD (Ophthalmology)    The following health maintenance items are reviewed in Epic and correct as of today:  Health Maintenance   Topic Date Due     ZOSTER IMMUNIZATION (1 of 2) 02/10/1999     FOOT EXAM Q1 YEAR  09/08/2017     EYE EXAM Q1 YEAR  07/10/2018     FALL RISK ASSESSMENT  10/13/2018     ADVANCE DIRECTIVE PLANNING Q5 YRS  12/24/2018     A1C Q6 MO  07/17/2019     MAMMO SCREEN Q2 YR (SYSTEM ASSIGNED)  09/13/2019     COLON CANCER SCREEN (SYSTEM ASSIGNED)  01/08/2020     CREATININE Q1 YEAR  01/17/2020     LIPID MONITORING Q1 YEAR  01/17/2020     MICROALBUMIN Q1 YEAR  01/17/2020     PHQ-2 Q1 YR  01/17/2020     TSH W/ FREE T4 REFLEX Q2 YEAR  01/17/2021     DTAP/TDAP/TD IMMUNIZATION (3 - Td) 12/10/2025     DEXA SCAN  SCREENING (SYSTEM ASSIGNED)  Completed     INFLUENZA VACCINE  Completed     PNEUMOVAX IMMUNIZATION 65+ LOW/MEDIUM RISK  Completed     HEPATITIS C SCREENING  Completed     IPV IMMUNIZATION  Aged Out     MENINGITIS IMMUNIZATION  Aged Out     Labs reviewed in EPIC  BP Readings from Last 3 Encounters:   01/17/19 110/80   05/25/18 108/70   10/13/17 110/80    Wt Readings from Last 3 Encounters:   01/17/19 62.2 kg (137 lb 1.6 oz)   05/25/18 61.1 kg (134 lb 9.6 oz)   10/13/17 63.5 kg (139 lb 14.4 oz)                  Patient Active Problem List   Diagnosis     CARDIOVASCULAR SCREENING; LDL GOAL LESS THAN 160     DDD (degenerative disc disease), cervical     DDD (degenerative disc disease), lumbar     Vitamin D insufficiency     Elevated LFTs     Positive PPD     Bilateral low back pain without sciatica     Lumbar radiculopathy     Advanced directives, counseling/discussion     Closed fracture of lumbar vertebra (H)     Spinal stenosis of lumbar region     Viral hepatitis     Type 2 diabetes mellitus without complication, without long-term current use of insulin (H)     Osteopenia     Neuropathic pain, leg     Exposure to hepatitis B     Spinal stenosis of lumbar region without neurogenic claudication     Osteoporosis     Hyperlipidemia LDL goal <100     Past Surgical History:   Procedure Laterality Date     BACK SURGERY  2011    [facility]     CATARACT IOL, RT/LT Right 2005       Social History     Tobacco Use     Smoking status: Never Smoker     Smokeless tobacco: Never Used   Substance Use Topics     Alcohol use: No     Family History   Problem Relation Age of Onset     Diabetes No family hx of      Macular Degeneration No family hx of      Glaucoma No family hx of      Strabismus No family hx of          Current Outpatient Medications   Medication Sig Dispense Refill     alendronate (FOSAMAX) 70 MG tablet TAKE 1 TAB BY MOUTH EVERY 7 DAYS 1 HR BEFORE BREAKFAST, WITH 8 OZ OF WATER. STAY UPRIGHT FOR 30 MIN 4  tablet 1     ascorbic acid (VITAMIN C) 500 MG tablet Take by mouth daily       B Complex Vitamins (VITAMIN-B COMPLEX PO)        blood glucose monitoring (BETSY CONTOUR NEXT) test strip Use to test blood sugar 2 times daily or as directed. 100 strip 3     blood glucose monitoring (BETSY MICROLET) lancets USE TO TEST 1 TO 2 TIMES DAILY, AND AS NEEDED 200 each 3     blood glucose monitoring (ONETOUCH VERIO) meter device kit USE TO TEST BLOOD SUGAR 2 TIMES DAILY OR AS DIRECTED. 1 kit 0     diclofenac (VOLTAREN) 1 % topical gel APPLY 4 GRAMS TOPICALLY TO THE AFFECETED AREA TWICE DAILY USING ENCLOSED DOSING CARD. *MAKE APPT* 100 g 0     ibuprofen (ADVIL/MOTRIN) 600 MG tablet TAKE ONE TABLET BY MOUTH EVERY 12 HOURS AS NEEDED FOR MODERATE PAIN 60 tablet 2     Multiple Vitamins-Minerals (CHOICEFUL MULTIVITAMIN PO)        Omega-3 Fatty Acids (FISH OIL CONCENTRATE PO)        pravastatin (PRAVACHOL) 20 MG tablet TAKE 1 TABLET BY MOUTH EVERY DAY 30 tablet 1     ONETOUCH VERIO IQ test strip USE TO TEST BLOOD SUGARS 2 TIMES DAILY OR AS DIRECTED 100 strip 3     ORDER FOR DME Equipment being ordered: walker  Wheeled and seated if needed (Patient not taking: Reported on 1/17/2019) 1 each 0     ORDER FOR DME Equipment being ordered: TENS 1 Units 0     No Known Allergies  Recent Labs   Lab Test 01/17/19  0911 05/25/18  0800 11/07/17  0735 10/13/17  1038  08/22/16  1044   A1C 6.7* 6.4*  --  6.1*   < >  --    LDL 78  --  93 121*  --  82   HDL 61  --  53 59  --  56   TRIG 96  --  138 128  --  97   ALT 44  --   --  51*  --  33   CR 0.73 0.75  --  0.69   < > 0.69   GFRESTIMATED 84 76  --  84   < > 85   GFRESTBLACK >90 >90  --  >90   < > >90  African American GFR Calc     POTASSIUM 4.7 4.3  --  4.4   < > 4.2   TSH 0.90  --   --  0.56  --  0.92    < > = values in this interval not displayed.      Pneumonia Vaccine:Up to date    Mammogram Screening: Mammogram Screening: Patient over age 50, mutual decision to screen reflected in health  "maintenance.    ROS:  CONSTITUTIONAL:NEGATIVE for fever, chills, change in weight  INTEGUMENTARY/SKIN: NEGATIVE for worrisome rashes, moles or lesions  EYES: NEGATIVE for vision changes or irritation  ENT: NEGATIVE for ear, mouth and throat problems  RESP:NEGATIVE for significant cough or SOB  BREAST: NEGATIVE for masses, tenderness or discharge  CV: NEGATIVE for chest pain, palpitations or peripheral edema  GI: NEGATIVE for nausea, abdominal pain, heartburn, or change in bowel habits   menopausal female: amenorrhea, no unusual urinary symptoms and no unusual vaginal symptoms  MUSCULOSKELETAL:NEGATIVE for significant arthralgias or myalgia. Does have some intermittent pain and numbness into the left arm seems to be localized.   NEURO: NEGATIVE for weakness, dizziness or paresthesias  ENDOCRINE: NEGATIVE for temperature intolerance, skin/hair changes and POSITIVE  for HX diabetes  HEME/ALLERGY/IMMUNE: NEGATIVE for bleeding problems  PSYCHIATRIC: NEGATIVE for changes in mood or affect     OBJECTIVE:   /80 (BP Location: Right arm, Patient Position: Sitting, Cuff Size: Adult Regular)   Pulse 79   Temp 97.5  F (36.4  C) (Temporal)   Ht 1.543 m (5' 0.75\")   Wt 62.2 kg (137 lb 1.6 oz)   SpO2 99%   Breastfeeding? No   BMI 26.12 kg/m   Estimated body mass index is 25.74 kg/m  as calculated from the following:    Height as of 5/25/18: 1.54 m (5' 0.63\").    Weight as of 5/25/18: 61.1 kg (134 lb 9.6 oz).   Wt Readings from Last 4 Encounters:   01/17/19 62.2 kg (137 lb 1.6 oz)   05/25/18 61.1 kg (134 lb 9.6 oz)   10/13/17 63.5 kg (139 lb 14.4 oz)   06/05/17 64.3 kg (141 lb 11.2 oz)       EXAM:   GENERAL APPEARANCE: healthy, alert and no distress  EYES: Eyes grossly normal to inspection and conjunctivae and sclerae normal  HENT: ear canals and TM's normal, nose and mouth without ulcers or lesions, oropharynx clear and oral mucous membranes moist  NECK: no adenopathy, no asymmetry, masses, or scars and thyroid " normal to palpation  RESP: lungs clear to auscultation - no rales, rhonchi or wheezes  BREAST: normal without masses, tenderness or nipple discharge and no palpable axillary masses or adenopathy  CV: regular rates and rhythm, no murmur, click or rub, no peripheral edema and peripheral pulses strong  ABDOMEN: soft, nontender, no hepatosplenomegaly, no masses and bowel sounds normal   (female): deferred  MS: no musculoskeletal defects are noted and gait is age appropriate without ataxia  SKIN: no suspicious lesions or rashes  NEURO: Normal strength and tone, sensory exam grossly normal, mentation intact and speech normal  PSYCH: mentation appears normal and affect normal/bright    Diagnostic Test Results:  Results for orders placed or performed in visit on 01/17/19   Lipid panel reflex to direct LDL Fasting   Result Value Ref Range    Cholesterol 158 <200 mg/dL    Triglycerides 96 <150 mg/dL    HDL Cholesterol 61 >49 mg/dL    LDL Cholesterol Calculated 78 <100 mg/dL    Non HDL Cholesterol 97 <130 mg/dL   Comprehensive metabolic panel   Result Value Ref Range    Sodium 143 133 - 144 mmol/L    Potassium 4.7 3.4 - 5.3 mmol/L    Chloride 109 94 - 109 mmol/L    Carbon Dioxide 28 20 - 32 mmol/L    Anion Gap 6 3 - 14 mmol/L    Glucose 121 (H) 70 - 99 mg/dL    Urea Nitrogen 17 7 - 30 mg/dL    Creatinine 0.73 0.52 - 1.04 mg/dL    GFR Estimate 84 >60 mL/min/[1.73_m2]    GFR Estimate If Black >90 >60 mL/min/[1.73_m2]    Calcium 8.6 8.5 - 10.1 mg/dL    Bilirubin Total 0.6 0.2 - 1.3 mg/dL    Albumin 3.9 3.4 - 5.0 g/dL    Protein Total 8.4 6.8 - 8.8 g/dL    Alkaline Phosphatase 92 40 - 150 U/L    ALT 44 0 - 50 U/L    AST 32 0 - 45 U/L   Albumin Random Urine Quantitative with Creat Ratio   Result Value Ref Range    Creatinine Urine 208 mg/dL    Albumin Urine mg/L 13 mg/L    Albumin Urine mg/g Cr 6.44 0 - 25 mg/g Cr   TSH with free T4 reflex   Result Value Ref Range    TSH 0.90 0.40 - 4.00 mU/L   Hemoglobin A1c   Result Value Ref  Range    Hemoglobin A1C 6.7 (H) 0 - 5.6 %   Vitamin D Deficiency   Result Value Ref Range    Vitamin D Deficiency screening 26 20 - 75 ug/L       ASSESSMENT / PLAN:   Rissa was seen today for wellness visit and physical.    Diagnoses and all orders for this visit:    Encounter for routine adult health examination without abnormal findings  -     Lipid panel reflex to direct LDL Fasting  -     Comprehensive metabolic panel  -     JUST IN CASE  -     Albumin Random Urine Quantitative with Creat Ratio  -     TSH with free T4 reflex  -     Hemoglobin A1c  -     Vitamin D Deficiency    Vitamin D insufficiency  -     Vitamin D Deficiency    Type 2 diabetes mellitus without complication, without long-term current use of insulin (H)  -     Comprehensive metabolic panel  -     Albumin Random Urine Quantitative with Creat Ratio  -     Hemoglobin A1c  -     blood glucose monitoring (BETSY MICROLET) lancets; USE TO TEST 1 TO 2 TIMES DAILY, AND AS NEEDED  -     blood glucose (BETSY CONTOUR NEXT) test strip; Use to test blood sugar 2 times daily or as directed.  -     C FOOT EXAM  NO CHARGE  Continue current medication regimen unchanged.  PLAN:  Please follow-up in 6 month(s).    Hyperlipidemia LDL goal <100  -     Lipid panel reflex to direct LDL Fasting  -     Comprehensive metabolic panel  -     pravastatin (PRAVACHOL) 20 MG tablet; Take 1 tablet (20 mg) by mouth daily  Continue current medication regimen unchanged.    Screening for thyroid disorder  -     TSH with free T4 reflex    Age-related osteoporosis without current pathological fracture  -     alendronate (FOSAMAX) 70 MG tablet; TAKE 1 TAB BY MOUTH EVERY 7 DAYS 1 HR BEFORE BREAKFAST, WITH 8 OZ OF WATER. STAY UPRIGHT FOR 30 MIN    Advanced directives, counseling/discussion  Advance care planning reviewed with patient; information given to patient to review.      PLAN:    Patient needs to follow up in if no improvement,or sooner if worsening of symptoms or other symptoms  "develop.  Would recommend shingles vaccine and eye exam update   Follow up in 6 months.  Follow up office visit in one year for annual health maintenance exam, sooner PRN.      End of Life Planning:  Patient currently has an advanced directive: No.  I have verified the patient's ablity to prepare an advanced directive/make health care decisions.  Literature was provided to assist patient in preparing an advanced directive.    COUNSELING:  Reviewed preventive health counseling, as reflected in patient instructions  Special attention given to:       Regular exercise       Healthy diet/nutrition       Vision screening       Osteoporosis Prevention/Bone Health       Colon cancer screening       Hepatitis C screening       The 10-year ASCVD risk score (Jeannie EDWARDS Jr., et al., 2013) is: 10.9%    Values used to calculate the score:      Age: 68 years      Sex: Female      Is Non- : No      Diabetic: Yes      Tobacco smoker: No      Systolic Blood Pressure: 110 mmHg      Is BP treated: No      HDL Cholesterol: 61 mg/dL      Total Cholesterol: 158 mg/dL       Advanced Planning     BP Readings from Last 1 Encounters:   01/17/19 110/80     Estimated body mass index is 26.12 kg/m  as calculated from the following:    Height as of this encounter: 1.543 m (5' 0.75\").    Weight as of this encounter: 62.2 kg (137 lb 1.6 oz).           reports that  has never smoked. she has never used smokeless tobacco.      Appropriate preventive services were discussed with this patient, including applicable screening as appropriate for cardiovascular disease, diabetes, osteopenia/osteoporosis, and glaucoma.  As appropriate for age/gender, discussed screening for colorectal cancer, prostate cancer, breast cancer, and cervical cancer. Checklist reviewing preventive services available has been given to the patient.    Reviewed patients plan of care and provided an AVS. The Intermediate Care Plan ( asthma action plan, low back " pain action plan, and migraine action plan) for Rissa meets the Care Plan requirement. This Care Plan has been established and reviewed with the Patient and daughter.    Counseling Resources:  ATP IV Guidelines  Pooled Cohorts Equation Calculator  Breast Cancer Risk Calculator  FRAX Risk Assessment  ICSI Preventive Guidelines  Dietary Guidelines for Americans, 2010  USDA's MyPlate  ASA Prophylaxis  Lung CA Screening    CAMILO Benavides CNP  [facility]

## 2019-01-17 NOTE — PATIENT INSTRUCTIONS
PLAN:   1.   Symptomatic therapy suggested: Continue current medications.=  2.  Orders Placed This Encounter   Medications     pravastatin (PRAVACHOL) 20 MG tablet     Sig: Take 1 tablet (20 mg) by mouth daily     Dispense:  90 tablet     Refill:  3     blood glucose monitoring (BETSY MICROLET) lancets     Sig: USE TO TEST 1 TO 2 TIMES DAILY, AND AS NEEDED     Dispense:  200 each     Refill:  3     blood glucose (BETSY CONTOUR NEXT) test strip     Sig: Use to test blood sugar 2 times daily or as directed.     Dispense:  100 strip     Refill:  3     alendronate (FOSAMAX) 70 MG tablet     Sig: TAKE 1 TAB BY MOUTH EVERY 7 DAYS 1 HR BEFORE BREAKFAST, WITH 8 OZ OF WATER. STAY UPRIGHT FOR 30 MIN     Dispense:  4 tablet     Refill:  3     Orders Placed This Encounter   Procedures     C FOOT EXAM  NO CHARGE     Lipid panel reflex to direct LDL Fasting     Comprehensive metabolic panel     JUST IN CASE     Albumin Random Urine Quantitative with Creat Ratio     TSH with free T4 reflex     Hemoglobin A1c     Vitamin D Deficiency       3. Patient needs to follow up in if no improvement,or sooner if worsening of symptoms or other symptoms develop.  Would recommend shingles vaccine and eye exam update   Follow up in 6 months.  Follow up office visit in one year for annual health maintenance exam, sooner PRN.    Preventive Health Recommendations    See your health care provider every year to    Review health changes.     Discuss preventive care.      Review your medicines if your doctor has prescribed any.      You no longer need a yearly Pap test unless you've had an abnormal Pap test in the past 10 years. If you have vaginal symptoms, such as bleeding or discharge, be sure to talk with your provider about a Pap test.      Every 1 to 2 years, have a mammogram.  If you are over 69, talk with your health care provider about whether or not you want to continue having screening mammograms.      Every 10 years, have a colonoscopy. Or,  have a yearly FIT test (stool test). These exams will check for colon cancer.       Have a cholesterol test every 5 years, or more often if your doctor advises it.       Have a diabetes test (fasting glucose) every three years. If you are at risk for diabetes, you should have this test more often.       At age 65, have a bone density scan (DEXA) to check for osteoporosis (brittle bone disease).    Shots:    Get a flu shot each year.    Get a tetanus shot every 10 years.    Talk to your doctor about your pneumonia vaccines. There are now two you should receive - Pneumovax (PPSV 23) and Prevnar (PCV 13).    Talk to your pharmacist about the shingles vaccine.    Talk to your doctor about the hepatitis B vaccine.    Nutrition:     Eat at least 5 servings of fruits and vegetables each day.      Eat whole-grain bread, whole-wheat pasta and brown rice instead of white grains and rice.      Get adequate Calcium and Vitamin D.     Lifestyle    Exercise at least 150 minutes a week (30 minutes a day, 5 days a week). This will help you control your weight and prevent disease.      Limit alcohol to one drink per day.      No smoking.       Wear sunscreen to prevent skin cancer.       See your dentist twice a year for an exam and cleaning.      See your eye doctor every 1 to 2 years to screen for conditions such as glaucoma, macular degeneration and cataracts.    Personalized Prevention Plan  You are due for the preventive services outlined below.  Your care team is available to assist you in scheduling these services.  If you have already completed any of these items, please share that information with your care team to update in your medical record.  Health Maintenance Due   Topic Date Due     Zoster (Chicken Pox) Vaccine (1 of 2) 02/10/1999     Diabetic Foot Exam - yearly  09/08/2017     Eye Exam - yearly  07/10/2018     FALL RISK ASSESSMENT  10/13/2018     Microalbumin Lab - yearly  10/13/2018     Cholesterol Lab - yearly   11/07/2018     A1C (Diabetes) Lab - every 6 months  11/25/2018     Discuss Advance Directive Planning  12/24/2018

## 2019-01-18 LAB — DEPRECATED CALCIDIOL+CALCIFEROL SERPL-MC: 26 UG/L (ref 20–75)

## 2019-01-19 NOTE — RESULT ENCOUNTER NOTE
Kishor Acosta,    Attached are your test results.  -Vitamin D level is mildly below normal and getting 1000 IU daily in supplements is recommended.    Please contact us if you have any questions.    Cherie Covington, CNP

## 2019-02-08 ENCOUNTER — PATIENT OUTREACH (OUTPATIENT)
Dept: GERIATRIC MEDICINE | Facility: CLINIC | Age: 69
End: 2019-02-08

## 2019-02-08 PROBLEM — Z76.89 HEALTH CARE HOME: Status: ACTIVE | Noted: 2019-02-08

## 2019-02-08 NOTE — PROGRESS NOTES
[facility] Care Coordination Contact    Member became effective with  [facility] on 2/1/2019 with [facility] MSC+.  Previous Health Plan: [facility] MSC+  Previous Care System: [facility]  Previous care coordinators name and number: Destinee Alexis  Janes Type: N/A  Last MMIS Entry: Date 5/31/18 and Type ROV  MMIS visit date if different from above: 6/27/17. ROV  Services Listed in MMIS: NA  Member currently receiving the following home care services:     Member currently receiving the following community resources:    [facility] received: No: Requested on 2/8/2019     Flor Peterson  Care Management Specialist  [facility]  [phone]

## 2019-02-08 NOTE — PROGRESS NOTES
[facility] Care Coordination Contact      [facility] Care System Change (Transfer)    Member is new enrollee to [facility] effective 2/1/2019 with [facility] health plan. Member transferred from [facility].    No home visit required because this care coordinator (CC) has received all required documentation from the previous CC.  Member was a Refusal of Visit on 10/25/2018.    Writer t/c to member, introduced self as member's new CC. Confirmed with member that the welcome letter with writer's name and contact information has been received.  Reviewed LTCC/Health Risk Assessment (HRA) and POC with member. No changes noted.  Transitional HRA completed. Care Plan Summary updated and reflects current services.  Required referral authorization information communicated to CMS: No    MMIS entry completed by: Care Coordinator    Writer reviewed the following with member:    ER visits: No  Hospitalizations: No  TCU stays: No  Significant health status changes: None noted  Falls/Injuries: No  ADL/IADL changes: No  Changes in services: No    Follow-Up Plan: Member informed of future contact, plan to f/u with member with at next regularly scheduled contact.  Contact information shared with member and family, encouraged member to call with any questions or concerns.  Cuauhtemoc Soriano RN   [facility]   [phone]

## 2019-02-08 NOTE — LETTER
February 8, 2019    CHARLI LIPSCOMB  [address]  [address]  [place] [address]      Dear Charli:    As a member of [facility] (MSC+) you are provided a care coordinator. I will be your new care coordinator as of 2/1/2019. I will be calling you soon to see how you are doing and determine your needs.    If you have any questions, please feel free to call me at [phone]. If you reach my voice mail, please leave a message and your phone number. If you are hearing impaired, please call the [place] Relay at 092 or [phone] (fcndtp-xi-czdors relay service).    I look forward to speaking with you soon.    Sincerely,        Cuauhtemoc Soriano RN    E-mail: [email or web address]  Phone: [phone]      [facility]          MSC+ [facility]  [id number] DHS Approved ([id number])  [id number] (11/18)

## 2019-02-11 DIAGNOSIS — M54.16 LUMBAR RADICULOPATHY: ICD-10-CM

## 2019-02-12 DIAGNOSIS — M50.30 DDD (DEGENERATIVE DISC DISEASE), CERVICAL: ICD-10-CM

## 2019-02-12 DIAGNOSIS — M51.369 DDD (DEGENERATIVE DISC DISEASE), LUMBAR: ICD-10-CM

## 2019-02-12 RX ORDER — IBUPROFEN 600 MG/1
TABLET, FILM COATED ORAL
Qty: 60 TABLET | Refills: 1 | Status: SHIPPED | OUTPATIENT
Start: 2019-02-12 | End: 2019-04-16

## 2019-02-12 NOTE — TELEPHONE ENCOUNTER
Routing refill request to provider for review/approval because:  Needs updated CBC  Not within age parameter    Name from pharmacy: IBUPROFEN 600 MG TABLET          Will file in chart as: ibuprofen (ADVIL/MOTRIN) 600 MG tablet    Sig: TAKE ONE TABLET BY MOUTH EVERY 12 HOURS AS NEEDED FOR MODERATE PAIN    Disp:  60 tablet    Refills:  2    Start: 2/11/2019    Class: E-Prescribe    For: Lumbar radiculopathy    Requested on: 11/26/2018    Last ordered: 2 months ago by CAMILO Benavides CNP Last refill: 1/16/2019    Rx #: [id number]    NSAID Medications Failed2/11 2:43 PM   Patient is age 6-64 years    Normal CBC on file in past 12 months    Blood pressure under 140/90 in past 12 months    Normal ALT on file in past 12 months    Normal AST on file in past 12 months    Recent (12 mo) or future (30 days) visit within the authorizing provider's specialty    Medication is active on med list    No active pregnancy on record    Normal serum creatinine on file in past 12 months    No positive pregnancy test in past 12 months        Debra Salomon RN   [facility]

## 2019-02-13 NOTE — TELEPHONE ENCOUNTER
Refilled per protocol.    Debra Salomon RN   [facility]

## 2019-03-13 DIAGNOSIS — M50.30 DDD (DEGENERATIVE DISC DISEASE), CERVICAL: ICD-10-CM

## 2019-03-13 DIAGNOSIS — M51.369 DDD (DEGENERATIVE DISC DISEASE), LUMBAR: ICD-10-CM

## 2019-03-13 NOTE — TELEPHONE ENCOUNTER
LOV- 1/17 says to return in 6 months. Prescription approved per refill protocol.    Luzma Malik RN

## 2019-04-16 DIAGNOSIS — M54.16 LUMBAR RADICULOPATHY: ICD-10-CM

## 2019-04-16 RX ORDER — IBUPROFEN 600 MG/1
TABLET, FILM COATED ORAL
Qty: 60 TABLET | Refills: 0 | Status: SHIPPED | OUTPATIENT
Start: 2019-04-16 | End: 2019-05-11

## 2019-04-16 NOTE — TELEPHONE ENCOUNTER
Routing refill request to provider for review/approval because:  Patient falls outside protocol age parameter  Needs updated labs: CBC    Debra Salomon RN   [facility]

## 2019-04-24 DIAGNOSIS — M51.369 DDD (DEGENERATIVE DISC DISEASE), LUMBAR: ICD-10-CM

## 2019-04-24 DIAGNOSIS — M50.30 DDD (DEGENERATIVE DISC DISEASE), CERVICAL: ICD-10-CM

## 2019-04-24 NOTE — TELEPHONE ENCOUNTER
LOV- 1/17 says to f/u in 6 months. Prescription approved per refill protocol.    Luzma Malik RN

## 2019-04-25 DIAGNOSIS — M81.0 AGE-RELATED OSTEOPOROSIS WITHOUT CURRENT PATHOLOGICAL FRACTURE: ICD-10-CM

## 2019-04-25 RX ORDER — ALENDRONATE SODIUM 70 MG/1
TABLET ORAL
Qty: 4 TABLET | Refills: 0 | Status: SHIPPED | OUTPATIENT
Start: 2019-04-25 | End: 2019-05-18

## 2019-04-25 NOTE — LETTER
April 25, 2019      Rissa Acosta  [address]  [address]  [place] [address]              Dear Rissa,    We recently received a refill request from your pharmacy requesting a refill of : Fosamax.    A review of your chart indicates that your last office visit was on 1/17/19.  You are due for a Dexa scan. You can call [phone] to schedule. We have authorized one time 30 day refill of your medication to allow time for you to schedule.     Please call the clinic at [phone], or log in to your [employer] account at [email or web address] to schedule your appointment within that time frame so there is no delay in next month's refill.    Thank you for taking an active role in your healthcare.    Sincerely,      ESTHER Aguilar CNP    If you need assistance with your [employer] log in, please call [phone].

## 2019-04-26 NOTE — PROGRESS NOTES
HPI:  Patient complains of watering and intermittent itchy eyes. She does not have allergies. Artificial tears help with her symptomsShe has type 2 diabetes - controlled by diet and exercise. Her last HA1C was 6.7 in 01/147/2019. Her friend is here interpreting. They declined  service.       Pertinent Medical History:    Type 2 diabetes mellitus without complications    Hyperlipidemia    Positive PPD    Exposure to hepatitis B    Viral hepatitis    Ocular History:    Cataract both eyes    Dry Eye Syndrome    Eye Medications:    Artificial tears QID both eyes    Assessment and Plan:  1.   Dermatochalasis, both upper lids.     Visually significant and bothersome. See oculoplastics for consult - Michael Morgan/Johnie.     2.   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     Lower lids with poor apposition to globe. See oculoplastics.     3.   No diabetic retinopathy both eyes.     Controlled by diet and exercise - no medication.     Goal is to keep HA1C under 7.     4.   Cataract, both eyes.    Not visually significant. Monitor.     5.   Hyperopia, astigmatism, both eyes.     Glasses prescription dispensed.     Medical History:  Past Medical History:   Diagnosis Date     Closed fracture of lumbar vertebra (H) 2/25/2011     DDD (degenerative disc disease), cervical 9/27/2013     DDD (degenerative disc disease), lumbar 9/27/2013     Diabetes (H)      Hepatitis     was treated in the past but not sure what type     Spinal stenosis of lumbar region 6/24/2011     Vitamin D insufficiency 9/27/2013       Medications:  Current Outpatient Medications   Medication Sig Dispense Refill     alendronate (FOSAMAX) 70 MG tablet TAKE 1 TAB BY MOUTH EVERY 7 DAYS 1 HR BEFORE BREAKFAST, WITH 8 OZ OF WATER. STAY UPRIGHT FOR 30 MIN 4 tablet 0     ascorbic acid (VITAMIN C) 500 MG tablet Take by mouth daily       B Complex Vitamins (VITAMIN-B COMPLEX PO)        blood glucose (BETSY CONTOUR NEXT) test strip Use to  test blood sugar 2 times daily or as directed. 100 strip 3     blood glucose monitoring (BETSY MICROLET) lancets USE TO TEST 1 TO 2 TIMES DAILY, AND AS NEEDED 200 each 3     blood glucose monitoring (ONETOUCH VERIO) meter device kit USE TO TEST BLOOD SUGAR 2 TIMES DAILY OR AS DIRECTED. 1 kit 0     diclofenac (VOLTAREN) 1 % topical gel APPLY 4 GRAMS TOPICALLY TO THE AFFECETED AREA TWICE DAILY USING ENCLOSED DOSING CARD. *MAKE APPT* 100 g 2     ibuprofen (ADVIL/MOTRIN) 600 MG tablet TAKE ONE TABLET BY MOUTH EVERY 12 HOURS AS NEEDED FOR MODERATE PAIN 60 tablet 0     Multiple Vitamins-Minerals (CHOICEFUL MULTIVITAMIN PO)        Omega-3 Fatty Acids (FISH OIL CONCENTRATE PO)        ONETOUCH VERIO IQ test strip USE TO TEST BLOOD SUGARS 2 TIMES DAILY OR AS DIRECTED 100 strip 3     ORDER FOR DME Equipment being ordered: walker  Wheeled and seated if needed (Patient not taking: Reported on 1/17/2019) 1 each 0     ORDER FOR DME Equipment being ordered: TENS 1 Units 0     pravastatin (PRAVACHOL) 20 MG tablet Take 1 tablet (20 mg) by mouth daily 90 tablet 3

## 2019-04-29 ENCOUNTER — OFFICE VISIT (OUTPATIENT)
Dept: OPHTHALMOLOGY | Facility: CLINIC | Age: 69
End: 2019-04-29
Attending: OPHTHALMOLOGY
Payer: COMMERCIAL

## 2019-04-29 DIAGNOSIS — H52.03 HYPERMETROPIA OF BOTH EYES: ICD-10-CM

## 2019-04-29 DIAGNOSIS — H02.835 DERMATOCHALASIS OF UPPER AND LOWER EYELIDS OF BOTH EYES: ICD-10-CM

## 2019-04-29 DIAGNOSIS — H02.832 DERMATOCHALASIS OF UPPER AND LOWER EYELIDS OF BOTH EYES: ICD-10-CM

## 2019-04-29 DIAGNOSIS — H02.834 DERMATOCHALASIS OF UPPER AND LOWER EYELIDS OF BOTH EYES: ICD-10-CM

## 2019-04-29 DIAGNOSIS — H25.13 NUCLEAR SENILE CATARACT OF BOTH EYES: ICD-10-CM

## 2019-04-29 DIAGNOSIS — H02.831 DERMATOCHALASIS OF UPPER AND LOWER EYELIDS OF BOTH EYES: ICD-10-CM

## 2019-04-29 PROCEDURE — G0463 HOSPITAL OUTPT CLINIC VISIT: HCPCS

## 2019-04-29 PROCEDURE — 92134 CPTRZ OPH DX IMG PST SGM RTA: CPT | Mod: ZF | Performed by: OPTOMETRIST

## 2019-04-29 ASSESSMENT — EXTERNAL EXAM - LEFT EYE: OS_EXAM: NORMAL

## 2019-04-29 ASSESSMENT — EXTERNAL EXAM - RIGHT EYE: OD_EXAM: NORMAL

## 2019-04-29 ASSESSMENT — TONOMETRY
OD_IOP_MMHG: 15
OS_IOP_MMHG: 15
IOP_METHOD: APPLANATION

## 2019-04-29 ASSESSMENT — REFRACTION_WEARINGRX
OD_CYLINDER: +0.75
OS_CYLINDER: +0.50
OD_ADD: +2.75
OS_ADD: +2.75
OS_AXIS: 040
OD_SPHERE: +3.50
OS_SPHERE: +3.50
OD_AXIS: 160

## 2019-04-29 ASSESSMENT — REFRACTION_MANIFEST
OS_SPHERE: +3.50
OD_SPHERE: +2.25
OS_AXIS: 021
OD_ADD: +2.75
OD_CYLINDER: +0.50
OS_ADD: +2.75
OS_CYLINDER: +0.25
OD_AXIS: 166

## 2019-04-29 ASSESSMENT — VISUAL ACUITY
METHOD: SNELLEN - LINEAR
OS_CC: 20/20-2
OD_CC: 20/30

## 2019-04-29 ASSESSMENT — CONF VISUAL FIELD
OD_NORMAL: 1
OS_NORMAL: 1

## 2019-05-03 ENCOUNTER — PRE VISIT (OUTPATIENT)
Dept: OPHTHALMOLOGY | Facility: CLINIC | Age: 69
End: 2019-05-03

## 2019-05-03 NOTE — TELEPHONE ENCOUNTER
FUTURE VISIT INFORMATION      FUTURE VISIT INFORMATION:    Date: 5/20/19    Time: 7:30am    Location: [facility]   REFERRAL INFORMATION:    Referring provider:   Millie Carl    Referring providers clinic:  [facility]  Reason for visit/diagnosis  Dermatochalasis, both upper lids.   RECORDS REQUESTED FROM:       Clinic name Comments Records Status Imaging Status   [facility] OV/ referral 4/29/19 Millie Corado

## 2019-05-11 DIAGNOSIS — M54.16 LUMBAR RADICULOPATHY: ICD-10-CM

## 2019-05-13 RX ORDER — IBUPROFEN 600 MG/1
TABLET, FILM COATED ORAL
Qty: 60 TABLET | Refills: 1 | Status: SHIPPED | OUTPATIENT
Start: 2019-05-13 | End: 2019-06-08

## 2019-05-13 NOTE — TELEPHONE ENCOUNTER
Last Comprehensive Metabolic Panel:  Sodium   Date Value Ref Range Status   01/17/2019 143 133 - 144 mmol/L Final     Potassium   Date Value Ref Range Status   01/17/2019 4.7 3.4 - 5.3 mmol/L Final     Chloride   Date Value Ref Range Status   01/17/2019 109 94 - 109 mmol/L Final     Carbon Dioxide   Date Value Ref Range Status   01/17/2019 28 20 - 32 mmol/L Final     Anion Gap   Date Value Ref Range Status   01/17/2019 6 3 - 14 mmol/L Final     Glucose   Date Value Ref Range Status   01/17/2019 121 (H) 70 - 99 mg/dL Final     Comment:     Fasting specimen     Urea Nitrogen   Date Value Ref Range Status   01/17/2019 17 7 - 30 mg/dL Final     Creatinine   Date Value Ref Range Status   01/17/2019 0.73 0.52 - 1.04 mg/dL Final     GFR Estimate   Date Value Ref Range Status   01/17/2019 84 >60 mL/min/[1.73_m2] Final     Comment:     Non  GFR Calc  Starting 12/18/2018, serum creatinine based estimated GFR (eGFR) will be   calculated using the Chronic Kidney Disease Epidemiology Collaboration   (CKD-EPI) equation.       Calcium   Date Value Ref Range Status   01/17/2019 8.6 8.5 - 10.1 mg/dL Final     Refill completed.

## 2019-05-13 NOTE — TELEPHONE ENCOUNTER
Routing refill request to provider for review/approval because:  Labs not current: 10/2017    Patient is older than 65 years.    Ranjana Hernandez RN, [facility]

## 2019-05-18 DIAGNOSIS — M81.0 AGE-RELATED OSTEOPOROSIS WITHOUT CURRENT PATHOLOGICAL FRACTURE: ICD-10-CM

## 2019-05-20 ENCOUNTER — OFFICE VISIT (OUTPATIENT)
Dept: OPHTHALMOLOGY | Facility: CLINIC | Age: 69
End: 2019-05-20
Payer: COMMERCIAL

## 2019-05-20 VITALS — BODY MASS INDEX: 20.89 KG/M2 | WEIGHT: 130 LBS | HEIGHT: 66 IN

## 2019-05-20 DIAGNOSIS — H02.135 SENILE ECTROPION OF BOTH LOWER EYELIDS: ICD-10-CM

## 2019-05-20 DIAGNOSIS — H02.831 DERMATOCHALASIS OF BOTH UPPER EYELIDS: Primary | ICD-10-CM

## 2019-05-20 DIAGNOSIS — H02.132 SENILE ECTROPION OF BOTH LOWER EYELIDS: ICD-10-CM

## 2019-05-20 DIAGNOSIS — H02.834 DERMATOCHALASIS OF BOTH UPPER EYELIDS: Primary | ICD-10-CM

## 2019-05-20 ASSESSMENT — VISUAL ACUITY
METHOD: SNELLEN - LINEAR
OD_CC+: -2+
OD_CC: 20/30
OS_CC: 20/25
CORRECTION_TYPE: GLASSES

## 2019-05-20 ASSESSMENT — MARGIN REFLEX DISTANCE
OD_MRD1: 5
OS_MRD1: 4

## 2019-05-20 ASSESSMENT — TONOMETRY
IOP_METHOD: ICARE
OS_IOP_MMHG: 11
OD_IOP_MMHG: 10

## 2019-05-20 ASSESSMENT — EXTERNAL EXAM - RIGHT EYE: OD_EXAM: NORMAL

## 2019-05-20 ASSESSMENT — MIFFLIN-ST. JEOR: SCORE: 1126.43

## 2019-05-20 ASSESSMENT — EXTERNAL EXAM - LEFT EYE: OS_EXAM: NORMAL

## 2019-05-20 NOTE — TELEPHONE ENCOUNTER
A letter was sent on 4/25 as she is due for a dexa scan. It is ordered. Patient is not currently scheduled.  Luzma Malik RN

## 2019-05-20 NOTE — LETTER
2019         RE:  :  MRN: Rissa Acosta  1950  [id number]     Dear Dr. Nerissa Rosario,    Thank you for asking me to see your patient, Rissa Acosta, for an oculoplastic   consultation.  My assessment and plan are below.  For further details, please see my attached clinic note.    Assessment & Plan     Rissa Acosta is a 69 year old female with the following diagnoses:   1. Dermatochalasis of both upper eyelids    2. Senile ectropion of both lower eyelids            Bilateral lower eyelid ectropion, not symptomatic.    Dermatochalasis is not visually significant at this time. Discussed cosmetic surgery and patient would like to wait.     Pt will return to clinic in ~1 year, sooner for any interval worsening.          Again, thank you for allowing me to participate in the care of your patient.      Sincerely,    Travis Jett MD  Department of Ophthalmology and Visual Neurosciences  [facility]    CC: Millie Carl, OD  [address] [address]  VIA In Basket     Cherie Covington, APRN CNP  [address]  [place] [address]  VIA In Basket

## 2019-05-20 NOTE — NURSING NOTE
Chief Complaints and History of Present Illnesses   Patient presents with     Consult For     Dermatochalasis and ectropion     Chief Complaint(s) and History of Present Illness(es)     Consult For     Laterality: both eyes    Onset: gradual    Onset: years ago    Frequency: constantly    Timing: throughout the day    Course: stable    Associated symptoms: Negative for eye pain    Treatments tried: no treatments    Pain scale: 0/10    Comments: Dermatochalasis and ectropion              Comments     Having trouble with upper and lower lids per Dr Carl. Patient states vision has been stable since last eye exam, both eyes. Denies eye pain or irritation. Does not take eye drops.    Lashon Richardson COT 7:32 AM May 20, 2019

## 2019-05-20 NOTE — PROGRESS NOTES
Oculoplastic Clinic New Patient    Patient: Rissa Acosta MRN# [id number]   YOB: 1950 Age: 69 year old   Date of Visit: May 20, 2019    CC: Droopy eyelids obstructing vision.              HPI:     Chief Complaint(s) and History of Present Illness(es)     Consult For     Laterality: both eyes    Onset: gradual    Onset: years ago    Frequency: constantly    Timing: throughout the day    Course: stable    Associated symptoms: Negative for eye pain    Treatments tried: no treatments    Pain scale: 0/10    Comments: Dermatochalasis and ectropion              Comments     Having trouble with upper and lower lids per Dr Carl. Patient states   vision has been stable since last eye exam, both eyes. Denies eye pain or   irritation. Does not take eye drops.    Lashon Richardson COT 7:32 AM May 20, 2019                 Rissa Acosta is a 69 year old female who has noted gradual onset of droopy eyelids over the past years. The droopy eyelid is interfering with activities of daily living including driving, and reading. The patient denies double vision, variability of the eyelid position, or dry eye symptoms. S/p bilateral upper blepharoplasty 15-20 years ago in [facility].    EXAM:     MRD1: right eye 5 mm, left eye 4 mm   Dermatochalasis with excess skin touching eyelashes: NO  Brow ptosis with brow resting below superior orbital rim: NO      Assessment & Plan     Rissa Acosta is a 69 year old female with the following diagnoses:   1. Dermatochalasis of both upper eyelids    2. Senile ectropion of both lower eyelids            Bilateral lower eyelid ectropion, not symptomatic.    Dermatochalasis is not visually significant at this time.     Pt will return to clinic in ~1 year, sooner for any interval worsening.         Joni Merrill MD  Department of Ophthalmology - PGY2    Attending Physician Attestation:  Complete documentation of historical and exam elements from today's encounter can be found in the full encounter  summary report (not reduplicated in this progress note).  I personally obtained the chief complaint(s) and history of present illness.  I confirmed and edited as necessary the review of systems, past medical/surgical history, family history, social history, and examination findings as documented by others; and I examined the patient myself.  I personally reviewed the relevant tests, images, and reports as documented above.  I formulated and edited as necessary the assessment and plan and discussed the findings and management plan with the patient and family. I personally reviewed the ophthalmic test(s) associated with this encounter, agree with the interpretation(s) as documented by the resident/fellow, and have edited the corresponding report(s) as necessary.   -Travis Jett MD

## 2019-05-21 ENCOUNTER — PATIENT OUTREACH (OUTPATIENT)
Dept: GERIATRIC MEDICINE | Facility: CLINIC | Age: 69
End: 2019-05-21

## 2019-05-21 NOTE — TELEPHONE ENCOUNTER
Left voicemail on preferred line with the help of an .  Imaging scheduling line provided.    Cindy Early  Primary Care   [facility] [place]

## 2019-05-24 ENCOUNTER — PATIENT OUTREACH (OUTPATIENT)
Dept: GERIATRIC MEDICINE | Facility: CLINIC | Age: 69
End: 2019-05-24

## 2019-05-24 RX ORDER — ALENDRONATE SODIUM 70 MG/1
TABLET ORAL
Qty: 4 TABLET | Refills: 3 | Status: SHIPPED | OUTPATIENT
Start: 2019-05-24 | End: 2020-02-19

## 2019-05-24 NOTE — TELEPHONE ENCOUNTER
2nd attempt    Left voicemail on daughter's line (consent to communicate) to schedule Dexa in order to continue medications.  Imaging and clinic lines provided.    Routing back to clinic to review.    Cindy Early  Primary Care   [facility] [place]

## 2019-05-24 NOTE — TELEPHONE ENCOUNTER
Alendronate 70 mg tablet  Last Written Prescription Date:  4/25/2019  Last Fill Quantity: 4,  # refills: 0   Last office visit: 1/17/2019 with prescribing provider.  Future Office Visit:  N/A    Routing refill request to provider for review/approval because:  Ethel given x1 and patient did not schedule Dexa, please advise    Brandee Banerjee RN

## 2019-05-24 NOTE — PROGRESS NOTES
"[facility] Care Coordination Contact    I requested [facility],  with RAJENDRAANGIE, to make 3rd phone contact yesterday 5/23, for another  attempt.  She called clients #, [phone], and LM on VM, asking for a return call.  She also called both emergency contacts listed, ELEAZAR TRACY (relative)  [phone], and JACQUI CON (friend) [phone], [phone], and LM on both VM's, asking for a return call.  Cuauhtemoc Soriano RN   [facility]   [phone]    I called both emergency contacts today, and LM on both VM's, asking for a return call.  Both are listed as \"Speaks English\"  Cuauhtemoc Soriano RN   [facility]   [phone]        "

## 2019-05-24 NOTE — PROGRESS NOTES
"[facility] Care Coordination Contact    Per CC, mailed client an \"Unable to Contact\" letter.    Flor Peterson  Care Management Specialist  [facility]  [phone]      "

## 2019-05-24 NOTE — LETTER
May 24, 2019      CHARLI LIPSCOMB  [address], [address]  [place] [address]        Dear Charli:     Your Care Coordinator has been unable to reach you by telephone. I am writing to ask you or a family member to call me at [phone]. If you reach my voicemail, please leave a message with your daytime telephone number and a date and time that I can call you. If you are hearing impaired, please call the [place] Relay at 800 or [phone] (zzvywt-xw-hyvbjo relay service).     The reason I am trying to reach you is:    [] Six (6) month check-in  [x] To schedule your annual assessment  [] Other:      Please call me as soon as you receive this letter. I look forward to speaking with you.    Sincerely,    Cuauhtemoc Soriano RN    E-mail: [email or web address]  Phone: [phone]      [facility] ([facility]) is a health plan that contracts with both Medicare and the [place] Medical Assistance (Medicaid) program to provide benefits of both programs to enrollees. Enrollment in [facility] depends on contract renewal.    [id number])    [id number] (11/18)

## 2019-05-30 ENCOUNTER — PATIENT OUTREACH (OUTPATIENT)
Dept: GERIATRIC MEDICINE | Facility: CLINIC | Age: 69
End: 2019-05-30

## 2019-05-30 NOTE — PROGRESS NOTES
[facility] Care Coordination Contact    Cecilia Ruiz told me late yesterday that she had been contacted by clients friend Rigoberto Dhruvconnieuna that she would like a home visit.  She has moved to [place] in [place]. We set up 5/31/19 at 4:45 for the visit.  Cuauhtemoc Soriano RN   [facility]   [phone]

## 2019-05-31 NOTE — PROGRESS NOTES
[facility] Care Coordination Contact    Received VM from [facility] this evening, and she talked with the client and they scheduled her annual for next Friday, 6/7 at 4:45 PM.   Cuauhtemoc Soriano RN   [facility]   [phone]

## 2019-06-07 ENCOUNTER — PATIENT OUTREACH (OUTPATIENT)
Dept: GERIATRIC MEDICINE | Facility: CLINIC | Age: 69
End: 2019-06-07

## 2019-06-08 DIAGNOSIS — M54.16 LUMBAR RADICULOPATHY: ICD-10-CM

## 2019-06-10 ENCOUNTER — PATIENT OUTREACH (OUTPATIENT)
Dept: GERIATRIC MEDICINE | Facility: CLINIC | Age: 69
End: 2019-06-10

## 2019-06-10 RX ORDER — IBUPROFEN 600 MG/1
TABLET, FILM COATED ORAL
Qty: 60 TABLET | Refills: 0 | Status: SHIPPED | OUTPATIENT
Start: 2019-06-10 | End: 2019-07-12

## 2019-06-10 ASSESSMENT — ACTIVITIES OF DAILY LIVING (ADL): DEPENDENT_IADLS:: SHOPPING;TRANSPORTATION

## 2019-06-10 NOTE — PROGRESS NOTES
[facility] Care Coordination Contact    [facility] Home Visit Assessment     Home visit for Health Risk Assessment with Rissa Acosta completed on June 7, 2019    Type of residence:: Apartment - handicap accessible  Current living arrangement:: I live alone     Assessment completed with:: Patient,  Cecilia Ruiz from [facility]    Current Care Plan  Member currently receiving the following home care services:  None   Member currently receiving the following community resources:    [facility]    Medication Review  Medication reconciliation completed in Epic: Yes  Medication set-up & administration: Independent and sets up on own weekly.  Self-administers medications.  Medication Risk Assessment Medication (1 or more, place referral to MTM): N/A: No risk factors identified  MTM Referral Placed: No: No risk factors idenified    Mental/Behavioral Health   Depression Screening: See PHQ assessment flowsheet.   Mental health DX:: No      Mental Health Diagnosis: No  Mental Health Services: None: No further intervention needed at this time.    Falls Assessment:   Fallen 2 or more times in the past year?: No   Any fall with injury in the past year?: No    ADL/IADL Dependencies:   Dependent ADLs:: Independent  Dependent IADLs:: Shopping, Transportation    [facility] Plan sponsored benefits: Shared information re: Silver Sneakers/gym memberships, ASA, Calcium +D.    PCA Assessment completed at visit: No     Elderly Waiver Eligibility: No-does not meet criteria    Care Plan & Recommendations: See POC    See LTCC for detailed assessment information.    Follow-Up Plan: Member informed of future contact, plan to f/u with member with a 6 month telephone assessment.  Contact information shared with member and family, encouraged member to call with any questions or concerns at any time.    [place] care continuum providers: Please refer to Health Care Home on the [facility] Problem List to view this patient's [place]  [facility] Care Plan Summary.  Cuauhtemoc Soriano RN   [facility]   [phone]

## 2019-06-10 NOTE — TELEPHONE ENCOUNTER
Last Written Prescription Date:  5/13/19  Last Fill Quantity: 60,  # refills: 1   Last office visit: 1/17/2019 with prescribing provider.  Future Office Visit:   N/A    Due for diabetes follow up ~July 17, 2019.    Routing refill request to provider for review/approval because:   Lab work is not current:  Last CBC in 10/2017    NSAID Medications Failed6/8 9:40 AM   Patient is age 6-64 years    Normal CBC on file in past 12 months       Brandee Banerjee RN

## 2019-06-11 ENCOUNTER — PATIENT OUTREACH (OUTPATIENT)
Dept: GERIATRIC MEDICINE | Facility: CLINIC | Age: 69
End: 2019-06-11

## 2019-06-11 NOTE — PROGRESS NOTES
[facility] Care Coordination Contact    Received after visit chart from care coordinator.  Completed following tasks: Mailed copy of care plan to client  Chart was returned to CC.    and Provider Signature - No POC Shared:  Member indicates that they do not want their POC shared with any [facility] providers.     Flor Peterson  Care Management Specialist  [facility]  [phone]

## 2019-06-11 NOTE — LETTER
June 11, 2019    CHARLI LIPSCOMB  [address]  [place] [address]        Dear Charli:    At [facility], we are dedicated to improving your health and well-being. Enclosed is the Comprehensive Care Plan that we developed with you on 6/7/2019. Please review the Care Plan carefully.    As a reminder, some of the things we discussed at your visit include:    Your physical and mental health    Ways to reduce falls    Health care needs you may have    Don t forget to contact your care coordinator if you:    Have been hospitalized or plan to be hospitalized     Have had a fall     Have experienced a change in physical health    Are experiencing emotional problems     If you do not agree with your Care Plan, have questions about it, or have experienced a change in your needs, please call me at [phone]. If you are hearing impaired, please call the [place] Relay at [phone] or [phone] (nugrky-hc-egjqbi relay service).    Sincerely,        Cuauhtemoc Soriano RN    E-mail: [email or web address]  Phone: [phone]      [id number] [place] ([id number]     [id number] (11/18)

## 2019-07-12 DIAGNOSIS — M54.16 LUMBAR RADICULOPATHY: ICD-10-CM

## 2019-07-12 RX ORDER — IBUPROFEN 600 MG/1
TABLET, FILM COATED ORAL
Qty: 60 TABLET | Refills: 0 | Status: SHIPPED | OUTPATIENT
Start: 2019-07-12 | End: 2019-08-11

## 2019-07-12 NOTE — TELEPHONE ENCOUNTER
Route for Ibuprofen 600mg as NSAID Medications Failed6/8 9:40 AM    Patient is age 6-64 years     Normal CBC on file in past 12 months     Luzma Malik RN

## 2019-07-16 DIAGNOSIS — M51.369 DDD (DEGENERATIVE DISC DISEASE), LUMBAR: ICD-10-CM

## 2019-07-16 DIAGNOSIS — M50.30 DDD (DEGENERATIVE DISC DISEASE), CERVICAL: ICD-10-CM

## 2019-07-16 NOTE — LETTER
July 16, 2019      Rissa Acosta  [address] [place] [address]  [address]  [facility] [address]              Dear Rissa,    We recently received a refill request from your pharmacy requesting a refill of : Diclofenac.    A review of your chart indicates that your last office visit was on 1/17.  An appointment is required every 6 months with your provider. We have authorized a one time 30 day refill of your medication to allow time for you to schedule.     Please call the clinic at [phone], or log in to your [employer] account at [email or web address] to schedule your appointment within that time frame so there is no delay in next month's refill.    Thank you for taking an active role in your healthcare.    Sincerely,      ESTHER Aguilar CNP    If you need assistance with your [employer] log in, please call [phone].

## 2019-07-18 ENCOUNTER — TELEPHONE (OUTPATIENT)
Dept: PEDIATRICS | Facility: CLINIC | Age: 69
End: 2019-07-18

## 2019-07-18 DIAGNOSIS — E11.9 TYPE 2 DIABETES MELLITUS WITHOUT COMPLICATION, WITHOUT LONG-TERM CURRENT USE OF INSULIN (H): Primary | ICD-10-CM

## 2019-07-18 RX ORDER — LANCETS
EACH MISCELLANEOUS
Qty: 100 EACH | Refills: 11 | Status: SHIPPED | OUTPATIENT
Start: 2019-07-18 | End: 2020-09-12

## 2019-07-18 NOTE — TELEPHONE ENCOUNTER
Received note from pharmacy alternative requested:insurance now requires acc-chek guide. Please send in a new rx fro an accu-chek guide meter, accu-chek guide test strips and accu-chek fastclix lancets. Thank you.    CHRIS Holt

## 2019-08-11 DIAGNOSIS — M54.16 LUMBAR RADICULOPATHY: ICD-10-CM

## 2019-08-12 RX ORDER — IBUPROFEN 600 MG/1
TABLET, FILM COATED ORAL
Qty: 60 TABLET | Refills: 0 | Status: SHIPPED | OUTPATIENT
Start: 2019-08-12 | End: 2019-09-05

## 2019-08-12 NOTE — TELEPHONE ENCOUNTER
Routing refill request to provider for review/approval because:  Labs not current:  CBC and age is >64 years.  Luzma Malik RN

## 2019-09-05 DIAGNOSIS — M54.16 LUMBAR RADICULOPATHY: ICD-10-CM

## 2019-09-05 RX ORDER — IBUPROFEN 600 MG/1
TABLET, FILM COATED ORAL
Qty: 60 TABLET | Refills: 0 | Status: SHIPPED | OUTPATIENT
Start: 2019-09-05 | End: 2019-10-02

## 2019-10-02 DIAGNOSIS — M54.16 LUMBAR RADICULOPATHY: ICD-10-CM

## 2019-10-02 RX ORDER — IBUPROFEN 600 MG/1
TABLET, FILM COATED ORAL
Qty: 60 TABLET | Refills: 0 | Status: SHIPPED | OUTPATIENT
Start: 2019-10-02 | End: 2019-11-04

## 2019-10-02 NOTE — TELEPHONE ENCOUNTER
Routing Ibuprofen refill request to provider for review/approval because:  Age is greater than 64. Labs not current:  CBC  Luzma Malik RN

## 2019-11-07 DIAGNOSIS — M51.369 DDD (DEGENERATIVE DISC DISEASE), LUMBAR: ICD-10-CM

## 2019-11-07 DIAGNOSIS — M50.30 DDD (DEGENERATIVE DISC DISEASE), CERVICAL: ICD-10-CM

## 2019-11-26 ENCOUNTER — PATIENT OUTREACH (OUTPATIENT)
Dept: GERIATRIC MEDICINE | Facility: CLINIC | Age: 69
End: 2019-11-26

## 2019-11-26 NOTE — LETTER
November 26, 2019    RISSA LIPSCOMB  [address]  [address]  [place] [address]      Dear Rissa:    As a member of [facility] ([facility] you are provided a care coordinator. I will be your new care coordinator as of 12/01/2019. I will be calling you soon to see how you are doing and determine your needs.    If you have any questions, please feel free to call me at [phone]. If you reach my voice mail, please leave a message and your phone number. If you are hearing impaired, please call the [place] [employer] at 139 or [phone] (sojhso-hm-okyffd relay service).    I look forward to speaking with you soon.    Sincerely,        Faby Sears RN, MA    E-mail: [email or web address]  Phone: [phone]      [facility]          [facility]  [id number] DHS Approved ([id number])  [id number] (11/18)

## 2019-11-26 NOTE — PROGRESS NOTES
Internal CC change effective 12/01/19.  Mailed CC change letter to member.    Xin Sage  Care Management Specialist Manager  [facility]  [phone]

## 2019-12-03 DIAGNOSIS — M50.30 DDD (DEGENERATIVE DISC DISEASE), CERVICAL: ICD-10-CM

## 2019-12-03 DIAGNOSIS — M51.369 DDD (DEGENERATIVE DISC DISEASE), LUMBAR: ICD-10-CM

## 2020-01-03 ENCOUNTER — PATIENT OUTREACH (OUTPATIENT)
Dept: GERIATRIC MEDICINE | Facility: CLINIC | Age: 70
End: 2020-01-03

## 2020-01-03 NOTE — PROGRESS NOTES
[facility] Care Coordination Contact    Called member using Khmer  to complete six month assessment and left a message requesting a return call.  Faby Sears RN  [facility] Care Coordinator  [phone]

## 2020-01-07 DIAGNOSIS — E78.5 HYPERLIPIDEMIA LDL GOAL <100: ICD-10-CM

## 2020-01-07 RX ORDER — PRAVASTATIN SODIUM 20 MG
TABLET ORAL
Qty: 30 TABLET | Refills: 0 | Status: SHIPPED | OUTPATIENT
Start: 2020-01-07 | End: 2020-02-11

## 2020-01-07 NOTE — TELEPHONE ENCOUNTER
LOV- 1/17/19. Due for a physical with fasting labs, sent letter and kaitlynn x1.  Luzma Malik RN

## 2020-01-07 NOTE — LETTER
January 7, 2020      Rissa Acosta  [address] [place] [address]  [address]  [facility] [address]              Dear Rissa,      We recently received a refill request from your pharmacy requesting a refill of : Pravastatin.    A review of your chart indicates that your last office visit was on 1/17/19.  An appointment is required every year with your provider with fasting labs prior. We have authorized a one time 30 day refill of your medication to allow time for you to schedule.     Please call the clinic at [phone], or log in to your [employer] account at [email or web address] to schedule your appointment within that time frame so there is no delay in next month's refill.    Thank you for taking an active role in your healthcare.    Sincerely,        Cherie Covington CNP    If you need assistance with your [employer] log in, please call [phone].

## 2020-01-08 DIAGNOSIS — M51.369 DDD (DEGENERATIVE DISC DISEASE), LUMBAR: ICD-10-CM

## 2020-01-08 DIAGNOSIS — M50.30 DDD (DEGENERATIVE DISC DISEASE), CERVICAL: ICD-10-CM

## 2020-01-08 NOTE — TELEPHONE ENCOUNTER
LOV- 1/17. A letter was sent yesterday for a different medication. Ethel montiel.  Luzma Malik RN

## 2020-01-09 DIAGNOSIS — M51.369 DDD (DEGENERATIVE DISC DISEASE), LUMBAR: ICD-10-CM

## 2020-01-09 DIAGNOSIS — M50.30 DDD (DEGENERATIVE DISC DISEASE), CERVICAL: ICD-10-CM

## 2020-01-09 NOTE — TELEPHONE ENCOUNTER
Sent yesterday with receipt. LOV- 1/17/19- so due for a physical and a letter was sent on 1/6 for a different medication. Unable to refill until seen.   Luzma Malik RN

## 2020-01-17 NOTE — PROGRESS NOTES
[facility] Care Coordination Contact    Called member using Amharic  to complete six month assessment and left a message requesting a return call.

## 2020-02-10 ENCOUNTER — DOCUMENTATION ONLY (OUTPATIENT)
Dept: LAB | Facility: CLINIC | Age: 70
End: 2020-02-10

## 2020-02-10 DIAGNOSIS — Z00.00 ROUTINE GENERAL MEDICAL EXAMINATION AT A HEALTH CARE FACILITY: ICD-10-CM

## 2020-02-10 DIAGNOSIS — Z13.6 CARDIOVASCULAR SCREENING; LDL GOAL LESS THAN 160: Primary | ICD-10-CM

## 2020-02-10 DIAGNOSIS — E78.5 HYPERLIPIDEMIA LDL GOAL <100: ICD-10-CM

## 2020-02-10 DIAGNOSIS — E11.9 TYPE 2 DIABETES MELLITUS WITHOUT COMPLICATION, WITHOUT LONG-TERM CURRENT USE OF INSULIN (H): ICD-10-CM

## 2020-02-10 DIAGNOSIS — R79.89 ELEVATED LFTS: ICD-10-CM

## 2020-02-11 ENCOUNTER — OFFICE VISIT (OUTPATIENT)
Dept: PEDIATRICS | Facility: CLINIC | Age: 70
End: 2020-02-11
Payer: COMMERCIAL

## 2020-02-11 VITALS
TEMPERATURE: 97.7 F | HEART RATE: 68 BPM | HEIGHT: 61 IN | OXYGEN SATURATION: 99 % | SYSTOLIC BLOOD PRESSURE: 130 MMHG | WEIGHT: 140.2 LBS | DIASTOLIC BLOOD PRESSURE: 80 MMHG | BODY MASS INDEX: 26.47 KG/M2

## 2020-02-11 DIAGNOSIS — Z12.31 ENCOUNTER FOR SCREENING MAMMOGRAM FOR BREAST CANCER: ICD-10-CM

## 2020-02-11 DIAGNOSIS — Z00.00 ENCOUNTER FOR MEDICARE ANNUAL WELLNESS EXAM: Primary | ICD-10-CM

## 2020-02-11 DIAGNOSIS — E11.9 TYPE 2 DIABETES MELLITUS WITHOUT COMPLICATION, WITHOUT LONG-TERM CURRENT USE OF INSULIN (H): ICD-10-CM

## 2020-02-11 DIAGNOSIS — Z00.00 ROUTINE GENERAL MEDICAL EXAMINATION AT A HEALTH CARE FACILITY: ICD-10-CM

## 2020-02-11 DIAGNOSIS — E55.9 VITAMIN D INSUFFICIENCY: ICD-10-CM

## 2020-02-11 DIAGNOSIS — E78.5 HYPERLIPIDEMIA LDL GOAL <100: ICD-10-CM

## 2020-02-11 DIAGNOSIS — M51.369 DDD (DEGENERATIVE DISC DISEASE), LUMBAR: ICD-10-CM

## 2020-02-11 DIAGNOSIS — E28.39 MENOPAUSE OVARIAN FAILURE: ICD-10-CM

## 2020-02-11 DIAGNOSIS — M50.30 DDD (DEGENERATIVE DISC DISEASE), CERVICAL: ICD-10-CM

## 2020-02-11 DIAGNOSIS — Z13.6 CARDIOVASCULAR SCREENING; LDL GOAL LESS THAN 160: ICD-10-CM

## 2020-02-11 DIAGNOSIS — Z13.29 SCREENING FOR THYROID DISORDER: ICD-10-CM

## 2020-02-11 DIAGNOSIS — M54.16 LUMBAR RADICULAR PAIN: ICD-10-CM

## 2020-02-11 DIAGNOSIS — Z12.11 SCREEN FOR COLON CANCER: ICD-10-CM

## 2020-02-11 DIAGNOSIS — R79.89 ELEVATED LFTS: ICD-10-CM

## 2020-02-11 LAB
ALBUMIN SERPL-MCNC: 3.7 G/DL (ref 3.4–5)
ALP SERPL-CCNC: 116 U/L (ref 40–150)
ALT SERPL W P-5'-P-CCNC: 58 U/L (ref 0–50)
ANION GAP SERPL CALCULATED.3IONS-SCNC: 3 MMOL/L (ref 3–14)
AST SERPL W P-5'-P-CCNC: 40 U/L (ref 0–45)
BILIRUB SERPL-MCNC: 0.8 MG/DL (ref 0.2–1.3)
BUN SERPL-MCNC: 11 MG/DL (ref 7–30)
CALCIUM SERPL-MCNC: 9.1 MG/DL (ref 8.5–10.1)
CHLORIDE SERPL-SCNC: 109 MMOL/L (ref 94–109)
CHOLEST SERPL-MCNC: 166 MG/DL
CO2 SERPL-SCNC: 29 MMOL/L (ref 20–32)
CREAT SERPL-MCNC: 0.59 MG/DL (ref 0.52–1.04)
CREAT UR-MCNC: 89 MG/DL
DEPRECATED CALCIDIOL+CALCIFEROL SERPL-MC: 38 UG/L (ref 20–75)
GFR SERPL CREATININE-BSD FRML MDRD: >90 ML/MIN/{1.73_M2}
GLUCOSE SERPL-MCNC: 132 MG/DL (ref 70–99)
HBA1C MFR BLD: 6.8 % (ref 0–5.6)
HDLC SERPL-MCNC: 57 MG/DL
LDLC SERPL CALC-MCNC: 86 MG/DL
MICROALBUMIN UR-MCNC: 7 MG/L
MICROALBUMIN/CREAT UR: 7.41 MG/G CR (ref 0–25)
NONHDLC SERPL-MCNC: 109 MG/DL
POTASSIUM SERPL-SCNC: 4.5 MMOL/L (ref 3.4–5.3)
PROT SERPL-MCNC: 8.5 G/DL (ref 6.8–8.8)
SODIUM SERPL-SCNC: 141 MMOL/L (ref 133–144)
TRIGL SERPL-MCNC: 117 MG/DL
TSH SERPL DL<=0.005 MIU/L-ACNC: 0.6 MU/L (ref 0.4–4)

## 2020-02-11 PROCEDURE — 83036 HEMOGLOBIN GLYCOSYLATED A1C: CPT | Performed by: NURSE PRACTITIONER

## 2020-02-11 PROCEDURE — 82306 VITAMIN D 25 HYDROXY: CPT | Performed by: NURSE PRACTITIONER

## 2020-02-11 PROCEDURE — 84443 ASSAY THYROID STIM HORMONE: CPT | Performed by: NURSE PRACTITIONER

## 2020-02-11 PROCEDURE — G0439 PPPS, SUBSEQ VISIT: HCPCS | Performed by: NURSE PRACTITIONER

## 2020-02-11 PROCEDURE — 36415 COLL VENOUS BLD VENIPUNCTURE: CPT | Performed by: NURSE PRACTITIONER

## 2020-02-11 PROCEDURE — 82043 UR ALBUMIN QUANTITATIVE: CPT | Performed by: NURSE PRACTITIONER

## 2020-02-11 PROCEDURE — 80061 LIPID PANEL: CPT | Performed by: NURSE PRACTITIONER

## 2020-02-11 PROCEDURE — 80053 COMPREHEN METABOLIC PANEL: CPT | Performed by: NURSE PRACTITIONER

## 2020-02-11 RX ORDER — PRAVASTATIN SODIUM 20 MG
20 TABLET ORAL DAILY
Qty: 90 TABLET | Refills: 3 | Status: SHIPPED | OUTPATIENT
Start: 2020-02-11 | End: 2021-02-01

## 2020-02-11 ASSESSMENT — MIFFLIN-ST. JEOR: SCORE: 1088.32

## 2020-02-11 NOTE — PROGRESS NOTES
"  SUBJECTIVE:   Rissa Acosta is a 70 year old female who presents for Preventive Visit.    Are you in the first 12 months of your Medicare Part B coverage?  No    Physical Health:    In general, how would you rate your overall physical health? good    Outside of work, how many days during the week do you exercise? 6-7 days/week, walking    Outside of work, approximately how many minutes a day do you exercise?30-45 minutes    If you drink alcohol do you typically have >3 drinks per day or >7 drinks per week? No    Do you usually eat at least 4 servings of fruit and vegetables a day, include whole grains & fiber and avoid regularly eating high fat or \"junk\" foods? Yes    Do you have any problems taking medications regularly?  No    Do you have any side effects from medications? none    Needs assistance for the following daily activities: no assistance needed    Which of the following safety concerns are present in your home?  none identified     Hearing impairment: No    In the past 6 months, have you been bothered by leaking of urine? no    Mental Health:    In general, how would you rate your overall mental or emotional health? good  PHQ-2 Score:      Do you feel safe in your environment? Yes    Have you ever done Advance Care Planning? (For example, a Health Directive, POLST, or a discussion with a medical provider or your loved ones about your wishes): No, advance care planning information given to patient to review.  Patient declined advance care planning discussion at this time.    Additional concerns to address?  No    Fall risk:  Fallen 2 or more times in the past year?: No  Any fall with injury in the past year?: No    Cognitive Screening: Not appropriate due to language barrier    Do you have sleep apnea, excessive snoring or daytime drowsiness?: yes      Reviewed and updated as needed this visit by clinical staff  Tobacco  Allergies  Meds  Med Hx  Surg Hx  Fam Hx  Soc Hx        Reviewed and updated as " needed this visit by Provider        Social History     Tobacco Use     Smoking status: Never Smoker     Smokeless tobacco: Never Used   Substance Use Topics     Alcohol use: No                           Current providers sharing in care for this patient include:   Patient Care Team:  Cherie Covington APRN CNP as PCP - General (Family Practice)  Kalia Reddy MD as MD (Ophthalmology)  Mojgan Dave MD (Ophthalmology)  Millie Carl Chi, OD as MD (Optometry)  Faby Sears, RN as Lead Care Coordinator (Primary Care - CC)    The following health maintenance items are reviewed in Epic and correct as of today:  Health Maintenance   Topic Date Due     MEDICARE ANNUAL WELLNESS VISIT  01/17/2020     DIABETIC FOOT EXAM  01/17/2020     ZOSTER IMMUNIZATION (2 of 2) 04/07/2020     EYE EXAM  04/29/2020     A1C  08/11/2020     BMP  02/11/2021     LIPID  02/11/2021     MICROALBUMIN  02/11/2021     FALL RISK ASSESSMENT  02/11/2021     MAMMO SCREENING  02/18/2022     ADVANCE CARE PLANNING  02/11/2025     DTAP/TDAP/TD IMMUNIZATION (3 - Td) 12/10/2025     COLORECTAL CANCER SCREENING  03/03/2030     DEXA  Completed     HEPATITIS C SCREENING  Completed     PHQ-2  Completed     INFLUENZA VACCINE  Completed     PNEUMOCOCCAL IMMUNIZATION 65+ LOW/MEDIUM RISK  Completed     IPV IMMUNIZATION  Aged Out     MENINGITIS IMMUNIZATION  Aged Out     Lab work is in process  Labs reviewed in EPIC  BP Readings from Last 3 Encounters:   02/11/20 130/80   01/17/19 110/80   05/25/18 108/70    Wt Readings from Last 3 Encounters:   02/11/20 63.6 kg (140 lb 3.2 oz)   05/20/19 59 kg (130 lb)   01/17/19 62.2 kg (137 lb 1.6 oz)                  Patient Active Problem List   Diagnosis     CARDIOVASCULAR SCREENING; LDL GOAL LESS THAN 160     DDD (degenerative disc disease), cervical     DDD (degenerative disc disease), lumbar     Vitamin D insufficiency     Elevated LFTs     Positive PPD     Bilateral low back pain without sciatica     Lumbar  radiculopathy     Advanced directives, counseling/discussion     Closed fracture of lumbar vertebra (H)     Spinal stenosis of lumbar region     Viral hepatitis     Type 2 diabetes mellitus without complication, without long-term current use of insulin (H)     Osteopenia     Neuropathic pain, leg     Exposure to hepatitis B     Spinal stenosis of lumbar region without neurogenic claudication     Osteoporosis     Hyperlipidemia LDL goal <100     Health Care Home     Past Surgical History:   Procedure Laterality Date     BACK SURGERY  2011    [facility]     CATARACT IOL, RT/LT Right 2005       Social History     Tobacco Use     Smoking status: Never Smoker     Smokeless tobacco: Never Used   Substance Use Topics     Alcohol use: No     Family History   Problem Relation Age of Onset     Diabetes No family hx of      Macular Degeneration No family hx of      Glaucoma No family hx of      Strabismus No family hx of          Current Outpatient Medications   Medication Sig Dispense Refill     ascorbic acid (VITAMIN C) 500 MG tablet Take by mouth daily       B Complex Vitamins (VITAMIN-B COMPLEX PO)        blood glucose (BETSY CONTOUR NEXT) test strip Use to test blood sugar 2 times daily or as directed. 100 strip 3     blood glucose calibration (NO BRAND SPECIFIED) solution To accompany: Blood Glucose Monitor Brands: per insurance. 1 Bottle 3     blood glucose monitoring (BETSY MICROLET) lancets USE TO TEST 1 TO 2 TIMES DAILY, AND AS NEEDED 200 each 3     blood glucose monitoring (NO BRAND SPECIFIED) meter device kit Use to test blood sugar 1 to 2  times daily or as directed. Preferred blood glucose meter OR supplies to accompany: Blood Glucose Monitor Brands: per insurance. 1 kit 0     diclofenac (VOLTAREN) 1 % topical gel APPLY 4 GRAMS TOPICALLY TO THE AFFECETED AREA TWICE DAILY USING ENCLOSED DOSING CARD. *MAKE APPT* 100 g 0     ibuprofen (ADVIL/MOTRIN) 600 MG tablet TAKE ONE TABLET BY MOUTH EVERY 12 HOURS  AS NEEDED FOR MODERATE PAIN 60 tablet 3     Multiple Vitamins-Minerals (CHOICEFUL MULTIVITAMIN PO)        Omega-3 Fatty Acids (FISH OIL CONCENTRATE PO)        ORDER FOR DME Equipment being ordered: walker  Wheeled and seated if needed 1 each 0     ORDER FOR DME Equipment being ordered: TENS 1 Units 0     pravastatin (PRAVACHOL) 20 MG tablet Take 1 tablet (20 mg) by mouth daily 90 tablet 3     alendronate (FOSAMAX) 70 MG tablet TAKE 1 TAB BY MOUTH EVERY 7 DAYS 1 HR BEFORE BREAKFAST, WITH 8 OZ OF WATER. STAY UPRIGHT FOR 30 MIN (Patient not taking: Reported on 2/11/2020) 4 tablet 3     blood glucose (NO BRAND SPECIFIED) test strip Use to test blood sugar  1 to 2  times daily or as directed. To accompany: Blood Glucose Monitor Brands: per insurance. 100 strip 11     blood glucose monitoring (ONETOUCH VERIO) meter device kit USE TO TEST BLOOD SUGAR 2 TIMES DAILY OR AS DIRECTED. 1 kit 0     ONETOUCH VERIO IQ test strip USE TO TEST BLOOD SUGARS 2 TIMES DAILY OR AS DIRECTED 100 strip 3     thin (NO BRAND SPECIFIED) lancets Use with lanceting device. To accompany: Blood Glucose Monitor Brands: per insurance. 100 each 11     No Known Allergies  Recent Labs   Lab Test 02/11/20  0819 01/17/19  0911 05/25/18  0800 11/07/17  0735 10/13/17  1038   A1C 6.8* 6.7* 6.4*  --  6.1*   LDL 86 78  --  93 121*   HDL 57 61  --  53 59   TRIG 117 96  --  138 128   ALT 58* 44  --   --  51*   CR 0.59 0.73 0.75  --  0.69   GFRESTIMATED >90 84 76  --  84   GFRESTBLACK >90 >90 >90  --  >90   POTASSIUM 4.5 4.7 4.3  --  4.4   TSH  --  0.90  --   --  0.56      Pneumonia Vaccine:Up to date    Mammogram Screening: Mammogram Screening: Patient over age 50, mutual decision to screen reflected in health maintenance.    ROS:  CONSTITUTIONAL:NEGATIVE for fever, chills, change in weight  INTEGUMENTARY/SKIN: NEGATIVE for worrisome rashes, moles or lesions  EYES: NEGATIVE for vision changes or irritation  ENT: NEGATIVE for ear, mouth and throat  "problems  RESP:NEGATIVE for significant cough or SOB  BREAST: NEGATIVE for masses, tenderness or discharge  CV: NEGATIVE for chest pain, palpitations or peripheral edema  GI: NEGATIVE for nausea, abdominal pain, heartburn, or change in bowel habits   menopausal female: amenorrhea, no unusual urinary symptoms and no unusual vaginal symptoms  MUSCULOSKELETAL:POSITIVE  for back pain  and radicular pain  Right leg. No numbness  and NEGATIVE for joint swelling  and joint warmth   NEURO: NEGATIVE for weakness, dizziness or paresthesias  ENDOCRINE: NEGATIVE for temperature intolerance, skin/hair changes and POSITIVE  for HX diabetes  HEME/ALLERGY/IMMUNE: NEGATIVE for bleeding problems  PSYCHIATRIC: NEGATIVE for changes in mood or affect       OBJECTIVE:   /80 (BP Location: Right arm, Patient Position: Sitting, Cuff Size: Adult Regular)   Pulse 68   Temp 97.7  F (36.5  C) (Temporal)   Ht 1.549 m (5' 1\")   Wt 63.6 kg (140 lb 3.2 oz)   SpO2 99%   Breastfeeding No   BMI 26.49 kg/m   Estimated body mass index is 20.98 kg/m  as calculated from the following:    Height as of 5/20/19: 1.676 m (5' 6\").    Weight as of 5/20/19: 59 kg (130 lb).  EXAM:   GENERAL APPEARANCE: healthy, alert and no distress  EYES: Eyes grossly normal to inspection and conjunctivae and sclerae normal  HENT: ear canals and TM's normal, nose and mouth without ulcers or lesions, oropharynx clear and oral mucous membranes moist  NECK: no adenopathy, no asymmetry, masses, or scars and thyroid normal to palpation  RESP: lungs clear to auscultation - no rales, rhonchi or wheezes  BREAST: normal without masses, tenderness or nipple discharge and no palpable axillary masses or adenopathy  CV: regular rates and rhythm, no murmur, click or rub, no peripheral edema and peripheral pulses strong  ABDOMEN: soft, nontender, no hepatosplenomegaly, no masses and bowel sounds normal   (female): deferred  MS: no musculoskeletal defects are noted and gait is " age appropriate without ataxia  SKIN: no suspicious lesions or rashes  NEURO: Normal strength and tone, sensory exam grossly normal, mentation intact and speech normal  PSYCH: mentation appears normal and affect normal/bright    Diagnostic Test Results:  Labs reviewed in Epic  Results for orders placed or performed in visit on 02/11/20   Hemoglobin A1c     Status: Abnormal   Result Value Ref Range    Hemoglobin A1C 6.8 (H) 0 - 5.6 %   Comprehensive metabolic panel (BMP + Alb, Alk Phos, ALT, AST, Total. Bili, TP)     Status: Abnormal   Result Value Ref Range    Sodium 141 133 - 144 mmol/L    Potassium 4.5 3.4 - 5.3 mmol/L    Chloride 109 94 - 109 mmol/L    Carbon Dioxide 29 20 - 32 mmol/L    Anion Gap 3 3 - 14 mmol/L    Glucose 132 (H) 70 - 99 mg/dL    Urea Nitrogen 11 7 - 30 mg/dL    Creatinine 0.59 0.52 - 1.04 mg/dL    GFR Estimate >90 >60 mL/min/[1.73_m2]    GFR Estimate If Black >90 >60 mL/min/[1.73_m2]    Calcium 9.1 8.5 - 10.1 mg/dL    Bilirubin Total 0.8 0.2 - 1.3 mg/dL    Albumin 3.7 3.4 - 5.0 g/dL    Protein Total 8.5 6.8 - 8.8 g/dL    Alkaline Phosphatase 116 40 - 150 U/L    ALT 58 (H) 0 - 50 U/L    AST 40 0 - 45 U/L   Lipid panel reflex to direct LDL Fasting     Status: None   Result Value Ref Range    Cholesterol 166 <200 mg/dL    Triglycerides 117 <150 mg/dL    HDL Cholesterol 57 >49 mg/dL    LDL Cholesterol Calculated 86 <100 mg/dL    Non HDL Cholesterol 109 <130 mg/dL       ASSESSMENT / PLAN:   Rissa was seen today for wellness visit and physical.    Diagnoses and all orders for this visit:    Encounter for Medicare annual wellness exam  -     Vitamin D Deficiency    Routine general medical examination at a health care facility    Type 2 diabetes mellitus without complication, without long-term current use of insulin (H)  annual eye examinations at Ophthalmology discussed and glycohemoglobin monitoring discussed  No changes in current regimen  Attempt to improve diet  Weight loss advised   Recheck in 6  months, sooner should new symptoms or   problems arise.    Vitamin D insufficiency  -     Vitamin D Deficiency    Hyperlipidemia LDL goal <100  -     pravastatin (PRAVACHOL) 20 MG tablet; Take 1 tablet (20 mg) by mouth daily    Screening for thyroid disorder  -     TSH with free T4 reflex    DDD (degenerative disc disease), cervical  -     diclofenac (VOLTAREN) 1 % topical gel; APPLY 4 GRAMS TOPICALLY TO THE AFFECETED AREA TWICE DAILY USING ENCLOSED DOSING CARD. *MAKE APPT*    DDD (degenerative disc disease), lumbar  -     diclofenac (VOLTAREN) 1 % topical gel; APPLY 4 GRAMS TOPICALLY TO THE AFFECETED AREA TWICE DAILY USING ENCLOSED DOSING CARD. *MAKE APPT*    Encounter for screening mammogram for breast cancer  -     MA Screening Digital Bilateral; Future    Screen for colon cancer  -     GASTROENTEROLOGY ADULT REF PROCEDURE ONLY [facility] [phone]; No Provider Preference    Lumbar radicular pain  -     Further workup and treatment could be done if symptoms persist, worsen or new related symptoms occur. The patient will call in that eventuality.'    Menopause ovarian failure  -     DX Hip/Pelvis/Spine; Future    PLAN:   Symptomatic therapy suggested: Continue current medication regimen unchanged.  Need to check on shingles vaccine    Patient needs to follow up in if no improvement,or sooner if worsening of symptoms or other symptoms develop.  FURTHER TESTING:       - DXA (bone density) scan       - mammogram  CONSULTATION/REFERRAL to colonoscopy   Follow up in 6 months.  Follow up office visit in one year for annual health maintenance exam, sooner PRN.  COUNSELING:  Reviewed preventive health counseling, as reflected in patient instructions  Special attention given to:       Regular exercise       Healthy diet/nutrition       Vision screening       Immunizations    : Zoster due to Other will get at pharmacy                Osteoporosis Prevention/Bone Health       Colon cancer screening       Hepatitis C  "screening       HIV screening for high risk patient       The 10-year ASCVD risk score (Jeannie EDWARDS Jr., et al., 2013) is: 19.2%    Values used to calculate the score:      Age: 70 years      Sex: Female      Is Non- : No      Diabetic: Yes      Tobacco smoker: No      Systolic Blood Pressure: 131 mmHg      Is BP treated: No      HDL Cholesterol: 57 mg/dL      Total Cholesterol: 166 mg/dL       Advanced Planning     Estimated body mass index is 20.98 kg/m  as calculated from the following:    Height as of 5/20/19: 1.676 m (5' 6\").    Weight as of 5/20/19: 59 kg (130 lb).    Weight management plan noted, stable and monitoring     reports that she has never smoked. She has never used smokeless tobacco.      Appropriate preventive services were discussed with this patient, including applicable screening as appropriate for cardiovascular disease, diabetes, osteopenia/osteoporosis, and glaucoma.  As appropriate for age/gender, discussed screening for colorectal cancer, prostate cancer, breast cancer, and cervical cancer. Checklist reviewing preventive services available has been given to the patient.    Reviewed patients plan of care and provided an AVS. The Intermediate Care Plan ( asthma action plan, low back pain action plan, and migraine action plan) for Rissa meets the Care Plan requirement. This Care Plan has been established and reviewed with the Patient.    Counseling Resources:  ATP IV Guidelines  Pooled Cohorts Equation Calculator  Breast Cancer Risk Calculator  FRAX Risk Assessment  ICSI Preventive Guidelines  Dietary Guidelines for Americans, 2010  USDA's MyPlate  ASA Prophylaxis  Lung CA Screening    CAMILO Benavides [facility]  "

## 2020-02-11 NOTE — NURSING NOTE
"Chief Complaint   Patient presents with     Wellness Visit     Physical       Initial /80 (BP Location: Right arm, Patient Position: Sitting, Cuff Size: Adult Regular)   Pulse 68   Temp 97.7  F (36.5  C) (Temporal)   Ht 1.549 m (5' 1\")   Wt 63.6 kg (140 lb 3.2 oz)   SpO2 99%   Breastfeeding No   BMI 26.49 kg/m   Estimated body mass index is 26.49 kg/m  as calculated from the following:    Height as of this encounter: 1.549 m (5' 1\").    Weight as of this encounter: 63.6 kg (140 lb 3.2 oz).  Medication Reconciliation: complete      CHRIS Holt      "

## 2020-02-11 NOTE — PATIENT INSTRUCTIONS
PLAN:   1.   Symptomatic therapy suggested: Continue current medication regimen unchanged.  Need to check on shingles vaccine   2.  Orders Placed This Encounter   Medications     pravastatin (PRAVACHOL) 20 MG tablet     Sig: Take 1 tablet (20 mg) by mouth daily     Dispense:  90 tablet     Refill:  3     diclofenac (VOLTAREN) 1 % topical gel     Sig: APPLY 4 GRAMS TOPICALLY TO THE AFFECETED AREA TWICE DAILY USING ENCLOSED DOSING CARD. *MAKE APPT*     Dispense:  100 g     Refill:  0     Orders Placed This Encounter   Procedures     MA Screening Digital Bilateral     DX Hip/Pelvis/Spine     TSH with free T4 reflex     Vitamin D Deficiency     GASTROENTEROLOGY ADULT REF PROCEDURE ONLY [facility] [phone]; No Provider Preference       3. Patient needs to follow up in if no improvement,or sooner if worsening of symptoms or other symptoms develop.  FURTHER TESTING:       - DXA (bone density) scan       - mammogram  CONSULTATION/REFERRAL to colonoscopy   Follow up in 6 months.  Follow up office visit in one year for annual health maintenance exam, sooner PRN.    Patient Education   Personalized Prevention Plan  You are due for the preventive services outlined below.  Your care team is available to assist you in scheduling these services.  If you have already completed any of these items, please share that information with your care team to update in your medical record.  Health Maintenance Due   Topic Date Due     Zoster (Shingles) Vaccine (1 of 2) 02/10/1999     A1C Lab  07/17/2019     PHQ-2  01/01/2020     Basic Metabolic Panel  01/17/2020     Cholesterol Lab  01/17/2020     Kidney Microalbumin Urine Test  01/17/2020     Diabetic Foot Exam  01/17/2020     Colonoscopy  01/08/2020     Annual Wellness Visit  01/17/2020

## 2020-02-12 NOTE — RESULT ENCOUNTER NOTE
Kishor Acosta,    Attached are your test results.  -Cholesterol levels are at your goal levels.  ADVISE: continuing your medication, a regular exercise program with at least 150 minutes of aerobic exercise per week, and eating a low saturated fat/low carbohydrate diet.  Also, you should recheck this fasting cholesterol panel in 12 months.  -Liver and gallbladder tests (ALT,AST, Alk phos,bilirubin) are normal.  -Kidney function (GFR) is normal.  -Sodium is normal.  -Potassium is normal.  -Calcium is normal.  -Glucose is elevated due to your diabetes.  -A1C (test of diabetes control the last 2-3 months) is at your goal. Please continue with your current plan. Also, you should make an appointment to see me and recheck your A1C test in 6 months.   -Microalbumin (urine protein) test is normal.  ADVISE: rechecking this annually.   Please contact us if you have any questions.    Cherie Covington, CNP

## 2020-02-12 NOTE — RESULT ENCOUNTER NOTE
Kishor Acosta,    Attached are your test results.  -TSH (thyroid stimulating hormone) level is normal which indicates normal thyroid function.  -Vitamin D level is normal and getting 1000 IU daily in your diet or supplements is recommended.    Please contact us if you have any questions.    Cherie Covington, CNP

## 2020-02-18 ENCOUNTER — TELEPHONE (OUTPATIENT)
Dept: PEDIATRICS | Facility: CLINIC | Age: 70
End: 2020-02-18

## 2020-02-18 ENCOUNTER — ANCILLARY PROCEDURE (OUTPATIENT)
Dept: BONE DENSITY | Facility: CLINIC | Age: 70
End: 2020-02-18
Attending: NURSE PRACTITIONER
Payer: COMMERCIAL

## 2020-02-18 ENCOUNTER — ANCILLARY PROCEDURE (OUTPATIENT)
Dept: MAMMOGRAPHY | Facility: CLINIC | Age: 70
End: 2020-02-18
Attending: NURSE PRACTITIONER
Payer: COMMERCIAL

## 2020-02-18 DIAGNOSIS — M81.0 AGE-RELATED OSTEOPOROSIS WITHOUT CURRENT PATHOLOGICAL FRACTURE: Primary | ICD-10-CM

## 2020-02-18 DIAGNOSIS — E28.39 MENOPAUSE OVARIAN FAILURE: ICD-10-CM

## 2020-02-18 DIAGNOSIS — Z12.31 ENCOUNTER FOR SCREENING MAMMOGRAM FOR BREAST CANCER: ICD-10-CM

## 2020-02-18 PROCEDURE — 77080 DXA BONE DENSITY AXIAL: CPT | Performed by: RADIOLOGY

## 2020-02-18 PROCEDURE — 77067 SCR MAMMO BI INCL CAD: CPT

## 2020-02-18 NOTE — TELEPHONE ENCOUNTER
Notes recorded by Ranjana Hernandez RN on 2/18/2020 at 1:30 PM CST  Called patient's daughter with [employer] .  Left message with RN call back number.  Ranjana Hernandez RN, [facility]    ------    Notes recorded by Cherie Covington APRN CNP on 2/18/2020 at 1:02 PM CST  Please call  Rissa Acosta,    Attached are your test results.  How long has she in total taken the Fosamax I believe it has been 5 years   Bone density shows osteoporosis but has improved   You should be taking 7366-2925 mg of calcium with vit D.  You should be exercising regularly.   Please contact us if you have any questions.    Cherie Covington CNP

## 2020-02-18 NOTE — RESULT ENCOUNTER NOTE
Please call  Rissa Acosta,    Attached are your test results.  How long has she in total taken the Fosamax I believe it has been 5 years   Bone density shows osteoporosis but has improved   You should be taking 8875-2955 mg of calcium with vit D.  You should be exercising regularly.   Please contact us if you have any questions.    Cherie Covington, CNP

## 2020-02-19 NOTE — TELEPHONE ENCOUNTER
Received call from patient's friend, Rigoberto Purcell. States that [phone] is his number. Per chart review, there is no consent to communicate. Patient's friend is understanding. Gave patient's phone number: [phone]. Demographics will be updated to reflect this.    Called patient with [employer] . Relayed results and recommendations as stated below by Liane Covington CNP. Patient verbalized understanding. States she has been taking Fosamax for about 5 years. She would like prescription for calcium with vitamin D. Preferred pharmacy is the [facility] in [place], [place]. Advised patient I will route this request to Liane Covington CNP. Patient requests we call [phone] (Rigobertojud Purcell) in the future. Explained to patient we will need to have her fill out a consent to communicate form at her next visit and include Rigoberto's name. Patient gave verbal consent for us to call Rigoberto Purcell regarding the calcium with vitamin D prescription.    Ximena Bae, RN, BSN  [facility]

## 2020-02-20 RX ORDER — LIDOCAINE 40 MG/G
CREAM TOPICAL
Status: CANCELLED | OUTPATIENT
Start: 2020-02-20

## 2020-02-20 RX ORDER — ONDANSETRON 2 MG/ML
4 INJECTION INTRAMUSCULAR; INTRAVENOUS
Status: CANCELLED | OUTPATIENT
Start: 2020-02-20

## 2020-02-28 ENCOUNTER — APPOINTMENT (OUTPATIENT)
Dept: INTERPRETER SERVICES | Facility: CLINIC | Age: 70
End: 2020-02-28
Payer: COMMERCIAL

## 2020-03-01 ENCOUNTER — HEALTH MAINTENANCE LETTER (OUTPATIENT)
Age: 70
End: 2020-03-01

## 2020-03-03 ENCOUNTER — SURGERY (OUTPATIENT)
Age: 70
End: 2020-03-03
Payer: COMMERCIAL

## 2020-03-03 ENCOUNTER — HOSPITAL ENCOUNTER (OUTPATIENT)
Facility: AMBULATORY SURGERY CENTER | Age: 70
Discharge: HOME | End: 2020-03-03
Attending: INTERNAL MEDICINE | Admitting: INTERNAL MEDICINE
Payer: COMMERCIAL

## 2020-03-03 VITALS
DIASTOLIC BLOOD PRESSURE: 83 MMHG | HEART RATE: 66 BPM | OXYGEN SATURATION: 99 % | SYSTOLIC BLOOD PRESSURE: 131 MMHG | RESPIRATION RATE: 16 BRPM | TEMPERATURE: 96.9 F

## 2020-03-03 LAB — COLONOSCOPY: NORMAL

## 2020-03-03 PROCEDURE — G8907 PT DOC NO EVENTS ON DISCHARG: HCPCS

## 2020-03-03 PROCEDURE — 45380 COLONOSCOPY AND BIOPSY: CPT | Mod: 33 | Performed by: INTERNAL MEDICINE

## 2020-03-03 PROCEDURE — 88305 TISSUE EXAM BY PATHOLOGIST: CPT | Performed by: INTERNAL MEDICINE

## 2020-03-03 PROCEDURE — 45380 COLONOSCOPY AND BIOPSY: CPT

## 2020-03-03 PROCEDURE — G8918 PT W/O PREOP ORDER IV AB PRO: HCPCS

## 2020-03-03 RX ORDER — ONDANSETRON 4 MG/1
4 TABLET, ORALLY DISINTEGRATING ORAL EVERY 6 HOURS PRN
Status: DISCONTINUED | OUTPATIENT
Start: 2020-03-03 | End: 2020-03-04 | Stop reason: HOSPADM

## 2020-03-03 RX ORDER — NALOXONE HYDROCHLORIDE 0.4 MG/ML
.1-.4 INJECTION, SOLUTION INTRAMUSCULAR; INTRAVENOUS; SUBCUTANEOUS
Status: DISCONTINUED | OUTPATIENT
Start: 2020-03-03 | End: 2020-03-04 | Stop reason: HOSPADM

## 2020-03-03 RX ORDER — ONDANSETRON 2 MG/ML
4 INJECTION INTRAMUSCULAR; INTRAVENOUS EVERY 6 HOURS PRN
Status: DISCONTINUED | OUTPATIENT
Start: 2020-03-03 | End: 2020-03-04 | Stop reason: HOSPADM

## 2020-03-03 RX ORDER — LIDOCAINE 40 MG/G
CREAM TOPICAL
Status: DISCONTINUED | OUTPATIENT
Start: 2020-03-03 | End: 2020-03-04 | Stop reason: HOSPADM

## 2020-03-03 RX ORDER — FENTANYL CITRATE 50 UG/ML
INJECTION, SOLUTION INTRAMUSCULAR; INTRAVENOUS PRN
Status: DISCONTINUED | OUTPATIENT
Start: 2020-03-03 | End: 2020-03-03 | Stop reason: HOSPADM

## 2020-03-03 RX ORDER — ONDANSETRON 2 MG/ML
4 INJECTION INTRAMUSCULAR; INTRAVENOUS
Status: DISCONTINUED | OUTPATIENT
Start: 2020-03-03 | End: 2020-03-04 | Stop reason: HOSPADM

## 2020-03-03 RX ORDER — FLUMAZENIL 0.1 MG/ML
0.2 INJECTION, SOLUTION INTRAVENOUS
Status: SHIPPED | OUTPATIENT
Start: 2020-03-03 | End: 2020-03-03

## 2020-03-03 RX ADMIN — FENTANYL CITRATE 50 MCG: 50 INJECTION, SOLUTION INTRAMUSCULAR; INTRAVENOUS at 10:19

## 2020-03-03 RX ADMIN — FENTANYL CITRATE 25 MCG: 50 INJECTION, SOLUTION INTRAMUSCULAR; INTRAVENOUS at 10:28

## 2020-03-05 LAB — COPATH REPORT: NORMAL

## 2020-03-07 DIAGNOSIS — M54.16 LUMBAR RADICULOPATHY: ICD-10-CM

## 2020-03-09 RX ORDER — IBUPROFEN 600 MG/1
TABLET, FILM COATED ORAL
Qty: 180 TABLET | Refills: 1 | Status: SHIPPED | OUTPATIENT
Start: 2020-03-09 | End: 2020-08-20

## 2020-03-24 DIAGNOSIS — M51.369 DDD (DEGENERATIVE DISC DISEASE), LUMBAR: ICD-10-CM

## 2020-03-24 DIAGNOSIS — M50.30 DDD (DEGENERATIVE DISC DISEASE), CERVICAL: ICD-10-CM

## 2020-05-15 ENCOUNTER — PATIENT OUTREACH (OUTPATIENT)
Dept: GERIATRIC MEDICINE | Facility: CLINIC | Age: 70
End: 2020-05-15

## 2020-05-15 NOTE — PROGRESS NOTES
[facility] Care Coordination Contact    Called adult daughter Mojgan to schedule annual HRA home visit via telephone call. Mojgan will contact member to schedule a date and time for the assessment and call CC back with this information.  Faby Sears RN  [facility] Care Coordinator  [phone]

## 2020-05-19 DIAGNOSIS — M51.369 DDD (DEGENERATIVE DISC DISEASE), LUMBAR: ICD-10-CM

## 2020-05-19 DIAGNOSIS — M50.30 DDD (DEGENERATIVE DISC DISEASE), CERVICAL: ICD-10-CM

## 2020-05-20 NOTE — PROGRESS NOTES
[facility] Care Coordination Contact    CC did not get a call back from member's daughter regarding telephone assessment. Message left with daughter requesting a call back with date and time for a telephone interview with member.  Faby Sears RN  [facility] Care Coordinator  [phone]

## 2020-05-21 ENCOUNTER — PATIENT OUTREACH (OUTPATIENT)
Dept: GERIATRIC MEDICINE | Facility: CLINIC | Age: 70
End: 2020-05-21

## 2020-05-21 NOTE — TELEPHONE ENCOUNTER
DICLOFENAC SODIUM 1% GEL    Last Written Prescription Date:  3/25/2020  Last Fill Quantity: 100,   # refills: 1  Last Office Visit : 2/11/2020  Future Office visit:  None  Routing refill request to provider for review/approval because:  On last prescription order, It mentions Pt needing to make office visit?  Does Pt need to follow up with Provider?  Or Ok to send order of medication with refills to pharm for Pt care?  Refer to Provider for review and refills per Providers orders.    Shauna Arrieta RN  Central Triage Red Flags/Med Refills

## 2020-05-21 NOTE — PROGRESS NOTES
[facility] Care Coordination Contact    CC left a message with member on the two phone numbers listed using Czech  to give her information regarding the Covid-19 testing being done in her apartment building on 5/22 through [facility]. No call back so CC called member's daughter, Mojgan to inform her of the opportunity for member to be tested. She states member has been staying home and has not had any symptoms. She will call member to give her this information and will call CC back if member wants testing. CC also asked Mojgan to talk with member about scheduling an annual assessment via telephone. CC did not receive a call back from Mojgan.  Faby Sears RN  [facility] Care Coordinator  [phone]

## 2020-05-21 NOTE — PROGRESS NOTES
[facility] Care Coordination Contact    CC left messages using Micronesian  on two phone numbers listed for member requesting a call back to schedule a telephone assessment.   Faby Sears RN  [facility] Care Coordinator  [phone]

## 2020-05-22 ENCOUNTER — PATIENT OUTREACH (OUTPATIENT)
Dept: GERIATRIC MEDICINE | Facility: CLINIC | Age: 70
End: 2020-05-22

## 2020-05-22 ASSESSMENT — ACTIVITIES OF DAILY LIVING (ADL): DEPENDENT_IADLS:: SHOPPING

## 2020-05-22 NOTE — PROGRESS NOTES
"[facility] Care Coordination Contact    Per CC, mailed client an \"Unable to Contact\" letter.  Nola Pereira  Case Management Specialist  [facility]  [phone]    "

## 2020-05-23 NOTE — PROGRESS NOTES
[facility] Care Coordination Contact    [facility] Home Visit Assessment     Telephone visit for Health Risk Assessment with Rissa Acosta completed on May 22, 2020 due Covid-19 home visit restrictions.     Type of residence:: Apartment - handicap accessible  Current living arrangement:: I live alone     Assessment completed with:: Patient    Current Care Plan  Member currently receiving the following home care services:   None  Member currently receiving the following community resources: None      Medication Review  Medication reconciliation completed in Epic: If no, please explain: Member's assessment done over the telephone and she was not able to review her medications.   Medication set-up & administration: Independent-does not set up.  Self-administers medications.  Medication Risk Assessment Medication (1 or more, place referral to MTM): N/A: No risk factors identified  MTM Referral Placed: No: No risk factors idenified    Mental/Behavioral Health   Depression Screening:   PHQ-2 Total Score (Adult) - Positive if 3 or more points; Administer PHQ-9 if positive: 0       Mental health DX:: No        Falls Assessment:   Fallen 2 or more times in the past year?: No   Any fall with injury in the past year?: No    ADL/IADL Dependencies:   Dependent ADLs:: Independent  Dependent IADLs:: Shopping    [facility] sponsored benefits: Shared information re: Silver Sneakers/gym memberships, ASA, Calcium +D.    PCA Assessment completed at visit: No     Elderly Waiver Eligibility: No-does not meet criteria    Care Plan & Recommendations: Member is independent. No services at this time.     See CC for detailed assessment information.    Follow-Up Plan: Member informed of future contact, plan to f/u with member with a 6 month telephone assessment.  Contact information shared with member and family, encouraged member to call with any questions or concerns at any time.    [place] care continuum providers:  Please refer to Health Care Home on the [facility] Problem List to view this patient's [facility] Care Plan Summary.  Faby Sears RN  [facility] Care Coordinator  [phone]

## 2020-06-05 ENCOUNTER — PATIENT OUTREACH (OUTPATIENT)
Dept: GERIATRIC MEDICINE | Facility: CLINIC | Age: 70
End: 2020-06-05

## 2020-06-05 NOTE — PROGRESS NOTES
[facility] [place] Care Coordination Contact    Received after visit chart from care coordinator.  Completed following tasks: Mailed copy of care plan to client   and Mailed POC sig page w/SASE.  Mailed HCD w/supporting information.  Nola Pereira  Case Management Specialist  [facility] [place]  [phone]

## 2020-06-05 NOTE — LETTER
June 5, 2020    CHARLI LIPSCOMB  [address]  [place] [address]        Dear Charli:    At [facility], we are dedicated to improving your health and well-being. Enclosed is the Comprehensive Care Plan that we developed with you on 5/22/2020. Please review the Care Plan carefully.    As a reminder, some of the things we discussed at your visit include:    Your physical and mental health    Ways to reduce falls    Health care needs you may have    Don t forget to contact your care coordinator if you:    Have been hospitalized or plan to be hospitalized     Have had a fall     Have experienced a change in physical health    Are experiencing emotional problems     If you do not agree with your Care Plan, have questions about it, or have experienced a change in your needs, please call me at [phone]. If you are hearing impaired, please call the [place] Relay at 361 or [phone] (qpkvkb-qa-mvnayw relay service).    Sincerely,        Faby Sears RN, MA    E-mail: [email or web address]  Phone: [phone]      [facility]+[id number] [place] ([id number])     [id number] (11/18)

## 2020-06-05 NOTE — LETTER
[facility] Advance Care Planning       Rissa Acosta  [address]  [place] [address]      Dear Rissa    You shared with me your interest in receiving information on Advance Care Planning and Health Care Directives. Discussing and making decisions about this part of our health is very important.  A Health Care Directive is a written document that outlines your goals, values, beliefs and choices for health care and medical treatment in the event you are unable to speak for yourself.     We greatly value the opportunity to assist you in documenting your choices and to honor your   wishes. We ve enclosed [place] Health Care Directive (short and long form) along with supporting information to help you get started thinking about your values and goals. We have several options for additional resources:       Health Care Directives and Advance Care Planning resources can be viewed and printed   for free at our web site:  [email or web address]."[email or web address]/choices.       Free group classes on Advance Care Planning and completing a Health Care Directive are available at multiple locations and times. These classes are led by trained staff who will provide information and guide you through a Health Care Directive.  They can also review, notarize and add your Health Care Directive to your medical record. [place] for a class at www."[employer].org/choices or by calling [employer] at [phone] or toll free [phone].      COPIES of completed Health Care Directives can be brought or mailed to any of our   locations, including the address listed below. You can also email a copy to [email or web address] .      Email or call me at the contact information listed below for questions, assistance, or to   make an appointment to discuss creating a Health Care Directive. You can also contact   our [employer] [employer] Department for questions or assistance.       Sincerely,     Faby Sears RN - [facility]  Coordinator    [phone]  Fax  [phone]  [email or web address]

## 2020-06-25 ENCOUNTER — TELEPHONE (OUTPATIENT)
Dept: PEDIATRICS | Facility: CLINIC | Age: 70
End: 2020-06-25

## 2020-06-25 DIAGNOSIS — E11.9 TYPE 2 DIABETES MELLITUS WITHOUT COMPLICATION, WITHOUT LONG-TERM CURRENT USE OF INSULIN (H): ICD-10-CM

## 2020-06-25 NOTE — TELEPHONE ENCOUNTER
Received note from pharmacy that they are missing illegible information on rx thin lancets. Please resend new script with specific testing frequency. How many times are they testing?     I do not seen anything recently ordered, could it be from the 7/2019 refill? Please advise.    CHRIS Alvarez

## 2020-07-16 DIAGNOSIS — M51.369 DDD (DEGENERATIVE DISC DISEASE), LUMBAR: ICD-10-CM

## 2020-07-16 DIAGNOSIS — M50.30 DDD (DEGENERATIVE DISC DISEASE), CERVICAL: ICD-10-CM

## 2020-07-23 DIAGNOSIS — E11.9 TYPE 2 DIABETES MELLITUS WITHOUT COMPLICATION, WITHOUT LONG-TERM CURRENT USE OF INSULIN (H): ICD-10-CM

## 2020-07-28 RX ORDER — BLOOD SUGAR DIAGNOSTIC
STRIP MISCELLANEOUS
Qty: 100 STRIP | OUTPATIENT
Start: 2020-07-28

## 2020-07-28 NOTE — TELEPHONE ENCOUNTER
ACCU-CHEK GUIDE TEST STRIP   Last Written Prescription Date:  1/10/2019  Last Fill Quantity: 100,   # refills: 3  Last Office Visit : 2/11/2020  Future Office visit:  None    Routing refill request to provider for review/approval because:  Brand new order for different brand of test strip.    Also would you like the Pt to test blood 1 time a day or 2 times a day for Pt care.     Please clarify and send Per Providers recommendations.   Thank you Provider.      Shauna Arrieta RN  Central Triage Red Flags/Med Refills

## 2020-08-16 DIAGNOSIS — M54.16 LUMBAR RADICULOPATHY: ICD-10-CM

## 2020-08-19 NOTE — TELEPHONE ENCOUNTER
ibuprofen (ADVIL/MOTRIN) 600 MG   Last Written Prescription Date:  3/9/20  Last Fill Quantity: 180,   # refills: 1  Last Office Visit : 2/11/20  Future Office visit:  NONE  Routing refill request to provider for review/approval because:  FAILED PROTOCOL  AGE    ABN / ALT   OVER DUE /  CBC

## 2020-08-20 RX ORDER — IBUPROFEN 600 MG/1
TABLET, FILM COATED ORAL
Qty: 180 TABLET | Refills: 1 | Status: SHIPPED | OUTPATIENT
Start: 2020-08-20 | End: 2021-10-21

## 2020-09-08 DIAGNOSIS — E11.9 TYPE 2 DIABETES MELLITUS WITHOUT COMPLICATION, WITHOUT LONG-TERM CURRENT USE OF INSULIN (H): ICD-10-CM

## 2020-09-12 RX ORDER — LANCETS
EACH MISCELLANEOUS
Qty: 200 EACH | Refills: 3 | Status: SHIPPED | OUTPATIENT
Start: 2020-09-12 | End: 2021-05-10

## 2020-09-12 NOTE — TELEPHONE ENCOUNTER
2/11/2020  [facility]   Cherie Covington APRN CNP   Family Practice     lancets

## 2020-12-13 ENCOUNTER — HEALTH MAINTENANCE LETTER (OUTPATIENT)
Age: 70
End: 2020-12-13

## 2021-01-31 DIAGNOSIS — E78.5 HYPERLIPIDEMIA LDL GOAL <100: ICD-10-CM

## 2021-02-01 RX ORDER — PRAVASTATIN SODIUM 20 MG
TABLET ORAL
Qty: 90 TABLET | Refills: 0 | Status: SHIPPED | OUTPATIENT
Start: 2021-02-01 | End: 2021-05-11

## 2021-02-01 NOTE — TELEPHONE ENCOUNTER
:;  Please notify patient that she is due now for fasting labwork and provider appointment for her hyperlipidemia.  1 refill given on the pravastatin.

## 2021-02-07 DIAGNOSIS — M81.0 AGE-RELATED OSTEOPOROSIS WITHOUT CURRENT PATHOLOGICAL FRACTURE: ICD-10-CM

## 2021-02-08 NOTE — TELEPHONE ENCOUNTER
Prescription approved per [facility] Refill Protocol.  APPT NEEDED FOR FURTHER REFILLS  Ethel refill given per RN protocol.   Due for office visit  Pharmacy note to inform pt of office visit needed for continued medication use  Aleyda Bauer RN

## 2021-04-06 ENCOUNTER — MYC MEDICAL ADVICE (OUTPATIENT)
Dept: FAMILY MEDICINE | Facility: CLINIC | Age: 71
End: 2021-04-06

## 2021-04-17 ENCOUNTER — HEALTH MAINTENANCE LETTER (OUTPATIENT)
Age: 71
End: 2021-04-17

## 2021-04-20 ENCOUNTER — PATIENT OUTREACH (OUTPATIENT)
Dept: GERIATRIC MEDICINE | Facility: CLINIC | Age: 71
End: 2021-04-20

## 2021-04-20 NOTE — PROGRESS NOTES
[facility] Care Coordination Contact    Called member to schedule annual HRA home visit. Left a message requesting a return call to schedule HRA. CC had Wolof  leave the message.  Faby Sears RN  [facility] Care Coordinator  [phone]

## 2021-04-21 NOTE — PROGRESS NOTES
[facility] Care Coordination Contact    CC received a call back from member's friend Rigoberto ([phone]) stating member received message. CC scheduled a telephone visit for HRA on 4/22 at 2:30. [place]  requested with CLI for the assessment.  Faby Sears RN  [facility] Care Coordinator  [phone]

## 2021-04-21 NOTE — PROGRESS NOTES
[facility] Care Coordination Contact    CC left a message with member again on 4/21 requesting a call back to set up an appointment for telephone assessment.  CC also left a message with member's daughter requesting assistance with reaching member to set up a telephone assessment.   Faby Sears RN  [facility] Care Coordinator  [phone]

## 2021-04-22 ENCOUNTER — PATIENT OUTREACH (OUTPATIENT)
Dept: GERIATRIC MEDICINE | Facility: CLINIC | Age: 71
End: 2021-04-22

## 2021-04-22 ASSESSMENT — ACTIVITIES OF DAILY LIVING (ADL): DEPENDENT_IADLS:: MONEY MANAGEMENT;SHOPPING;TRANSPORTATION

## 2021-04-23 NOTE — PROGRESS NOTES
[facility] Care Coordination Contact    [facility] Home Visit Assessment     Telephone visit for Health Risk Assessment with Rissa Acosta using professional [place]  Sofya completed on April 22, 2021 due to Covid-19 restrictions    Type of residence:: Apartment - handicap accessible  Current living arrangement:: I live alone     Assessment completed with:: Patient and [place] .    Current Care Plan  Member currently receiving the following home care services:   None  Member currently receiving the following community resources: None      Medication Review  Medication reconciliation completed in Epic: If no, please explain Unable to do over the telephone  Medication set-up & administration: Independent-does not set up.  Self-administers medications.  Medication Risk Assessment Medication (1 or more, place referral to MTM): N/A: No risk factors identified  MTM Referral Placed: No: No risk factors idenified    Mental/Behavioral Health   Depression Screening:   PHQ-2 Total Score (Adult) - Positive if 3 or more points; Administer PHQ-9 if positive: 2       Mental health DX:: No        Falls Assessment:   Fallen 2 or more times in the past year?: No   Any fall with injury in the past year?: No    ADL/IADL Dependencies:   Dependent ADLs:: Ambulation-walker  Dependent IADLs:: Money Management, Shopping, Transportation    [facility] sponsored benefits: Shared information re: Silver Sneakers/gym memberships, ASA, Calcium +D.    PCA Assessment completed at visit: Not Applicable     Elderly Waiver Eligibility: No-does not meet criteria    Care Plan & Recommendations: Member does not require services at this time. She states she is able to manage her ADL's and IADL's with minimal assistance. She has a friend Rigoberto who helps with paperwork and shopping. He also assists with transportation to appointments.     See [facility] for detailed assessment information.    Follow-Up Plan: Member  informed of future contact, plan to f/u with member with a 6 month telephone assessment.  Contact information shared with member and family, encouraged member to call with any questions or concerns at any time.    [place] care continuum providers: Please refer to Health Care Home on the Epic Problem List to view this patient's [facility] Care Plan Summary.  Faby Sears RN  [facility] Care Coordinator  [phone]

## 2021-04-27 ENCOUNTER — PATIENT OUTREACH (OUTPATIENT)
Dept: GERIATRIC MEDICINE | Facility: CLINIC | Age: 71
End: 2021-04-27

## 2021-04-27 NOTE — LETTER
April 27, 2021    CHARLI LIPSCOMB  [address]  [place] [address]        Dear Charli:    At [facility], we are dedicated to improving your health and well-being. Enclosed is the Comprehensive Care Plan that we developed with you on 4/22/2021. Please review the Care Plan carefully.    As a reminder, some of the things we discussed at your visit include:    Your physical and mental health    Ways to reduce falls    Health care needs you may have    Don t forget to contact your care coordinator if you:    Have been hospitalized or plan to be hospitalized     Have had a fall     Have experienced a change in physical health    Are experiencing emotional problems     If you do not agree with your Care Plan, have questions about it, or have experienced a change in your needs, please call me at [phone]. If you are hearing impaired, please call the [place] Relay at 073 or [phone] (ycyefq-vr-ksiojy relay service).    Sincerely,        Faby Sears RN, MA    E-mail: [email or web address]  Phone: [phone]      [facility]+[id number] [place] ([id number]     [id number] (11/18)

## 2021-04-27 NOTE — PROGRESS NOTES
[facility] Care Coordination Contact    Received after visit chart from care coordinator.  Completed following tasks: Mailed copy of care plan to client and mailed POC sig sheet s/SASE.   Nola Pereira  Case Management Specialist  [facility]  [phone]

## 2021-05-08 DIAGNOSIS — E11.9 TYPE 2 DIABETES MELLITUS WITHOUT COMPLICATION, WITHOUT LONG-TERM CURRENT USE OF INSULIN (H): ICD-10-CM

## 2021-05-10 RX ORDER — LANCETS
EACH MISCELLANEOUS
Qty: 200 EACH | Refills: 1 | Status: SHIPPED | OUTPATIENT
Start: 2021-05-10 | End: 2021-07-09

## 2021-05-11 ENCOUNTER — ANCILLARY PROCEDURE (OUTPATIENT)
Dept: GENERAL RADIOLOGY | Facility: CLINIC | Age: 71
End: 2021-05-11
Attending: NURSE PRACTITIONER
Payer: COMMERCIAL

## 2021-05-11 ENCOUNTER — APPOINTMENT (OUTPATIENT)
Dept: INTERPRETER SERVICES | Facility: CLINIC | Age: 71
End: 2021-05-11
Payer: COMMERCIAL

## 2021-05-11 ENCOUNTER — OFFICE VISIT (OUTPATIENT)
Dept: FAMILY MEDICINE | Facility: CLINIC | Age: 71
End: 2021-05-11
Payer: COMMERCIAL

## 2021-05-11 VITALS
HEIGHT: 61 IN | RESPIRATION RATE: 14 BRPM | OXYGEN SATURATION: 98 % | HEART RATE: 71 BPM | SYSTOLIC BLOOD PRESSURE: 130 MMHG | TEMPERATURE: 98.7 F | WEIGHT: 140 LBS | DIASTOLIC BLOOD PRESSURE: 74 MMHG | BODY MASS INDEX: 26.43 KG/M2

## 2021-05-11 DIAGNOSIS — M25.571 CHRONIC PAIN OF RIGHT ANKLE: ICD-10-CM

## 2021-05-11 DIAGNOSIS — Z12.31 ENCOUNTER FOR SCREENING MAMMOGRAM FOR BREAST CANCER: ICD-10-CM

## 2021-05-11 DIAGNOSIS — R25.2 MUSCLE CRAMPING: ICD-10-CM

## 2021-05-11 DIAGNOSIS — G89.29 CHRONIC PAIN OF RIGHT ANKLE: ICD-10-CM

## 2021-05-11 DIAGNOSIS — E11.9 TYPE 2 DIABETES MELLITUS WITHOUT COMPLICATION, WITHOUT LONG-TERM CURRENT USE OF INSULIN (H): ICD-10-CM

## 2021-05-11 DIAGNOSIS — E78.5 HYPERLIPIDEMIA LDL GOAL <100: ICD-10-CM

## 2021-05-11 DIAGNOSIS — Z00.00 ENCOUNTER FOR MEDICARE ANNUAL WELLNESS EXAM: Primary | ICD-10-CM

## 2021-05-11 LAB
ALBUMIN SERPL-MCNC: 3.7 G/DL (ref 3.4–5)
ALP SERPL-CCNC: 84 U/L (ref 40–150)
ALT SERPL W P-5'-P-CCNC: 40 U/L (ref 0–50)
ANION GAP SERPL CALCULATED.3IONS-SCNC: 5 MMOL/L (ref 3–14)
AST SERPL W P-5'-P-CCNC: 26 U/L (ref 0–45)
BILIRUB SERPL-MCNC: 0.6 MG/DL (ref 0.2–1.3)
BUN SERPL-MCNC: 15 MG/DL (ref 7–30)
CALCIUM SERPL-MCNC: 8.5 MG/DL (ref 8.5–10.1)
CHLORIDE SERPL-SCNC: 107 MMOL/L (ref 94–109)
CHOLEST SERPL-MCNC: 186 MG/DL
CO2 SERPL-SCNC: 27 MMOL/L (ref 20–32)
CREAT SERPL-MCNC: 0.67 MG/DL (ref 0.52–1.04)
CREAT UR-MCNC: 55 MG/DL
ERYTHROCYTE [DISTWIDTH] IN BLOOD BY AUTOMATED COUNT: 15.1 % (ref 10–15)
FERRITIN SERPL-MCNC: 265 NG/ML (ref 8–252)
GFR SERPL CREATININE-BSD FRML MDRD: 88 ML/MIN/{1.73_M2}
GLUCOSE SERPL-MCNC: 104 MG/DL (ref 70–99)
HBA1C MFR BLD: 6.9 % (ref 0–5.6)
HCT VFR BLD AUTO: 38.6 % (ref 35–47)
HDLC SERPL-MCNC: 60 MG/DL
HGB BLD-MCNC: 13.1 G/DL (ref 11.7–15.7)
LDLC SERPL CALC-MCNC: 109 MG/DL
MCH RBC QN AUTO: 27.1 PG (ref 26.5–33)
MCHC RBC AUTO-ENTMCNC: 33.9 G/DL (ref 31.5–36.5)
MCV RBC AUTO: 80 FL (ref 78–100)
MICROALBUMIN UR-MCNC: <5 MG/L
MICROALBUMIN/CREAT UR: NORMAL MG/G CR (ref 0–25)
NONHDLC SERPL-MCNC: 126 MG/DL
PLATELET # BLD AUTO: 229 10E9/L (ref 150–450)
POTASSIUM SERPL-SCNC: 4.1 MMOL/L (ref 3.4–5.3)
PROT SERPL-MCNC: 8.2 G/DL (ref 6.8–8.8)
RBC # BLD AUTO: 4.84 10E12/L (ref 3.8–5.2)
SODIUM SERPL-SCNC: 139 MMOL/L (ref 133–144)
TRIGL SERPL-MCNC: 84 MG/DL
WBC # BLD AUTO: 5.6 10E9/L (ref 4–11)

## 2021-05-11 PROCEDURE — 80061 LIPID PANEL: CPT | Performed by: NURSE PRACTITIONER

## 2021-05-11 PROCEDURE — 82728 ASSAY OF FERRITIN: CPT | Performed by: NURSE PRACTITIONER

## 2021-05-11 PROCEDURE — G0439 PPPS, SUBSEQ VISIT: HCPCS | Performed by: NURSE PRACTITIONER

## 2021-05-11 PROCEDURE — 85027 COMPLETE CBC AUTOMATED: CPT | Performed by: NURSE PRACTITIONER

## 2021-05-11 PROCEDURE — 73610 X-RAY EXAM OF ANKLE: CPT | Mod: RT | Performed by: RADIOLOGY

## 2021-05-11 PROCEDURE — 99214 OFFICE O/P EST MOD 30 MIN: CPT | Mod: 25 | Performed by: NURSE PRACTITIONER

## 2021-05-11 PROCEDURE — 82043 UR ALBUMIN QUANTITATIVE: CPT | Performed by: NURSE PRACTITIONER

## 2021-05-11 PROCEDURE — 36415 COLL VENOUS BLD VENIPUNCTURE: CPT | Performed by: NURSE PRACTITIONER

## 2021-05-11 PROCEDURE — 83036 HEMOGLOBIN GLYCOSYLATED A1C: CPT | Performed by: NURSE PRACTITIONER

## 2021-05-11 PROCEDURE — 80053 COMPREHEN METABOLIC PANEL: CPT | Performed by: NURSE PRACTITIONER

## 2021-05-11 RX ORDER — PRAVASTATIN SODIUM 20 MG
20 TABLET ORAL DAILY
Qty: 90 TABLET | Refills: 3 | Status: SHIPPED | OUTPATIENT
Start: 2021-05-11 | End: 2022-05-04

## 2021-05-11 ASSESSMENT — MIFFLIN-ST. JEOR: SCORE: 1074.48

## 2021-05-11 NOTE — RESULT ENCOUNTER NOTE
Kishor Acosta,    Attached are your test results.  -Normal red blood cell (hgb) levels, normal white blood cell count and normal platelet levels.   Please contact us if you have any questions.    Cherie Covington, CNP

## 2021-05-11 NOTE — RESULT ENCOUNTER NOTE
Kishor Acosta,    Attached are your test results.  -A1C (test of diabetes control the last 2-3 months) is at your goal. Please continue with your current plan. Also, you should make an appointment to see me and recheck your A1C test in 6 months.    Please contact us if you have any questions.    Cherie Covington, CNP

## 2021-05-11 NOTE — PATIENT INSTRUCTIONS
PLAN:   1.   Symptomatic therapy suggested: Continue current medications as prescribed.   2.  Orders Placed This Encounter   Medications     pravastatin (PRAVACHOL) 20 MG tablet     Sig: Take 1 tablet (20 mg) by mouth daily     Dispense:  90 tablet     Refill:  3     diclofenac (VOLTAREN) 1 % topical gel     Sig: Apply 2 g topically 4 times daily     Dispense:  350 g     Refill:  3     Orders Placed This Encounter   Procedures     *MA Screening Digital Bilateral     XR Ankle Right G/E 3 Views     Lipid panel reflex to direct LDL Fasting     Albumin Random Urine Quantitative with Creat Ratio     Comprehensive metabolic panel     Hemoglobin A1c       3. Patient needs to follow up in if no improvement,or sooner if worsening of symptoms or other symptoms develop.  FURTHER TESTING:       - mammogram  I will place order. Please call [phone] to schedule.  Will follow up and/or notify patient of  results via My Chart to determine further need for followup  Follow up office visit in one year for annual health maintenance exam, sooner PRN.    Patient Education   Personalized Prevention Plan  You are due for the preventive services outlined below.  Your care team is available to assist you in scheduling these services.  If you have already completed any of these items, please share that information with your care team to update in your medical record.  Health Maintenance Due   Topic Date Due     Diabetic Foot Exam  01/17/2020     Eye Exam  04/29/2020     A1C Lab  08/11/2020     Basic Metabolic Panel  02/11/2021     Cholesterol Lab  02/11/2021     Kidney Microalbumin Urine Test  02/11/2021

## 2021-05-11 NOTE — PROGRESS NOTES
"  SUBJECTIVE:   Rissa Acosta is a 71 year old female who presents for Preventive Visit.    Patient has been advised of split billing requirements and indicates understanding: Yes  Are you in the first 12 months of your Medicare Part B coverage?  No    Physical Health:    In general, how would you rate your overall physical health? fair    Outside of work, how many days during the week do you exercise? 6-7 days/week    Outside of work, approximately how many minutes a day do you exercise?30-45 minutes    If you drink alcohol do you typically have >3 drinks per day or >7 drinks per week? No    Do you usually eat at least 4 servings of fruit and vegetables a day, include whole grains & fiber and avoid regularly eating high fat or \"junk\" foods? Yes    Do you have any problems taking medications regularly?  No    Do you have any side effects from medications? none    Needs assistance for the following daily activities: no assistance needed    Which of the following safety concerns are present in your home?  none identified     Hearing impairment: No    In the past 6 months, have you been bothered by leaking of urine? no    Mental Health:    In general, how would you rate your overall mental or emotional health? good  PHQ-2 Score:      Do you feel safe in your environment? Yes    Have you ever done Advance Care Planning? (For example, a Health Directive, POLST, or a discussion with a medical provider or your loved ones about your wishes): No, advance care planning information given to patient to review.  Patient plans to discuss their wishes with loved ones or provider.      Additional concerns to address?  No    Fall risk:  Fallen 2 or more times in the past year?: No  Any fall with injury in the past year?: No    Cognitive Screenin) Repeat 3 items (Leader, Season, Table)    2) Clock draw: NORMAL  3) 3 item recall: Recalls 2 objects   Results: NORMAL clock, 1-2 items recalled: COGNITIVE IMPAIRMENT LESS " LIKELY    Mini-CogTM Copyright JEFF Mcelroy. Licensed by the author for use in [facility]; reprinted with permission (socarlota@[facility]). All rights reserved.      Do you have sleep apnea, excessive snoring or daytime drowsiness?: no    Reviewed and updated as needed this visit by clinical staff                 Reviewed and updated as needed this visit by Provider                Social History     Tobacco Use     Smoking status: Never Smoker     Smokeless tobacco: Never Used   Substance Use Topics     Alcohol use: No                           Current providers sharing in care for this patient include:   Patient Care Team:  Cherie Covington APRN CNP as PCP - General (Family Practice)  Kalia Reddy MD as MD (Ophthalmology)  Mojgan Dave MD (Ophthalmology)  Millie Carl Chi, OD as MD (Optometry)  Faby Sears RN as Lead Care Coordinator (Primary Care - CC)  Cherie Covington APRN CNP as Assigned PCP    The following health maintenance items are reviewed in Epic and correct as of today:  Health Maintenance   Topic Date Due     DIABETIC FOOT EXAM  01/17/2020     EYE EXAM  04/29/2020     A1C  08/11/2020     BMP  02/11/2021     LIPID  02/11/2021     MICROALBUMIN  02/11/2021     MAMMO SCREENING  02/18/2022     FALL RISK ASSESSMENT  04/22/2022     MEDICARE ANNUAL WELLNESS VISIT  05/11/2022     ADVANCE CARE PLANNING  03/08/2025     DTAP/TDAP/TD IMMUNIZATION (3 - Td) 12/10/2025     COLORECTAL CANCER SCREENING  03/03/2030     DEXA  02/18/2035     HEPATITIS C SCREENING  Completed     PHQ-2  Completed     INFLUENZA VACCINE  Completed     Pneumococcal Vaccine: Pediatrics (0 to 5 Years) and At-Risk Patients (6 to 64 Years)  Completed     Pneumococcal Vaccine: 65+ Years  Completed     ZOSTER IMMUNIZATION  Completed     COVID-19 Vaccine  Completed     IPV IMMUNIZATION  Aged Out     MENINGITIS IMMUNIZATION  Aged Out     Lab work is in process  Labs reviewed in EPIC  BP Readings from Last 3 Encounters:    05/11/21 130/74   03/03/20 131/83   02/11/20 130/80    Wt Readings from Last 3 Encounters:   05/11/21 63.5 kg (140 lb)   02/11/20 63.6 kg (140 lb 3.2 oz)   05/20/19 59 kg (130 lb)                  Patient Active Problem List   Diagnosis     CARDIOVASCULAR SCREENING; LDL GOAL LESS THAN 160     DDD (degenerative disc disease), cervical     DDD (degenerative disc disease), lumbar     Vitamin D insufficiency     Elevated LFTs     Positive PPD     Bilateral low back pain without sciatica     Lumbar radiculopathy     Advanced directives, counseling/discussion     Closed fracture of lumbar vertebra (H)     Spinal stenosis of lumbar region     Viral hepatitis     Type 2 diabetes mellitus without complication, without long-term current use of insulin (H)     Osteopenia     Neuropathic pain, leg     Exposure to hepatitis B     Spinal stenosis of lumbar region without neurogenic claudication     Osteoporosis     Hyperlipidemia LDL goal <100     Past Surgical History:   Procedure Laterality Date     BACK SURGERY  2011    [facility]     CATARACT IOL, RT/LT Right 2005     COLONOSCOPY N/A 3/3/2020    Procedure: Colonoscopy, With Polypectomy And Biopsy;  Surgeon: Arlet Zaragoza DO;  Location: [facility] OR     COLONOSCOPY WITH CO2 INSUFFLATION N/A 3/3/2020    Procedure: COLONOSCOPY, WITH CO2 INSUFFLATION;  Surgeon: Arlet Zaragoza DO;  Location: [facility] OR       Social History     Tobacco Use     Smoking status: Never Smoker     Smokeless tobacco: Never Used   Substance Use Topics     Alcohol use: No     Family History   Problem Relation Age of Onset     Diabetes No family hx of      Macular Degeneration No family hx of      Glaucoma No family hx of      Strabismus No family hx of          Current Outpatient Medications   Medication Sig Dispense Refill     ascorbic acid (VITAMIN C) 500 MG tablet Take by mouth daily       B Complex Vitamins (VITAMIN-B COMPLEX PO)        blood glucose (BETSY CONTOUR NEXT) test strip Use to  test blood sugar 2 times daily or as directed. 100 strip 3     blood glucose (NO BRAND SPECIFIED) test strip Use to test blood sugar 1 times daily or as directed. 100 strip 11     blood glucose (NO BRAND SPECIFIED) test strip Use to test blood sugar  1 to 2  times daily or as directed. To accompany: Blood Glucose Monitor Brands: per insurance. 100 strip 11     blood glucose calibration (NO BRAND SPECIFIED) solution To accompany: Blood Glucose Monitor Brands: per insurance. 1 Bottle 3     blood glucose monitoring (ACCU-CHEK FASTCLIX) lancets USE TO TEST 1 TO 2 TIMES DAILY, AND AS NEEDED. FILL WHAT IS COMPATIBLE W /DEVICE AND INS. 200 each 1     blood glucose monitoring (BETSY MICROLET) lancets USE TO TEST 1 TO 2 TIMES DAILY, AND AS NEEDED 200 each 3     blood glucose monitoring (NO BRAND SPECIFIED) meter device kit Use to test blood sugar 1 to 2  times daily or as directed. Preferred blood glucose meter OR supplies to accompany: Blood Glucose Monitor Brands: per insurance. 1 kit 0     blood glucose monitoring (ONETOUCH VERIO) meter device kit USE TO TEST BLOOD SUGAR 2 TIMES DAILY OR AS DIRECTED. 1 kit 0     calcium carb-cholecalciferol 600-500 MG-UNIT CAPS TAKE 1 CAPSULE BY MOUTH TWICE A DAY (NOT COVERED) 60 capsule 0     diclofenac (VOLTAREN) 1 % topical gel Apply 4 grams topically to the affected area twice daily using enclosed dosing card. 350 g 6     ibuprofen (ADVIL/MOTRIN) 600 MG tablet TAKE 1 TABLET BY MOUTH EVERY 12 HOURS AS NEEDED FOR MODERATE PAIN 180 tablet 1     Multiple Vitamins-Minerals (CHOICEFUL MULTIVITAMIN PO)        Omega-3 Fatty Acids (FISH OIL CONCENTRATE PO)        ONETOUCH VERIO IQ test strip USE TO TEST BLOOD SUGARS 2 TIMES DAILY OR AS DIRECTED 100 strip 3     ORDER FOR DME Equipment being ordered: walker  Wheeled and seated if needed 1 each 0     ORDER FOR DME Equipment being ordered: TENS 1 Units 0     pravastatin (PRAVACHOL) 20 MG tablet TAKE 1 TABLET BY MOUTH EVERY DAY 90 tablet 0     No  "Known Allergies  Pneumonia Vaccine: Up to date    Mammogram Screening: Mammogram Screening: Recommended mammography every 1-2 years with patient discussion and risk factor consideration    ROS:  CONSTITUTIONAL:NEGATIVE for fever, chills, change in weight  INTEGUMENTARY/SKIN: NEGATIVE for worrisome rashes, moles or lesions  EYES: NEGATIVE for vision changes or irritation  ENT: NEGATIVE for ear, mouth and throat problems  RESP:NEGATIVE for significant cough or SOB  BREAST: NEGATIVE for masses, tenderness or discharge  CV: NEGATIVE for chest pain, palpitations or peripheral edema  GI: NEGATIVE for nausea, abdominal pain, heartburn, or change in bowel habits   menopausal female: amenorrhea, no unusual urinary symptoms and no unusual vaginal symptoms  MUSCULOSKELETAL:POSITIVE  for back pain improved. Now is having ankle pain on the right. Has been bothering for a year. No injury that she knows of. Has cramps in back of calf  and NEGATIVE for joint swelling  and joint warmth   NEURO: NEGATIVE for weakness, dizziness or paresthesias  ENDOCRINE: NEGATIVE for temperature intolerance, skin/hair changes and POSITIVE  for HX diabetes  HEME/ALLERGY/IMMUNE: NEGATIVE for bleeding problems  PSYCHIATRIC: NEGATIVE for changes in mood or affect     OBJECTIVE:   /74   Pulse 71   Temp 98.7  F (37.1  C) (Tympanic)   Resp 14   Ht 1.537 m (5' 0.5\")   Wt 63.5 kg (140 lb)   SpO2 98%   BMI 26.89 kg/m   Estimated body mass index is 26.49 kg/m  as calculated from the following:    Height as of 2/11/20: 1.549 m (5' 1\").    Weight as of 2/11/20: 63.6 kg (140 lb 3.2 oz).  EXAM:   GENERAL APPEARANCE: healthy, alert and no distress  EYES: Eyes grossly normal to inspection and conjunctivae and sclerae normal  HENT: ear canals and TM's normal, nose and mouth without ulcers or lesions, oropharynx clear and oral mucous membranes moist  NECK: no adenopathy, no asymmetry, masses, or scars and thyroid normal to palpation  RESP: lungs clear " to auscultation - no rales, rhonchi or wheezes  BREAST: no palpable axillary masses or adenopathy  CV: regular rates and rhythm, no murmur, click or rub, no peripheral edema and peripheral pulses strong  ABDOMEN: soft, nontender, no hepatosplenomegaly, no masses and bowel sounds normal   (female): deferred  MS: no musculoskeletal defects are noted and gait is age appropriate without ataxia  Exam of the injured ankle reveals swelling and tenderness over the lateral malleolus. No tenderness over the medial aspect of the ankle. The fifth metatarsal is not tender. The ankle joint is intact without excessive opening on stressing.The rest of the foot, ankle and leg exam is normal.  SKIN: no suspicious lesions or rashes  NEURO: Normal strength and tone, sensory exam grossly normal, mentation intact and speech normal  PSYCH: mentation appears normal and affect normal/bright    Diagnostic Test Results:  Labs reviewed in Epic  Results for orders placed or performed in visit on 05/11/21   XR Ankle Right G/E 3 Views     Status: None    Narrative    ANKLE RIGHT THREE OR MORE VIEWS May 11, 2021 11:19 AM     HISTORY: Chronic pain of right ankle.    COMPARISON: 12/10/2015 x-ray.      Impression    IMPRESSION: Moderate tibiotalar degenerative changes. On the AP view  there appear to be two small chronic avulsion fragments adjacent to  the tip of the lateral malleolus. No evidence of acute fracture.  Mortise is congruent. No talar dome osteochondral abnormalities. Small  posterior calcaneal spur.    ALEA NG MD   Results for orders placed or performed in visit on 05/11/21   Lipid panel reflex to direct LDL Fasting     Status: Abnormal   Result Value Ref Range    Cholesterol 186 <200 mg/dL    Triglycerides 84 <150 mg/dL    HDL Cholesterol 60 >49 mg/dL    LDL Cholesterol Calculated 109 (H) <100 mg/dL    Non HDL Cholesterol 126 <130 mg/dL   Albumin Random Urine Quantitative with Creat Ratio     Status: None   Result Value Ref  Range    Creatinine Urine 55 mg/dL    Albumin Urine mg/L <5 mg/L    Albumin Urine mg/g Cr Unable to calculate due to low value 0 - 25 mg/g Cr   Comprehensive metabolic panel     Status: Abnormal   Result Value Ref Range    Sodium 139 133 - 144 mmol/L    Potassium 4.1 3.4 - 5.3 mmol/L    Chloride 107 94 - 109 mmol/L    Carbon Dioxide 27 20 - 32 mmol/L    Anion Gap 5 3 - 14 mmol/L    Glucose 104 (H) 70 - 99 mg/dL    Urea Nitrogen 15 7 - 30 mg/dL    Creatinine 0.67 0.52 - 1.04 mg/dL    GFR Estimate 88 >60 mL/min/[1.73_m2]    GFR Estimate If Black >90 >60 mL/min/[1.73_m2]    Calcium 8.5 8.5 - 10.1 mg/dL    Bilirubin Total 0.6 0.2 - 1.3 mg/dL    Albumin 3.7 3.4 - 5.0 g/dL    Protein Total 8.2 6.8 - 8.8 g/dL    Alkaline Phosphatase 84 40 - 150 U/L    ALT 40 0 - 50 U/L    AST 26 0 - 45 U/L   Hemoglobin A1c     Status: Abnormal   Result Value Ref Range    Hemoglobin A1C 6.9 (H) 0 - 5.6 %   Ferritin     Status: Abnormal   Result Value Ref Range    Ferritin 265 (H) 8 - 252 ng/mL   CBC with platelets     Status: Abnormal   Result Value Ref Range    WBC 5.6 4.0 - 11.0 10e9/L    RBC Count 4.84 3.8 - 5.2 10e12/L    Hemoglobin 13.1 11.7 - 15.7 g/dL    Hematocrit 38.6 35.0 - 47.0 %    MCV 80 78 - 100 fl    MCH 27.1 26.5 - 33.0 pg    MCHC 33.9 31.5 - 36.5 g/dL    RDW 15.1 (H) 10.0 - 15.0 %    Platelet Count 229 150 - 450 10e9/L       ASSESSMENT / PLAN:   Rissa was seen today for physical.    Diagnoses and all orders for this visit:    Encounter for Medicare annual wellness exam    Type 2 diabetes mellitus without complication, without long-term current use of insulin (H)  -     Albumin Random Urine Quantitative with Creat Ratio  -     Comprehensive metabolic panel  -     Hemoglobin A1c  foot care discussed and Podiatry visits discussed, annual eye examinations at Ophthalmology discussed and glycohemoglobin monitoring discussed  No changes in current regimen  Attempt to improve diet   Recheck in 6 months, sooner should new symptoms or    problems arise.    Hyperlipidemia LDL goal <100  -     Lipid panel reflex to direct LDL Fasting  -     pravastatin (PRAVACHOL) 20 MG tablet; Take 1 tablet (20 mg) by mouth daily  The current medical regimen is effective.  Continue current medication regimen unchanged.    Encounter for screening mammogram for breast cancer  -     *MA Screening Digital Bilateral; Future    Chronic pain of right ankle  -     XR Ankle Right G/E 3 Views; Future  -     diclofenac (VOLTAREN) 1 % topical gel; Apply 2 g topically 4 times daily  -     Orthopedic & Spine  Referral; Future    Muscle cramping  -     Ferritin  -     CBC with platelets  Will follow up and/or notify patient of  results via My Chart to determine further need for followup      PLAN:   Continue current medications as prescribed.    Patient needs to follow up in if no improvement,or sooner if worsening of symptoms or other symptoms develop.  FURTHER TESTING:       - mammogram  I will place order. Please call [phone] to schedule.  Will follow up and/or notify patient of  results via My Chart to determine further need for followup  Follow up office visit in one year for annual health maintenance exam, sooner PRN.    Patient has been advised of split billing requirements and indicates understanding: Yes    COUNSELING:  Reviewed preventive health counseling, as reflected in patient instructions  Special attention given to:       Regular exercise       Healthy diet/nutrition       Osteoporosis prevention/bone health       Colon cancer screening       Hepatitis C screening       The 10-year ASCVD risk score (Jeannie EDWARDS Jr., et al., 2013) is: 21.5%    Values used to calculate the score:      Age: 71 years      Sex: Female      Is Non- : No      Diabetic: Yes      Tobacco smoker: No      Systolic Blood Pressure: 130 mmHg      Is BP treated: No      HDL Cholesterol: 60 mg/dL      Total Cholesterol: 186 mg/dL       Advanced Planning  "    Estimated body mass index is 26.49 kg/m  as calculated from the following:    Height as of 2/11/20: 1.549 m (5' 1\").    Weight as of 2/11/20: 63.6 kg (140 lb 3.2 oz).        She reports that she has never smoked. She has never used smokeless tobacco.    Appropriate preventive services were discussed with this patient, including applicable screening as appropriate for cardiovascular disease, diabetes, osteopenia/osteoporosis, and glaucoma.  As appropriate for age/gender, discussed screening for colorectal cancer, prostate cancer, breast cancer, and cervical cancer. Checklist reviewing preventive services available has been given to the patient.    Reviewed patients plan of care and provided an AVS. The Intermediate Care Plan ( asthma action plan, low back pain action plan, and migraine action plan) for Rissa meets the Care Plan requirement. This Care Plan has been established and reviewed with the Patient.    Counseling Resources:  ATP IV Guidelines  Pooled Cohorts Equation Calculator  Breast Cancer Risk Calculator  BRCA-Related Cancer Risk Assessment: FHS-7 Tool  FRAX Risk Assessment  ICSI Preventive Guidelines  Dietary Guidelines for Americans, 2010  USDA's MyPlate  ASA Prophylaxis  Lung CA Screening    CAMILO Benavides [facility]  "

## 2021-05-12 NOTE — RESULT ENCOUNTER NOTE
Kishor Acosta,    Attached are your test results.  Moderate arthritis changes and maybe signs of old fracture   Will make referral to orthopedics    Please call [phone] to make appointment  if you do not hear from referrals in the next few days.      Please contact us if you have any questions.    Cherie Covington, CNP

## 2021-05-13 NOTE — RESULT ENCOUNTER NOTE
Kishor Floodshaneka Hodge Acosta,    Attached are your test results.  -Cholesterol levels are at your goal levels.  ADVISE: continuing your medication, a regular exercise program with at least 150 minutes of aerobic exercise per week, and eating a low saturated fat/low carbohydrate diet.  Also, you should recheck this fasting cholesterol panel in 12 months.  -Liver and gallbladder tests (ALT,AST, Alk phos,bilirubin) are normal.  -Kidney function (GFR) is normal.  -Sodium is normal.  -Potassium is normal.  -Calcium is normal.  -Glucose is elevated due to your diabetes.  -Ferritin slightly elevated. Are you taking an iron supplement?   Please contact us if you have any questions.    Cherie Covington, CNP

## 2021-07-04 DIAGNOSIS — M81.0 AGE-RELATED OSTEOPOROSIS WITHOUT CURRENT PATHOLOGICAL FRACTURE: ICD-10-CM

## 2021-07-08 DIAGNOSIS — E11.9 TYPE 2 DIABETES MELLITUS WITHOUT COMPLICATION, WITHOUT LONG-TERM CURRENT USE OF INSULIN (H): ICD-10-CM

## 2021-07-08 NOTE — TELEPHONE ENCOUNTER
"Requested Prescriptions   Pending Prescriptions Disp Refills     blood glucose monitoring (ACCU-CHEK FASTCLIX) lancets [Pharmacy Med Name: ACCU-CHEK FASTCLIX LANCET DRUM] 102 each 1     Sig: USE TO TEST 1 TO 2 TIMES DAILY, AND AS NEEDED. FILL WHAT IS COMPATIBLE W /DEVICE AND INS.       Diabetic Supplies Protocol Failed - 7/8/2021  2:24 PM        Failed - Recent (6 mo) or future (30 days) visit within the authorizing provider's specialty     Patient had office visit in the last 6 months or has a visit in the next 30 days with authorizing provider.  See \"Patient Info\" tab in inbasket, or \"Choose Columns\" in Meds & Orders section of the refill encounter.            Passed - Medication is active on med list        Passed - Patient is 18 years of age or older           Ina GUERRA, RN    "

## 2021-07-09 RX ORDER — LANCETS
EACH MISCELLANEOUS
Qty: 102 EACH | Refills: 1 | Status: SHIPPED | OUTPATIENT
Start: 2021-07-09 | End: 2021-10-21

## 2021-08-02 ENCOUNTER — PATIENT OUTREACH (OUTPATIENT)
Dept: GERIATRIC MEDICINE | Facility: CLINIC | Age: 71
End: 2021-08-02

## 2021-08-02 NOTE — PROGRESS NOTES
[facility] Care Coordination Contact  CHW, spoke w/ mbr daughter (Mojgan) to remind to have mbr schedule diabetic eye exam.  Mojgan noted she would follow up however expressed concerns about COVID. CHW asked ed mbr had COVID shot and she noted yes.  CHW,explained mbr should go  To appt when she is comfortable going to clinic and reminded her clinics are also taking safety precautions for mbrs.       FAITH Hampton  [facility]  [phone]

## 2021-09-26 ENCOUNTER — HEALTH MAINTENANCE LETTER (OUTPATIENT)
Age: 71
End: 2021-09-26

## 2021-10-20 DIAGNOSIS — M25.571 CHRONIC PAIN OF RIGHT ANKLE: ICD-10-CM

## 2021-10-20 DIAGNOSIS — E11.9 TYPE 2 DIABETES MELLITUS WITHOUT COMPLICATION, WITHOUT LONG-TERM CURRENT USE OF INSULIN (H): ICD-10-CM

## 2021-10-20 DIAGNOSIS — G89.29 CHRONIC PAIN OF RIGHT ANKLE: ICD-10-CM

## 2021-10-20 DIAGNOSIS — M54.16 LUMBAR RADICULOPATHY: ICD-10-CM

## 2021-10-21 RX ORDER — IBUPROFEN 600 MG/1
TABLET, FILM COATED ORAL
Qty: 180 TABLET | Refills: 0 | Status: SHIPPED | OUTPATIENT
Start: 2021-10-21 | End: 2024-03-15

## 2021-10-21 RX ORDER — LANCETS
EACH MISCELLANEOUS
Qty: 102 EACH | Refills: 1 | Status: SHIPPED | OUTPATIENT
Start: 2021-10-21 | End: 2022-03-25

## 2021-10-21 NOTE — TELEPHONE ENCOUNTER
Routing refill request to provider for review/approval because:  Patient is > 64 years old so RN unable to refill this medication per protocol.    Routing to provider to advise.  Ina Dunlap BSN, RN

## 2021-11-12 ENCOUNTER — PATIENT OUTREACH (OUTPATIENT)
Dept: GERIATRIC MEDICINE | Facility: CLINIC | Age: 71
End: 2021-11-12
Payer: COMMERCIAL

## 2021-11-12 NOTE — PROGRESS NOTES
[facility] Care Coordination Contact    Called member to complete six month assessment and left a message using Divehi  requesting a return call.  Faby Sears RN  [facility] Care Coordinator  [phone]

## 2021-11-21 ENCOUNTER — HEALTH MAINTENANCE LETTER (OUTPATIENT)
Age: 71
End: 2021-11-21

## 2021-12-01 NOTE — PROGRESS NOTES
[facility] Care Coordination Contact    CC left another message today with member requesting a call back for six month assessment.   Faby Sears RN  [facility] Care Coordinator  [phone]

## 2021-12-09 NOTE — PROGRESS NOTES
[facility] Care Coordination Contact      [facility] Six-Month Telephone Assessment    6 month telephone assessment completed on 12/09/21. Member called CC back after third message.    ER visits: No  Hospitalizations: No  TCU stays: No  Significant health status changes: None  Falls/Injuries: No  ADL/IADL changes: No  Changes in services: No    Caregiver Assessment follow up:  NA    Goals: See POC in chart for goal progress documentation.  Member encouraged to make an appointment for an eye exam. She states she is reluctant to do this because of Covid. CC informed member of the importance of yearly eye exams due to diabetes.     Will see member in 6 months for an annual health risk assessment.   Encouraged member to call CC with any questions or concerns in the meantime.   Faby Sears RN  [facility] Care Coordinator  [phone]             [phone]

## 2022-02-17 PROBLEM — Z76.89 HEALTH CARE HOME: Status: RESOLVED | Noted: 2019-02-08 | Resolved: 2021-04-22

## 2022-03-09 ENCOUNTER — PATIENT OUTREACH (OUTPATIENT)
Dept: GERIATRIC MEDICINE | Facility: CLINIC | Age: 72
End: 2022-03-09
Payer: MEDICARE

## 2022-03-09 NOTE — PROGRESS NOTES
[facility] Care Coordination Contact    Called member to schedule annual HRA telephone visit. Left a message requesting a return call to schedule HRA. CC contacted member using Spanish .   Faby Sears RN  [facility] Care Coordinator  [phone]

## 2022-03-09 NOTE — LETTER
March 15, 2022    RISSA DONOVAN DEISI  [address], [address]  [place] [address]    Dear Rissa:     Your Care Coordinator has been unable to reach you by telephone.I am writing to ask you or an authorized representative to call me at [phone]. If you reach my voicemail, please leave a message with your daytime telephone number and a date and time that I can call you. If you are hearing impaired, please call the [place] Relay at 914 or [phone] (qskgly-ud-wdkzah relay service).     The reason I am trying to reach you is:     [] Six (6) month check-in  [x] To schedule your annual assessment  [] Other:      Please call me as soon as you receive this letter. I look forward to speaking with you.    Sincerely,    Faby Sears RN, MA    E-mail: [email or web address]  Phone: [phone]      [facility]+ [id number] DHS Approved([id number])    [id number] (2/19)

## 2022-03-11 NOTE — PROGRESS NOTES
[facility] Care Coordination Contact    CC left another message on 03/11 requesting a call back for annual assessment.   Faby Sears RN  [facility] Care Coordinator  [phone]

## 2022-03-15 NOTE — PROGRESS NOTES
"[facility] Care Coordination Contact    Per CC, mailed client an \"Unable to Contact\" letter.    Nola Pereira  Case Management Specialist  [facility]  [phone]    "

## 2022-03-18 ENCOUNTER — PATIENT OUTREACH (OUTPATIENT)
Dept: GERIATRIC MEDICINE | Facility: CLINIC | Age: 72
End: 2022-03-18
Payer: MEDICARE

## 2022-03-18 ASSESSMENT — ACTIVITIES OF DAILY LIVING (ADL): DEPENDENT_IADLS:: TRANSPORTATION;MONEY MANAGEMENT

## 2022-03-18 NOTE — PROGRESS NOTES
[facility] Care Coordination Contact    [facility] Home Visit Assessment     Telephone visit for Health Risk Assessment with Rissa Ayesha Acosta completed on March 18, 2022 due to Covid-19 restrictions.      Assessment completed with:: Patient    Current Care Plan  Member currently receiving the following home care services:   None  Member currently receiving the following community resources: None      Medication Review  Medication reconciliation completed in Epic: Yes  Medication set-up & administration: Independent and sets up on own weekly.  Self-administers medications.  Medication Risk Assessment Medication (1 or more, place referral to MTM): N/A: No risk factors identified  MTM Referral Placed: No: No risk factors idenified    Mental/Behavioral Health   Depression Screening:   PHQ-2 Total Score (Adult) - Positive if 3 or more points; Administer PHQ-9 if positive: 0     Mental health DX:: No        Falls Assessment:   Fallen 2 or more times in the past year?: No   Any fall with injury in the past year?: No    ADL/IADL Dependencies:   Dependent ADLs:: Independent, Ambulation-no assistive device  Dependent IADLs:: Transportation, Money Management    [facility] Plan sponsored benefits: Shared information re: Silver Sneakers/gym memberships, ASA, Calcium +D.    PCA Assessment completed at visit: Not Applicable     Elderly Waiver Eligibility: No-does not meet criteria    Care Plan & Recommendations: Member declines need for any services at this time. She states her friend helps as needed. Family is also available.     See CC for detailed assessment information.    Follow-Up Plan: Member informed of future contact, plan to f/u with member with a 6 month telephone assessment.  Contact information shared with member and family, encouraged member to call with any questions or concerns at any time.    [place] care continuum providers: Please refer to Health Care Home on the [facility] Problem List to view this  patient's [facility] Care Plan Summary.  Faby Sears RN  [facility] Care Coordinator  [phone]

## 2022-03-25 ENCOUNTER — TELEPHONE (OUTPATIENT)
Dept: FAMILY MEDICINE | Facility: CLINIC | Age: 72
End: 2022-03-25
Payer: MEDICARE

## 2022-03-25 DIAGNOSIS — E11.9 TYPE 2 DIABETES MELLITUS WITHOUT COMPLICATION, WITHOUT LONG-TERM CURRENT USE OF INSULIN (H): ICD-10-CM

## 2022-03-25 RX ORDER — LANCETS
1 EACH MISCELLANEOUS DAILY
Qty: 100 EACH | Refills: 11 | Status: SHIPPED | OUTPATIENT
Start: 2022-03-25

## 2022-03-25 NOTE — TELEPHONE ENCOUNTER
Pharmacy is calling regarding lancets received today.    Medicare will only pay for specific directions: once a day or twice a day.    Medicare will only cover once a day if patient is not on insulin.  Looks like patient is not on insulin.    Susie Calhoun RN

## 2022-03-28 ENCOUNTER — PATIENT OUTREACH (OUTPATIENT)
Dept: GERIATRIC MEDICINE | Facility: CLINIC | Age: 72
End: 2022-03-28
Payer: MEDICARE

## 2022-03-28 NOTE — PROGRESS NOTES
[facility] Care Coordination Contact    Received after visit chart from care coordinator.  Completed following tasks: Mailed copy of care plan to client, Updated services in access and Mailed POC sig sheet w/SASE.  Mailed Medication disposal info.  Mailed HCD info.   Nola Pereira  Case Management Specialist  [facility]  [phone]

## 2022-03-28 NOTE — LETTER
March 28, 2022    CHARLI LIPSCOMB  [address], [address]  [place] [address]        Dear Charli:    At [facility], we are dedicated to improving your health and well-being. Enclosed is the Comprehensive Care Plan that we developed with you on 3/18/2022. Please review the Care Plan carefully.    As a reminder, some of the things we discussed at your visit include:    Your physical and mental health    Ways to reduce falls    Health care needs you may have    Don t forget to contact your care coordinator if you:    Have been hospitalized or plan to be hospitalized     Have had a fall     Have experienced a change in physical health    Are experiencing emotional problems     If you do not agree with your Care Plan, have questions about it, or have experienced a change in your needs, please call me at [phone]. If you are hearing impaired, please call the [place] Relay at 434 or [phone] (spnvlr-lt-muqjik relay service).    Sincerely,        Faby Sears RN, MA    E-mail: [email or web address]  Phone: [phone]      [facility]+[id number] [place] ([id number]     [id number] (11/18)

## 2022-04-04 ENCOUNTER — PATIENT OUTREACH (OUTPATIENT)
Dept: GERIATRIC MEDICINE | Facility: CLINIC | Age: 72
End: 2022-04-04
Payer: MEDICARE

## 2022-04-04 NOTE — PROGRESS NOTES
[facility] Care Coordination Contact    CHW, elida zhu/Mojgan to remind mbr to schedule eye exam. Left contact info if questions.    FAITH Hampton  [facility]  [phone]

## 2022-04-20 ENCOUNTER — DOCUMENTATION ONLY (OUTPATIENT)
Dept: OTHER | Facility: CLINIC | Age: 72
End: 2022-04-20
Payer: MEDICARE

## 2022-05-05 ENCOUNTER — DOCUMENTATION ONLY (OUTPATIENT)
Dept: OTHER | Facility: CLINIC | Age: 72
End: 2022-05-05
Payer: MEDICARE

## 2022-05-08 ENCOUNTER — HEALTH MAINTENANCE LETTER (OUTPATIENT)
Age: 72
End: 2022-05-08

## 2022-05-20 ENCOUNTER — PATIENT OUTREACH (OUTPATIENT)
Dept: GERIATRIC MEDICINE | Facility: CLINIC | Age: 72
End: 2022-05-20
Payer: MEDICARE

## 2022-05-20 NOTE — PROGRESS NOTES
[facility] Care Coordination Contact    Internal CC change effective 6/1/2022.  Mailed member CC Change letter.  Additional tasks to be completed by CMS include: update database & EPIC, enter CC Change in MMIS, and move member files on Q drive.    Nola Pereira  Case Management Specialist  [facility]  [phone]

## 2022-05-20 NOTE — LETTER
May 20, 2022    CHARLI DONOVAN LIPSCOMB  [address], [address]  [place] [address]      Dear Charli:    As a member of [facility] ([facility] you are provided a care coordinator. I will be your new care coordinator as of 6/1/2022. I will be calling you soon to see how you are doing and determine your needs.    If you have any questions, please feel free to call me at [phone]. If you reach my voice mail, please leave a message and your phone number. If you are hearing impaired, please call the [place] [employer]ay at [phone] or [phone] (goxrgg-ac-eixcce relay service).    I look forward to speaking with you soon.    Sincerely,        Marifer Barboza RN, BSN, PHN  [phone]  [email or web address]          [facility]  [id number] DHS Approved ([id number])  [id number] (11/18)

## 2022-06-30 ENCOUNTER — PATIENT OUTREACH (OUTPATIENT)
Dept: GERIATRIC MEDICINE | Facility: CLINIC | Age: 72
End: 2022-06-30

## 2022-06-30 NOTE — PROGRESS NOTES
[facility] Care Coordination Contact    Called to member mobile [phone]. Left a voice mail via  ID NANDO introduced CC and role. Member to call CC for questions or services, otherwise CC will follow up with member in September. Contact info provided.   Marifer Barboza RN  [facility]  [phone]

## 2022-07-03 ENCOUNTER — HEALTH MAINTENANCE LETTER (OUTPATIENT)
Age: 72
End: 2022-07-03

## 2022-08-03 DIAGNOSIS — E78.5 HYPERLIPIDEMIA LDL GOAL <100: ICD-10-CM

## 2022-08-04 RX ORDER — PRAVASTATIN SODIUM 20 MG
TABLET ORAL
Qty: 90 TABLET | Refills: 0 | Status: SHIPPED | OUTPATIENT
Start: 2022-08-04 | End: 2022-11-03

## 2022-08-04 NOTE — TELEPHONE ENCOUNTER
Appointments in Next Year    Aug 26, 2022  7:00 AM  (Arrive by 6:45 AM)  Annual Wellness Visit with CAMILO Benavides CNP  [facility] ([facility] ) [phone]

## 2022-08-19 ENCOUNTER — PATIENT OUTREACH (OUTPATIENT)
Dept: GERIATRIC MEDICINE | Facility: CLINIC | Age: 72
End: 2022-08-19

## 2022-08-19 NOTE — PROGRESS NOTES
CC updated program tasks and targets for [facility] Cecilia launch.  Marifer Barboza RN  [facility]  [phone]

## 2022-08-26 ENCOUNTER — OFFICE VISIT (OUTPATIENT)
Dept: FAMILY MEDICINE | Facility: CLINIC | Age: 72
End: 2022-08-26
Payer: MEDICARE

## 2022-08-26 ENCOUNTER — ANCILLARY PROCEDURE (OUTPATIENT)
Dept: GENERAL RADIOLOGY | Facility: CLINIC | Age: 72
End: 2022-08-26
Attending: NURSE PRACTITIONER
Payer: MEDICARE

## 2022-08-26 VITALS
DIASTOLIC BLOOD PRESSURE: 84 MMHG | TEMPERATURE: 98.6 F | RESPIRATION RATE: 14 BRPM | SYSTOLIC BLOOD PRESSURE: 136 MMHG | HEART RATE: 72 BPM | HEIGHT: 61 IN | BODY MASS INDEX: 26.71 KG/M2 | WEIGHT: 141.5 LBS | OXYGEN SATURATION: 97 %

## 2022-08-26 DIAGNOSIS — R79.89 ELEVATED FERRITIN: ICD-10-CM

## 2022-08-26 DIAGNOSIS — Z12.31 ENCOUNTER FOR SCREENING MAMMOGRAM FOR BREAST CANCER: ICD-10-CM

## 2022-08-26 DIAGNOSIS — K21.9 GASTROESOPHAGEAL REFLUX DISEASE WITHOUT ESOPHAGITIS: ICD-10-CM

## 2022-08-26 DIAGNOSIS — M51.369 DDD (DEGENERATIVE DISC DISEASE), LUMBAR: ICD-10-CM

## 2022-08-26 DIAGNOSIS — M25.571 RIGHT ANKLE PAIN, UNSPECIFIED CHRONICITY: ICD-10-CM

## 2022-08-26 DIAGNOSIS — R35.0 URINARY FREQUENCY: ICD-10-CM

## 2022-08-26 DIAGNOSIS — Z00.00 ENCOUNTER FOR MEDICARE ANNUAL WELLNESS EXAM: ICD-10-CM

## 2022-08-26 DIAGNOSIS — Z13.0 SCREENING FOR DISORDER OF BLOOD AND BLOOD-FORMING ORGANS: ICD-10-CM

## 2022-08-26 DIAGNOSIS — Z00.00 ROUTINE GENERAL MEDICAL EXAMINATION AT A HEALTH CARE FACILITY: Primary | ICD-10-CM

## 2022-08-26 DIAGNOSIS — E11.9 TYPE 2 DIABETES MELLITUS WITHOUT COMPLICATION, WITHOUT LONG-TERM CURRENT USE OF INSULIN (H): ICD-10-CM

## 2022-08-26 DIAGNOSIS — E78.5 HYPERLIPIDEMIA LDL GOAL <100: ICD-10-CM

## 2022-08-26 DIAGNOSIS — Z13.29 SCREENING FOR THYROID DISORDER: ICD-10-CM

## 2022-08-26 LAB
ALBUMIN UR-MCNC: NEGATIVE MG/DL
APPEARANCE UR: CLEAR
BACTERIA #/AREA URNS HPF: ABNORMAL /HPF
BILIRUB UR QL STRIP: NEGATIVE
CHOLEST SERPL-MCNC: 170 MG/DL
COLOR UR AUTO: YELLOW
CREAT UR-MCNC: 105 MG/DL
ERYTHROCYTE [DISTWIDTH] IN BLOOD BY AUTOMATED COUNT: 14.1 % (ref 10–15)
FASTING STATUS PATIENT QL REPORTED: YES
FERRITIN SERPL-MCNC: 338 NG/ML (ref 8–252)
GLUCOSE UR STRIP-MCNC: NEGATIVE MG/DL
HBA1C MFR BLD: 7.4 % (ref 0–5.6)
HCT VFR BLD AUTO: 40.6 % (ref 35–47)
HDLC SERPL-MCNC: 52 MG/DL
HGB BLD-MCNC: 13.4 G/DL (ref 11.7–15.7)
HGB UR QL STRIP: NEGATIVE
KETONES UR STRIP-MCNC: ABNORMAL MG/DL
LDLC SERPL CALC-MCNC: 95 MG/DL
LEUKOCYTE ESTERASE UR QL STRIP: NEGATIVE
MCH RBC QN AUTO: 26.9 PG (ref 26.5–33)
MCHC RBC AUTO-ENTMCNC: 33 G/DL (ref 31.5–36.5)
MCV RBC AUTO: 82 FL (ref 78–100)
MICROALBUMIN UR-MCNC: 7 MG/L
MICROALBUMIN/CREAT UR: 6.67 MG/G CR (ref 0–25)
MUCOUS THREADS #/AREA URNS LPF: PRESENT /LPF
NITRATE UR QL: NEGATIVE
NONHDLC SERPL-MCNC: 118 MG/DL
PH UR STRIP: 6 [PH] (ref 5–7)
PLATELET # BLD AUTO: 217 10E3/UL (ref 150–450)
RBC # BLD AUTO: 4.98 10E6/UL (ref 3.8–5.2)
RBC #/AREA URNS AUTO: ABNORMAL /HPF
SP GR UR STRIP: 1.02 (ref 1–1.03)
SQUAMOUS #/AREA URNS AUTO: ABNORMAL /LPF
TRIGL SERPL-MCNC: 115 MG/DL
TSH SERPL DL<=0.005 MIU/L-ACNC: 0.92 MU/L (ref 0.4–4)
UROBILINOGEN UR STRIP-ACNC: 0.2 E.U./DL
WBC # BLD AUTO: 6.2 10E3/UL (ref 4–11)
WBC #/AREA URNS AUTO: ABNORMAL /HPF

## 2022-08-26 PROCEDURE — 73610 X-RAY EXAM OF ANKLE: CPT | Mod: TC | Performed by: FAMILY MEDICINE

## 2022-08-26 PROCEDURE — 82043 UR ALBUMIN QUANTITATIVE: CPT | Performed by: NURSE PRACTITIONER

## 2022-08-26 PROCEDURE — 84443 ASSAY THYROID STIM HORMONE: CPT | Performed by: NURSE PRACTITIONER

## 2022-08-26 PROCEDURE — 36415 COLL VENOUS BLD VENIPUNCTURE: CPT | Performed by: NURSE PRACTITIONER

## 2022-08-26 PROCEDURE — 82728 ASSAY OF FERRITIN: CPT | Performed by: NURSE PRACTITIONER

## 2022-08-26 PROCEDURE — 85027 COMPLETE CBC AUTOMATED: CPT | Performed by: NURSE PRACTITIONER

## 2022-08-26 PROCEDURE — 81001 URINALYSIS AUTO W/SCOPE: CPT | Performed by: NURSE PRACTITIONER

## 2022-08-26 PROCEDURE — G0439 PPPS, SUBSEQ VISIT: HCPCS | Performed by: NURSE PRACTITIONER

## 2022-08-26 PROCEDURE — 83036 HEMOGLOBIN GLYCOSYLATED A1C: CPT | Performed by: NURSE PRACTITIONER

## 2022-08-26 PROCEDURE — 99213 OFFICE O/P EST LOW 20 MIN: CPT | Mod: 25 | Performed by: NURSE PRACTITIONER

## 2022-08-26 PROCEDURE — 80061 LIPID PANEL: CPT | Performed by: NURSE PRACTITIONER

## 2022-08-26 PROCEDURE — 99207 PR FOOT EXAM NO CHARGE: CPT | Performed by: NURSE PRACTITIONER

## 2022-08-26 RX ORDER — OMEPRAZOLE 20 MG/1
20 TABLET, DELAYED RELEASE ORAL DAILY
Qty: 30 TABLET | Refills: 1 | Status: SHIPPED | OUTPATIENT
Start: 2022-08-26 | End: 2023-03-07

## 2022-08-26 ASSESSMENT — ENCOUNTER SYMPTOMS
WEAKNESS: 0
DYSURIA: 0
PARESTHESIAS: 0
PALPITATIONS: 0
COUGH: 0
BREAST MASS: 0
SHORTNESS OF BREATH: 0
SORE THROAT: 0
JOINT SWELLING: 0
ARTHRALGIAS: 1
HEMATURIA: 0
CHILLS: 0
ABDOMINAL PAIN: 0
CONSTIPATION: 0
NERVOUS/ANXIOUS: 0
NAUSEA: 0
DIZZINESS: 0
HEMATOCHEZIA: 0
FEVER: 0
HEADACHES: 0
FREQUENCY: 1
MYALGIAS: 1
EYE PAIN: 0
DIARRHEA: 0

## 2022-08-26 ASSESSMENT — PATIENT HEALTH QUESTIONNAIRE - PHQ9
SUM OF ALL RESPONSES TO PHQ QUESTIONS 1-9: 0
10. IF YOU CHECKED OFF ANY PROBLEMS, HOW DIFFICULT HAVE THESE PROBLEMS MADE IT FOR YOU TO DO YOUR WORK, TAKE CARE OF THINGS AT HOME, OR GET ALONG WITH OTHER PEOPLE: NOT DIFFICULT AT ALL
SUM OF ALL RESPONSES TO PHQ QUESTIONS 1-9: 0

## 2022-08-26 ASSESSMENT — PAIN SCALES - GENERAL: PAINLEVEL: MILD PAIN (3)

## 2022-08-26 ASSESSMENT — ACTIVITIES OF DAILY LIVING (ADL): CURRENT_FUNCTION: NO ASSISTANCE NEEDED

## 2022-08-26 NOTE — RESULT ENCOUNTER NOTE
Kishor Acosta,    Attached are your test results.  Ankle shows arthritis   With it bothering you for several months we can refer you to orthopedics  They will call to make appointment    Please contact us if you have any questions.    Cherie Covington, CNP

## 2022-08-26 NOTE — PROGRESS NOTES
"SUBJECTIVE:   Rissa Acosta is a 72 year old female who presents for Preventive Visit.    Rissa Acosta is accompanied today by  and spouse      Patient has been advised of split billing requirements and indicates understanding: Yes  Are you in the first 12 months of your Medicare coverage?  No    Healthy Habits:     In general, how would you rate your overall health?  Excellent    Frequency of exercise:  4-5 days/week    Duration of exercise:  15-30 minutes    Do you usually eat at least 4 servings of fruit and vegetables a day, include whole grains    & fiber and avoid regularly eating high fat or \"junk\" foods?  Yes    Taking medications regularly:  Yes    Medication side effects:  None    Ability to successfully perform activities of daily living:  No assistance needed    Home Safety:  No safety concerns identified    Hearing Impairment:  No hearing concerns    In the past 6 months, have you been bothered by leaking of urine?  No    In general, how would you rate your overall mental or emotional health?  Excellent      PHQ-2 Total Score: 0    Additional concerns today:  Yes    Do you feel safe in your environment? Yes    Have you ever done Advance Care Planning? (For example, a Health Directive, POLST, or a discussion with a medical provider or your loved ones about your wishes): No, advance care planning information given to patient to review.  Patient declined advance care planning discussion at this time.       Fall risk  Fallen 2 or more times in the past year?: No  Any fall with injury in the past year?: No    Cognitive Screening   1) Repeat 3 items (Leader, Season, Table)    2) Clock draw: NORMAL  3) 3 item recall: Recalls NO objects   Results: 0 items recalled: PROBABLE COGNITIVE IMPAIRMENT, **INFORM PROVIDER**  - pt is using , clock was normal     Mini-CogTM Copyright JEFF Mcelroy. Licensed by the author for use in [facility]; reprinted with permission (yelena@.[facility]). " All rights reserved.      Do you have sleep apnea, excessive snoring or daytime drowsiness?: Pt reports snoring    Reviewed and updated as needed this visit by clinical staff   Tobacco  Allergies    Med Hx  Surg Hx  Fam Hx  Soc Hx          Reviewed and updated as needed this visit by Provider                   Social History     Tobacco Use     Smoking status: Never Smoker     Smokeless tobacco: Never Used   Substance Use Topics     Alcohol use: No         Alcohol Use 8/26/2022   Prescreen: >3 drinks/day or >7 drinks/week? No   Prescreen: >3 drinks/day or >7 drinks/week? -       Current providers sharing in care for this patient include:   Patient Care Team:  Cherie Covington APRN CNP as PCP - General (Family Practice)  Kalia Reddy MD as MD (Ophthalmology)  Mojgan Dave MD (Ophthalmology)  Millie Fuentes Chi, OD as MD (Optometry)  Cherie Covington APRN CNP as Assigned PCP  Marifer Barboza RN as Lead Care Coordinator (Primary Care - CC)    The following health maintenance items are reviewed in Epic and correct as of today:  Health Maintenance Due   Topic Date Due     EYE EXAM  04/29/2020     MAMMO SCREENING  02/18/2022     BMP  05/11/2022     INFLUENZA VACCINE (1) 09/01/2022     Lab work is in process  Labs reviewed in EPIC  BP Readings from Last 3 Encounters:   08/26/22 136/84   05/11/21 130/74   03/03/20 131/83    Wt Readings from Last 3 Encounters:   08/26/22 64.2 kg (141 lb 8 oz)   05/11/21 63.5 kg (140 lb)   02/11/20 63.6 kg (140 lb 3.2 oz)                  Patient Active Problem List   Diagnosis     CARDIOVASCULAR SCREENING; LDL GOAL LESS THAN 160     DDD (degenerative disc disease), cervical     DDD (degenerative disc disease), lumbar     Vitamin D insufficiency     Elevated LFTs     Positive PPD     Bilateral low back pain without sciatica     Lumbar radiculopathy     Advanced directives, counseling/discussion     Closed fracture of lumbar vertebra (H)     Spinal stenosis of lumbar  region     Viral hepatitis     Type 2 diabetes mellitus without complication, without long-term current use of insulin (H)     Osteopenia     Neuropathic pain, leg     Exposure to hepatitis B     Spinal stenosis of lumbar region without neurogenic claudication     Osteoporosis     Hyperlipidemia LDL goal <100     Past Surgical History:   Procedure Laterality Date     BACK SURGERY  2011    [facility]     CATARACT IOL, RT/LT Right 2005     COLONOSCOPY N/A 3/3/2020    Procedure: Colonoscopy, With Polypectomy And Biopsy;  Surgeon: Arlet Zaragoza DO;  Location: [facility] OR     COLONOSCOPY WITH CO2 INSUFFLATION N/A 3/3/2020    Procedure: COLONOSCOPY, WITH CO2 INSUFFLATION;  Surgeon: Arlet Zaragoza DO;  Location: [facility] OR       Social History     Tobacco Use     Smoking status: Never Smoker     Smokeless tobacco: Never Used   Substance Use Topics     Alcohol use: No     Family History   Problem Relation Age of Onset     Diabetes No family hx of      Macular Degeneration No family hx of      Glaucoma No family hx of      Strabismus No family hx of          Current Outpatient Medications   Medication Sig Dispense Refill     ACCU-CHEK GUIDE test strip USE TO TEST BLOOD SUGAR 1 TIMES DAILY OR AS DIRECTED. (MAX 30 DAYS) 50 strip 3     ascorbic acid (VITAMIN C) 500 MG tablet Take by mouth daily       B Complex Vitamins (VITAMIN-B COMPLEX PO)        blood glucose (BETSY CONTOUR NEXT) test strip Use to test blood sugar 2 times daily or as directed. 100 strip 3     blood glucose (NO BRAND SPECIFIED) test strip Use to test blood sugar  1 to 2  times daily or as directed. To accompany: Blood Glucose Monitor Brands: per insurance. 100 strip 11     blood glucose calibration (NO BRAND SPECIFIED) solution To accompany: Blood Glucose Monitor Brands: per insurance. 1 Bottle 3     blood glucose monitoring (ACCU-CHEK FASTCLIX) lancets 1 each by In Vitro route daily Use to test blood sugar 1 times daily or as directed. 100 each 11      blood glucose monitoring (BETSY MICROLET) lancets USE TO TEST 1 TO 2 TIMES DAILY, AND AS NEEDED 200 each 3     blood glucose monitoring (NO BRAND SPECIFIED) meter device kit Use to test blood sugar 1 to 2  times daily or as directed. Preferred blood glucose meter OR supplies to accompany: Blood Glucose Monitor Brands: per insurance. 1 kit 0     blood glucose monitoring (ONETOUCH VERIO) meter device kit USE TO TEST BLOOD SUGAR 2 TIMES DAILY OR AS DIRECTED. 1 kit 0     calcium carb-cholecalciferol 600-500 MG-UNIT CAPS TAKE 1 CAPSULE BY MOUTH TWICE A  capsule 3     Calcium Carbonate-Vitamin D (CALCIUM-VITAMIN D) 600-125 MG-UNIT TABS TAKE 1 CAPSULE BY MOUTH TWICE A DAY       diclofenac (VOLTAREN) 1 % topical gel APPLY 2 GRAMS TOPICALLY 4 TIMES DAILY 300 g 0     ibuprofen (ADVIL/MOTRIN) 600 MG tablet TAKE 1 TABLET BY MOUTH EVERY 12 HOURS AS NEEDED FOR MODERATE PAIN 180 tablet 0     Multiple Vitamins-Minerals (CHOICEFUL MULTIVITAMIN PO)        Omega-3 Fatty Acids (FISH OIL CONCENTRATE PO)        ONETOUCH VERIO IQ test strip USE TO TEST BLOOD SUGARS 2 TIMES DAILY OR AS DIRECTED 100 strip 3     ORDER FOR DME Equipment being ordered: walker  Wheeled and seated if needed 1 each 0     ORDER FOR DME Equipment being ordered: TENS 1 Units 0     pravastatin (PRAVACHOL) 20 MG tablet TAKE 1 TABLET BY MOUTH EVERY DAY 90 tablet 0     No Known Allergies  Recent Labs   Lab Test 05/11/21  1110 02/11/20  0819 01/17/19  0911   A1C 6.9* 6.8* 6.7*   * 86 78   HDL 60 57 61   TRIG 84 117 96   ALT 40 58* 44   CR 0.67 0.59 0.73   GFRESTIMATED 88 >90 84   GFRESTBLACK >90 >90 >90   POTASSIUM 4.1 4.5 4.7   TSH  --  0.60 0.90      Pneumonia Vaccine:Up to date    Mammogram Screening: Mammogram Screening: Recommended mammography every 1-2 years with patient discussion and risk factor consideration      Review of Systems   Constitutional: Negative for chills and fever.   HENT: Negative for congestion, ear pain, hearing loss and sore  "throat.    Eyes: Negative for pain and visual disturbance.   Respiratory: Negative for cough and shortness of breath.    Cardiovascular: Negative for chest pain, palpitations and peripheral edema.   Gastrointestinal: Negative for abdominal pain, constipation, diarrhea, hematochezia and nausea.   Endocrine: Negative for polydipsia, polyphagia and polyuria.        Positive for diabetes diet controlled      Breasts:  Negative for tenderness, breast mass and discharge.   Genitourinary: Positive for frequency and urgency. Negative for dysuria, genital sores, hematuria, pelvic pain, vaginal bleeding and vaginal discharge.        Has been going for about 6 months    Musculoskeletal: Positive for arthralgias and myalgias. Negative for joint swelling.        Has chronic low back pain     Also pain in right ankle that has been going on for several weeks. Does not remember any injury    Skin: Negative for rash.   Neurological: Negative for dizziness, weakness, headaches and paresthesias.   Psychiatric/Behavioral: Negative for mood changes. The patient is not nervous/anxious.      OBJECTIVE:   /84 (BP Location: Right arm, Patient Position: Sitting, Cuff Size: Adult Large)   Pulse 72   Temp 98.6  F (37  C) (Oral)   Resp 14   Ht 1.537 m (5' 0.5\")   Wt 64.2 kg (141 lb 8 oz)   SpO2 97%   BMI 27.18 kg/m   Estimated body mass index is 27.18 kg/m  as calculated from the following:    Height as of this encounter: 1.537 m (5' 0.5\").    Weight as of this encounter: 64.2 kg (141 lb 8 oz).  Physical Exam  GENERAL APPEARANCE: healthy, alert and no distress  EYES: Eyes grossly normal to inspection and conjunctivae and sclerae normal  HENT: ear canals and TM's normal, nose and mouth without ulcers or lesions, oropharynx clear and oral mucous membranes moist  NECK: no adenopathy, no asymmetry, masses, or scars and thyroid normal to palpation  RESP: lungs clear to auscultation - no rales, rhonchi or wheezes  BREAST: normal without " masses, tenderness or nipple discharge and no palpable axillary masses or adenopathy  CV: regular rates and rhythm, no peripheral edema and peripheral pulses strong  ABDOMEN: soft, nontender, no hepatosplenomegaly, no masses and bowel sounds normal   (female): deferred  MS: no musculoskeletal defects are noted and gait is age appropriate without ataxia  Exam of the right  ankle reveals swelling and tenderness over the lateral malleolus. No tenderness over the medial aspect of the ankle. The fifth metatarsal is not tender. The ankle joint is intact without excessive opening on stressing The rest of the foot, ankle and leg exam is normal.  normal DP and PT pulses, no trophic changes or ulcerative lesions and normal sensory exam  SKIN: no suspicious lesions or rashes  NEURO: Normal strength and tone, sensory exam grossly normal, mentation intact and speech normal  PSYCH: mentation appears normal and affect normal/bright    Diagnostic Test Results:  Labs reviewed in Epic  Results for orders placed or performed in visit on 08/26/22   XR Ankle Right G/E 3 Views     Status: None    Narrative    XR ANKLE RIGHT G/E 3 VIEWS 8/26/2022 8:11 AM     HISTORY: Right ankle pain, unspecified chronicity    COMPARISON: 5/11/2021      Impression    IMPRESSION: 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 No ankle joint  effusion. The talar dome is unremarkable. Chronic ossicles adjacent to  the lateral process of the talus. No fractures are evident. Small  plantar and Achilles calcaneal spurs.    MIHAI VALERO MD         SYSTEM ID:  [id number]   Results for orders placed or performed in visit on 08/26/22   HEMOGLOBIN A1C     Status: Abnormal   Result Value Ref Range    Hemoglobin A1C 7.4 (H) 0.0 - 5.6 %   Lipid panel reflex to direct LDL Non-fasting     Status: None   Result Value Ref Range    Cholesterol 170 <200 mg/dL    Triglycerides 115 <150 mg/dL    Direct Measure HDL 52 >=50 mg/dL    LDL Cholesterol Calculated 95 <=100 mg/dL     Non HDL Cholesterol 118 <130 mg/dL    Patient Fasting > 8hrs? Yes     Narrative    Cholesterol  Desirable:  <200 mg/dL    Triglycerides  Normal:  Less than 150 mg/dL  Borderline High:  150-199 mg/dL  High:  200-499 mg/dL  Very High:  Greater than or equal to 500 mg/dL    Direct Measure HDL  Female:  Greater than or equal to 50 mg/dL   Male:  Greater than or equal to 40 mg/dL    LDL Cholesterol  Desirable:  <100mg/dL  Above Desirable:  100-129 mg/dL   Borderline High:  130-159 mg/dL   High:  160-189 mg/dL   Very High:  >= 190 mg/dL    Non HDL Cholesterol  Desirable:  130 mg/dL  Above Desirable:  130-159 mg/dL  Borderline High:  160-189 mg/dL  High:  190-219 mg/dL  Very High:  Greater than or equal to 220 mg/dL   Albumin Random Urine Quantitative with Creat Ratio     Status: None   Result Value Ref Range    Creatinine Urine mg/dL 105 mg/dL    Albumin Urine mg/L 7 mg/L    Albumin Urine mg/g Cr 6.67 0.00 - 25.00 mg/g Cr   TSH with free T4 reflex     Status: Normal   Result Value Ref Range    TSH 0.92 0.40 - 4.00 mU/L   CBC with platelets     Status: Normal   Result Value Ref Range    WBC Count 6.2 4.0 - 11.0 10e3/uL    RBC Count 4.98 3.80 - 5.20 10e6/uL    Hemoglobin 13.4 11.7 - 15.7 g/dL    Hematocrit 40.6 35.0 - 47.0 %    MCV 82 78 - 100 fL    MCH 26.9 26.5 - 33.0 pg    MCHC 33.0 31.5 - 36.5 g/dL    RDW 14.1 10.0 - 15.0 %    Platelet Count 217 150 - 450 10e3/uL   Ferritin     Status: Abnormal   Result Value Ref Range    Ferritin 338 (H) 8 - 252 ng/mL   UA with Microscopic reflex to Culture     Status: Abnormal    Specimen: Urine, Clean Catch   Result Value Ref Range    Color Urine Yellow Colorless, Straw, Light Yellow, Yellow    Appearance Urine Clear Clear    Glucose Urine Negative Negative mg/dL    Bilirubin Urine Negative Negative    Ketones Urine Trace (A) Negative mg/dL    Specific Gravity Urine 1.025 1.003 - 1.035    Blood Urine Negative Negative    pH Urine 6.0 5.0 - 7.0    Protein Albumin Urine Negative  Negative mg/dL    Urobilinogen Urine 0.2 0.2, 1.0 E.U./dL    Nitrite Urine Negative Negative    Leukocyte Esterase Urine Negative Negative   Urine Microscopic     Status: Abnormal   Result Value Ref Range    Bacteria Urine Few (A) None Seen /HPF    RBC Urine 0-2 0-2 /HPF /HPF    WBC Urine 0-5 0-5 /HPF /HPF    Squamous Epithelials Urine Few (A) None Seen /LPF    Mucus Urine Present (A) None Seen /LPF    Narrative    Urine Culture not indicated       ASSESSMENT / PLAN:   Rissa was seen today for wellness visit.    Diagnoses and all orders for this visit:    Routine general medical examination at a health care facility  -     REVIEW OF HEALTH MAINTENANCE PROTOCOL ORDERS    Encounter for Medicare annual wellness exam  -     REVIEW OF HEALTH MAINTENANCE PROTOCOL ORDERS    Type 2 diabetes mellitus without complication, without long-term current use of insulin (H)  -     HEMOGLOBIN A1C; Future  -     Albumin Random Urine Quantitative with Creat Ratio; Future  -     HEMOGLOBIN A1C  -     Albumin Random Urine Quantitative with Creat Ratio  foot care discussed and Podiatry visits discussed, annual eye examinations at Ophthalmology discussed, glycohemoglobin monitoring discussed and long term diabetic complications discussed  No changes in current regimen   Recheck in 6 months, sooner should new symptoms or   problems arise.    Encounter for screening mammogram for breast cancer  -     MA SCREENING DIGITAL BILAT - Future  (s+30); Future    Hyperlipidemia LDL goal <100  -     Lipid panel reflex to direct LDL Non-fasting; Future  -     Lipid panel reflex to direct LDL Non-fasting    Screening for disorder of blood and blood-forming organs  -     CBC with platelets; Future  -     Ferritin; Future  -     CBC with platelets  -     Ferritin    Screening for thyroid disorder  -     TSH with free T4 reflex; Future  -     TSH with free T4 reflex    Elevated ferritin  -     Ferritin; Future  -     Ferritin    Urinary frequency  -     UA  with Microscopic reflex to Culture; Future  -     UA with Microscopic reflex to Culture  -     Urine Microscopic  Patient will call when needed    DDD (degenerative disc disease), lumbar  Continue current medications as prescribed.     Right ankle pain, unspecified chronicity  -     XR Ankle Right G/E 3 Views; Future  -     Orthopedic  Referral; Future    Gastroesophageal reflux disease without esophagitis  -     omeprazole (PRILOSEC OTC) 20 MG EC tablet; Take 1 tablet (20 mg) by mouth daily  Discussed that these symptoms is likely related to reflux or GERD. Discussed non-pharmacologic treatment, including elevating the head of bed, decrease food before bedtime and decreased caffeine and nicotine and alcohol.  Went over meds for reflux. Pt will try prilosec . Recheck if not improving as expected.    PLAN:   Patient needs to follow up in if no improvement,or sooner if worsening of symptoms or other symptoms develop.  FURTHER TESTING:       - mammogram  Follow up in 6 months.  Will follow up and/or notify patient of  results via My Chart to determine further need for followup  Follow up office visit in one year for annual health maintenance exam, sooner PRN.  Patient has been advised of split billing requirements and indicates understanding: Yes    COUNSELING:  Reviewed preventive health counseling, as reflected in patient instructions  Special attention given to:       Regular exercise       Healthy diet/nutrition       Vision screening       Osteoporosis prevention/bone health       Colon cancer screening       The 10-year ASCVD risk score (Jeannie EDWARDS Jr., et al., 2013) is: 25.7%    Values used to calculate the score:      Age: 72 years      Sex: Female      Is Non- : No      Diabetic: Yes      Tobacco smoker: No      Systolic Blood Pressure: 136 mmHg      Is BP treated: No      HDL Cholesterol: 52 mg/dL      Total Cholesterol: 170 mg/dL       Advanced Planning     Estimated body mass  "index is 27.18 kg/m  as calculated from the following:    Height as of this encounter: 1.537 m (5' 0.5\").    Weight as of this encounter: 64.2 kg (141 lb 8 oz).        She reports that she has never smoked. She has never used smokeless tobacco.      Appropriate preventive services were discussed with this patient, including applicable screening as appropriate for cardiovascular disease, diabetes, osteopenia/osteoporosis, and glaucoma.  As appropriate for age/gender, discussed screening for colorectal cancer, prostate cancer, breast cancer, and cervical cancer. Checklist reviewing preventive services available has been given to the patient.    Reviewed patients plan of care and provided an AVS. The Intermediate Care Plan ( asthma action plan, low back pain action plan, and migraine action plan) for Rissa meets the Care Plan requirement. This Care Plan has been established and reviewed with the Patient and spouse.    Counseling Resources:  ATP IV Guidelines  Pooled Cohorts Equation Calculator  Breast Cancer Risk Calculator  Breast Cancer: Medication to Reduce Risk  FRAX Risk Assessment  ICSI Preventive Guidelines  Dietary Guidelines for Americans, 2010  PDV's MyPlate  ASA Prophylaxis  Lung CA Screening    CAMILO Benavides [facility]    Identified Health Risks:  Answers for HPI/ROS submitted by the patient on 8/26/2022  If you checked off any problems, how difficult have these problems made it for you to do your work, take care of things at home, or get along with other people?: Not difficult at all  PHQ9 TOTAL SCORE: 0      "

## 2022-08-26 NOTE — PATIENT INSTRUCTIONS
PLAN:   1.   Symptomatic therapy suggested: will start on prilosec once a day for stomach .  Continue current medications as prescribed.   2.  Orders Placed This Encounter   Medications    Calcium Carbonate-Vitamin D (CALCIUM-VITAMIN D) 600-125 MG-UNIT TABS     Sig: TAKE 1 CAPSULE BY MOUTH TWICE A DAY    omeprazole (PRILOSEC OTC) 20 MG EC tablet     Sig: Take 1 tablet (20 mg) by mouth daily     Dispense:  30 tablet     Refill:  1     Orders Placed This Encounter   Procedures    REVIEW OF HEALTH MAINTENANCE PROTOCOL ORDERS    MA SCREENING DIGITAL BILAT - Future  (s+30)    XR Ankle Right G/E 3 Views    HEMOGLOBIN A1C    Lipid panel reflex to direct LDL Non-fasting    Albumin Random Urine Quantitative with Creat Ratio    TSH with free T4 reflex    CBC with platelets    Ferritin    UA with Microscopic reflex to Culture       3. Patient needs to follow up in if no improvement,or sooner if worsening of symptoms or other symptoms develop.  FURTHER TESTING:       - mammogram  Follow up in 6 months.  Will follow up and/or notify patient of  results via My Chart to determine further need for followup  Follow up office visit in one year for annual health maintenance exam, sooner PRN.

## 2022-08-29 DIAGNOSIS — G89.29 CHRONIC PAIN OF RIGHT ANKLE: ICD-10-CM

## 2022-08-29 DIAGNOSIS — M25.571 CHRONIC PAIN OF RIGHT ANKLE: ICD-10-CM

## 2022-08-31 NOTE — TELEPHONE ENCOUNTER
Prescription approved per [facility] Refill Protocol.    Simi Groves RN  [facility]

## 2022-09-02 ASSESSMENT — ENCOUNTER SYMPTOMS
POLYDIPSIA: 0
POLYPHAGIA: 0

## 2022-09-07 NOTE — RESULT ENCOUNTER NOTE
Franciscocoreen Espositouyen,    Attached are your test results.  -Normal red blood cell (hgb) levels, normal white blood cell count and normal platelet levels.  -Cholesterol levels are at your goal levels.  ADVISE: continuing your medication, a regular exercise program with at least 150 minutes of aerobic exercise per week, and eating a low saturated fat/low carbohydrate diet.  Also, you should recheck this fasting cholesterol panel in 12 months.  -A1C (test of diabetes control the last 2-3 months) is above  your goal. Please recheck your A1C test in 3 months. If still elevated at that time may need to start on diabetes medications Does not need to be fasting   -TSH (thyroid stimulating hormone) level is normal which indicates normal thyroid function.  -Ferritin is elevated. Are you taking an Iron supplement   -Microalbumin (urine protein) test is normal.  ADVISE: rechecking this annually.  Urine no infection noted    Please contact us if you have any questions.    Cherie Covington, CNP

## 2022-09-20 ENCOUNTER — ANCILLARY PROCEDURE (OUTPATIENT)
Dept: MAMMOGRAPHY | Facility: CLINIC | Age: 72
End: 2022-09-20
Attending: NURSE PRACTITIONER
Payer: MEDICARE

## 2022-09-20 DIAGNOSIS — Z12.31 ENCOUNTER FOR SCREENING MAMMOGRAM FOR BREAST CANCER: ICD-10-CM

## 2022-09-20 PROCEDURE — 77067 SCR MAMMO BI INCL CAD: CPT | Mod: GC | Performed by: RADIOLOGY

## 2022-10-20 ENCOUNTER — PATIENT OUTREACH (OUTPATIENT)
Dept: GERIATRIC MEDICINE | Facility: CLINIC | Age: 72
End: 2022-10-20

## 2022-10-20 NOTE — PROGRESS NOTES
[facility] Care Coordination Contact      [facility] Six-Month Telephone Assessment    6 month telephone assessment completed on 10/20/22 with CONNIE Ulrich and .    ER visits: No  Hospitalizations: No  TCU stays: No  Significant health status changes: no  Falls/Injuries: No  ADL/IADL changes: No  Changes in services: No    Caregiver Assessment follow up:  NA    Goals: See POC in chart for goal progress documentation.      Will see member in 6 months for an annual health risk assessment.   Encouraged member to call CC with any questions or concerns in the meantime.     Alessandra Rivas RN  [facility]  Office:[phone]  Cell Phone: [phone]

## 2023-01-14 ENCOUNTER — HEALTH MAINTENANCE LETTER (OUTPATIENT)
Age: 73
End: 2023-01-14

## 2023-01-17 ENCOUNTER — PATIENT OUTREACH (OUTPATIENT)
Dept: GERIATRIC MEDICINE | Facility: CLINIC | Age: 73
End: 2023-01-17

## 2023-01-17 NOTE — PROGRESS NOTES
Encounter opened due to Regulatory [employer] Update to open FVP Program.    Nola Pereira  Case Management Specialist  [facility]  [phone]

## 2023-01-17 NOTE — PROGRESS NOTES
Encounter opened due to Regulatory [employer] Update to close FVP Program.    Nola Pereira  Case Management Specialist  [facility]  [phone]

## 2023-02-09 ENCOUNTER — PATIENT OUTREACH (OUTPATIENT)
Dept: GERIATRIC MEDICINE | Facility: CLINIC | Age: 73
End: 2023-02-09

## 2023-02-09 ENCOUNTER — PATIENT OUTREACH (OUTPATIENT)
Dept: GERIATRIC MEDICINE | Facility: CLINIC | Age: 73
End: 2023-02-09
Payer: MEDICARE

## 2023-02-09 NOTE — PROGRESS NOTES
[facility] Care Coordination Contact    Called member with  ID #[id number] to schedule annual HRA home visit. HRA has been scheduled for 2/14/23 at 10:30am.  Called Jacinda Milner and scheduled an  for the home visit.   Denies needing services, denies falls injuries, denies changes in functions. Reports friends takes her to appointments and stores and she cooks for herself and manages her own medications.   Marifer Barboza RN  [facility]  [phone]

## 2023-02-14 ENCOUNTER — PATIENT OUTREACH (OUTPATIENT)
Dept: GERIATRIC MEDICINE | Facility: CLINIC | Age: 73
End: 2023-02-14
Payer: MEDICARE

## 2023-02-14 SDOH — ECONOMIC STABILITY: INCOME INSECURITY: IN THE LAST 12 MONTHS, WAS THERE A TIME WHEN YOU WERE NOT ABLE TO PAY THE MORTGAGE OR RENT ON TIME?: NO

## 2023-02-14 SDOH — ECONOMIC STABILITY: TRANSPORTATION INSECURITY
IN THE PAST 12 MONTHS, HAS THE LACK OF TRANSPORTATION KEPT YOU FROM MEDICAL APPOINTMENTS OR FROM GETTING MEDICATIONS?: NO

## 2023-02-14 SDOH — ECONOMIC STABILITY: FOOD INSECURITY: WITHIN THE PAST 12 MONTHS, YOU WORRIED THAT YOUR FOOD WOULD RUN OUT BEFORE YOU GOT MONEY TO BUY MORE.: NEVER TRUE

## 2023-02-14 SDOH — ECONOMIC STABILITY: FOOD INSECURITY: WITHIN THE PAST 12 MONTHS, THE FOOD YOU BOUGHT JUST DIDN'T LAST AND YOU DIDN'T HAVE MONEY TO GET MORE.: NEVER TRUE

## 2023-02-14 SDOH — HEALTH STABILITY: PHYSICAL HEALTH: ON AVERAGE, HOW MANY DAYS PER WEEK DO YOU ENGAGE IN MODERATE TO STRENUOUS EXERCISE (LIKE A BRISK WALK)?: 7 DAYS

## 2023-02-14 SDOH — HEALTH STABILITY: PHYSICAL HEALTH: ON AVERAGE, HOW MANY MINUTES DO YOU ENGAGE IN EXERCISE AT THIS LEVEL?: 10 MIN

## 2023-02-14 SDOH — ECONOMIC STABILITY: TRANSPORTATION INSECURITY
IN THE PAST 12 MONTHS, HAS LACK OF TRANSPORTATION KEPT YOU FROM MEETINGS, WORK, OR FROM GETTING THINGS NEEDED FOR DAILY LIVING?: NO

## 2023-02-14 ASSESSMENT — ACTIVITIES OF DAILY LIVING (ADL): DEPENDENT_IADLS:: TRANSPORTATION

## 2023-02-14 ASSESSMENT — SOCIAL DETERMINANTS OF HEALTH (SDOH)
WITHIN THE LAST YEAR, HAVE TO BEEN RAPED OR FORCED TO HAVE ANY KIND OF SEXUAL ACTIVITY BY YOUR PARTNER OR EX-PARTNER?: NO
HOW HARD IS IT FOR YOU TO PAY FOR THE VERY BASICS LIKE FOOD, HOUSING, MEDICAL CARE, AND HEATING?: NOT HARD AT ALL
HOW OFTEN DO YOU ATTENT MEETINGS OF THE CLUB OR ORGANIZATION YOU BELONG TO?: NEVER
DO YOU BELONG TO ANY CLUBS OR ORGANIZATIONS SUCH AS CHURCH GROUPS UNIONS, FRATERNAL OR ATHLETIC GROUPS, OR SCHOOL GROUPS?: NO
WITHIN THE LAST YEAR, HAVE YOU BEEN HUMILIATED OR EMOTIONALLY ABUSED IN OTHER WAYS BY YOUR PARTNER OR EX-PARTNER?: NO
IN A TYPICAL WEEK, HOW MANY TIMES DO YOU TALK ON THE PHONE WITH FAMILY, FRIENDS, OR NEIGHBORS?: NEVER
WITHIN THE LAST YEAR, HAVE YOU BEEN AFRAID OF YOUR PARTNER OR EX-PARTNER?: NO
HOW OFTEN DO YOU ATTEND CHURCH OR RELIGIOUS SERVICES?: NEVER
HOW OFTEN DO YOU GET TOGETHER WITH FRIENDS OR RELATIVES?: NEVER
WITHIN THE LAST YEAR, HAVE YOU BEEN KICKED, HIT, SLAPPED, OR OTHERWISE PHYSICALLY HURT BY YOUR PARTNER OR EX-PARTNER?: NO
ARE YOU MARRIED, WIDOWED, DIVORCED, SEPARATED, NEVER MARRIED, OR LIVING WITH A PARTNER?: NEVER MARRIED

## 2023-02-14 ASSESSMENT — LIFESTYLE VARIABLES
AUDIT-C TOTAL SCORE: 0
HOW OFTEN DO YOU HAVE A DRINK CONTAINING ALCOHOL: NEVER
HOW OFTEN DO YOU HAVE SIX OR MORE DRINKS ON ONE OCCASION: NEVER
HOW MANY STANDARD DRINKS CONTAINING ALCOHOL DO YOU HAVE ON A TYPICAL DAY: PATIENT DOES NOT DRINK
SKIP TO QUESTIONS 9-10: 1

## 2023-02-15 ENCOUNTER — PATIENT OUTREACH (OUTPATIENT)
Dept: GERIATRIC MEDICINE | Facility: CLINIC | Age: 73
End: 2023-02-15
Payer: MEDICARE

## 2023-02-15 NOTE — PROGRESS NOTES
[facility] Care Coordination Contact    [facility] Home Visit Assessment     Home visit for Health Risk Assessment with Rissa Acosta completed on February 14, 2023    Type of residence:: Apartment - handicap accessible  Current living arrangement:: I live in a private home with spouse (boyfriend)     Assessment completed with:: Patient    Current Care Plan  Member currently receiving the following home care services:  none   Member currently receiving the following community resources: None    Medication Review  Medication reconciliation completed in Epic: Yes  Medication set-up & administration: Independent-does not set up.  Self-administers medications.  Medication Risk Assessment Medication (1 or more, place referral to MTM): N/A: No risk factors identified  MTM Referral Placed: No: No risk factors idenified    Mental/Behavioral Health   Depression Screening:   PHQ-2 Total Score (Adult) - Positive if 3 or more points; Administer PHQ-9 if positive: 0       Mental health DX:: No        Falls Assessment:   Fallen 2 or more times in the past year?: No   Any fall with injury in the past year?: No    ADL/IADL Dependencies:   Dependent ADLs:: Independent  Dependent IADLs:: Transportation    [facility] sponsored benefits: Shared information re: Silver Sneakers/gym memberships, ASA, Calcium +D.    PCA Assessment completed at visit: Not Applicable     Elderly Waiver Eligibility: No-does not meet criteria    Care Plan & Recommendations: Member wish to remain living in the community without services. Reports independent with all cares and IADls. Member plans to get her vaccines, dental exam, colon screening, annual exam completed this year. She plans to maintain her A1C level and be free from falls.      See [facility] for detailed assessment information.    Follow-Up Plan: Member informed of future contact, plan to f/u with member with a 6 month telephone assessment.  Contact information shared with member  and family, encouraged member to call with any questions or concerns at any time.    [facility] care continuum providers: Please see Snapshot and Care Management Flowsheets for Specific details of care plan.    This CC note routed to PCP.    EOV to CMS.   Sent message to Sofía to assist with finding a dentist.  Marifer Barboza RN  [facility]  [phone]

## 2023-02-15 NOTE — PROGRESS NOTES
[facility] Care Coordination Contact  Missed called from member. Called and left voice mail via  ID VLAW.   Marifer Barboza RN  [facility]  [phone]

## 2023-02-16 ENCOUNTER — PATIENT OUTREACH (OUTPATIENT)
Dept: GERIATRIC MEDICINE | Facility: CLINIC | Age: 73
End: 2023-02-16
Payer: MEDICARE

## 2023-02-16 NOTE — LETTER
February 16, 2023    CHARLI DONOVAN LIPSCOMB  [address]   [place] [address]        Dear Charli:    At [facility], we re dedicated to improving your health and wellness. Enclosed is the Care Plan developed with you on 2/14/23. Please review the Care Plan carefully.    As a reminder, during your visit we talked about:    Ways to manage your physical and mental health    Using health care to maintain and improve your health     Your preventive care needs     Remember to contact your care coordinator if you:    Are hospitalized, or plan to be hospitalized     Have a fall      Have a change in your physical or mental health    Need help finding support or services    If you have questions, or don t agree with your Care Plan, call me at [phone]. You can also call me if your needs change. TTY users, call the [place] [employer] at (573) or 1-625.184.3011 (rcpsfe-xs-mcbwzx relay service).    Sincerely,        Genoveva Gomez RN, PHN  [phone]  [email or web address]      [id number] accepted    [id number] (07/2022)

## 2023-02-16 NOTE — LETTER
February 16, 2023    CHARLI DONOVAN LIPSCOMB  [address]   [place] [address]        Dear Charli:    At [facility], we re dedicated to improving your health and wellness. Enclosed is the Care Plan developed with you on 2/14/23. Please review the Care Plan carefully.    As a reminder, during your visit we talked about:    Ways to manage your physical and mental health    Using health care to maintain and improve your health     Your preventive care needs     Remember to contact your care coordinator if you:    Are hospitalized, or plan to be hospitalized     Have a fall      Have a change in your physical or mental health    Need help finding support or services    If you have questions, or don t agree with your Care Plan, call me at [phone]. You can also call me if your needs change. TTY users, call the [place] [employer] at (253) or 1-735.409.3898 (qtspab-ua-lyjswq relay service).    Sincerely,        Marifer Barboza RN, BSN, PHN  [phone]  [email or web address]    [id number] accepted    [id number] (07/2022)

## 2023-02-16 NOTE — PROGRESS NOTES
[facility] Care Coordination Contact    Received after visit chart from care coordinator.  Completed following tasks: Mailed copy of care plan to client and Mailed Safe Medication Disposal      Nola Pereira  Case Management Specialist  [facility]  [phone]

## 2023-02-27 ENCOUNTER — PATIENT OUTREACH (OUTPATIENT)
Dept: GERIATRIC MEDICINE | Facility: CLINIC | Age: 73
End: 2023-02-27
Payer: MEDICARE

## 2023-02-27 NOTE — PROGRESS NOTES
[facility] Care Coordination Contact     CHW, per care coordinator Marifer, followed up w/ mbr to schedule dental appt. CHW, offered to schedule mbr appt noted she would have friend assist her w/scheduling. Provided Mbr  [facility] [phone].    CHW left contact information in the event mbr had  more questions.     FAITH Hampton  [facility]  [phone]

## 2023-03-07 DIAGNOSIS — K21.9 GASTROESOPHAGEAL REFLUX DISEASE WITHOUT ESOPHAGITIS: ICD-10-CM

## 2023-03-07 DIAGNOSIS — M25.571 CHRONIC PAIN OF RIGHT ANKLE: ICD-10-CM

## 2023-03-07 DIAGNOSIS — G89.29 CHRONIC PAIN OF RIGHT ANKLE: ICD-10-CM

## 2023-03-07 RX ORDER — MULTIVITAMIN/IRON/FOLIC ACID 18MG-0.4MG
TABLET ORAL
Qty: 30 TABLET | Refills: 1 | Status: SHIPPED | OUTPATIENT
Start: 2023-03-07 | End: 2024-03-11

## 2023-03-14 NOTE — PROGRESS NOTES
A user error has taken place: encounter opened in error, closed for administrative reasons.  Marifer Barboza RN  [facility]  [phone]

## 2023-04-23 ENCOUNTER — HEALTH MAINTENANCE LETTER (OUTPATIENT)
Age: 73
End: 2023-04-23

## 2023-04-27 DIAGNOSIS — E78.5 HYPERLIPIDEMIA LDL GOAL <100: ICD-10-CM

## 2023-04-27 RX ORDER — PRAVASTATIN SODIUM 20 MG
TABLET ORAL
Qty: 90 TABLET | Refills: 1 | Status: SHIPPED | OUTPATIENT
Start: 2023-04-27 | End: 2023-08-29

## 2023-05-22 DIAGNOSIS — G89.29 CHRONIC PAIN OF RIGHT ANKLE: ICD-10-CM

## 2023-05-22 DIAGNOSIS — M25.571 CHRONIC PAIN OF RIGHT ANKLE: ICD-10-CM

## 2023-07-16 DIAGNOSIS — M25.571 CHRONIC PAIN OF RIGHT ANKLE: ICD-10-CM

## 2023-07-16 DIAGNOSIS — G89.29 CHRONIC PAIN OF RIGHT ANKLE: ICD-10-CM

## 2023-07-27 ENCOUNTER — PATIENT OUTREACH (OUTPATIENT)
Dept: CARE COORDINATION | Facility: CLINIC | Age: 73
End: 2023-07-27
Payer: MEDICARE

## 2023-08-10 ENCOUNTER — PATIENT OUTREACH (OUTPATIENT)
Dept: CARE COORDINATION | Facility: CLINIC | Age: 73
End: 2023-08-10
Payer: MEDICARE

## 2023-08-22 ENCOUNTER — LAB (OUTPATIENT)
Dept: LAB | Facility: CLINIC | Age: 73
End: 2023-08-22
Payer: MEDICARE

## 2023-08-22 DIAGNOSIS — Z13.0 SCREENING FOR DISORDER OF BLOOD AND BLOOD-FORMING ORGANS: ICD-10-CM

## 2023-08-22 DIAGNOSIS — E78.5 HYPERLIPIDEMIA LDL GOAL <100: ICD-10-CM

## 2023-08-22 DIAGNOSIS — Z13.29 SCREENING FOR THYROID DISORDER: ICD-10-CM

## 2023-08-22 DIAGNOSIS — E11.9 TYPE 2 DIABETES MELLITUS WITHOUT COMPLICATION, WITHOUT LONG-TERM CURRENT USE OF INSULIN (H): ICD-10-CM

## 2023-08-22 LAB
ALBUMIN SERPL BCG-MCNC: 4.3 G/DL (ref 3.5–5.2)
ALP SERPL-CCNC: 109 U/L (ref 35–104)
ALT SERPL W P-5'-P-CCNC: 72 U/L (ref 0–50)
ANION GAP SERPL CALCULATED.3IONS-SCNC: 10 MMOL/L (ref 7–15)
AST SERPL W P-5'-P-CCNC: 66 U/L (ref 0–45)
BILIRUB SERPL-MCNC: 0.7 MG/DL
BUN SERPL-MCNC: 17.1 MG/DL (ref 8–23)
CALCIUM SERPL-MCNC: 8.9 MG/DL (ref 8.8–10.2)
CHLORIDE SERPL-SCNC: 105 MMOL/L (ref 98–107)
CHOLEST SERPL-MCNC: 172 MG/DL
CREAT SERPL-MCNC: 0.61 MG/DL (ref 0.51–0.95)
CREAT UR-MCNC: 52.5 MG/DL
DEPRECATED HCO3 PLAS-SCNC: 24 MMOL/L (ref 22–29)
ERYTHROCYTE [DISTWIDTH] IN BLOOD BY AUTOMATED COUNT: 13.8 % (ref 10–15)
GFR SERPL CREATININE-BSD FRML MDRD: >90 ML/MIN/1.73M2
GLUCOSE SERPL-MCNC: 169 MG/DL (ref 70–99)
HBA1C MFR BLD: 8.6 % (ref 0–5.6)
HCT VFR BLD AUTO: 40.5 % (ref 35–47)
HDLC SERPL-MCNC: 53 MG/DL
HGB BLD-MCNC: 13.4 G/DL (ref 11.7–15.7)
LDLC SERPL CALC-MCNC: 100 MG/DL
MCH RBC QN AUTO: 26.6 PG (ref 26.5–33)
MCHC RBC AUTO-ENTMCNC: 33.1 G/DL (ref 31.5–36.5)
MCV RBC AUTO: 81 FL (ref 78–100)
MICROALBUMIN UR-MCNC: <12 MG/L
MICROALBUMIN/CREAT UR: NORMAL MG/G{CREAT}
NONHDLC SERPL-MCNC: 119 MG/DL
PLATELET # BLD AUTO: 235 10E3/UL (ref 150–450)
POTASSIUM SERPL-SCNC: 4.4 MMOL/L (ref 3.4–5.3)
PROT SERPL-MCNC: 7.9 G/DL (ref 6.4–8.3)
RBC # BLD AUTO: 5.03 10E6/UL (ref 3.8–5.2)
SODIUM SERPL-SCNC: 139 MMOL/L (ref 136–145)
TRIGL SERPL-MCNC: 93 MG/DL
TSH SERPL DL<=0.005 MIU/L-ACNC: 0.63 UIU/ML (ref 0.3–4.2)
WBC # BLD AUTO: 6.5 10E3/UL (ref 4–11)

## 2023-08-22 PROCEDURE — 36415 COLL VENOUS BLD VENIPUNCTURE: CPT

## 2023-08-22 PROCEDURE — 85027 COMPLETE CBC AUTOMATED: CPT

## 2023-08-22 PROCEDURE — 82570 ASSAY OF URINE CREATININE: CPT

## 2023-08-22 PROCEDURE — 80053 COMPREHEN METABOLIC PANEL: CPT

## 2023-08-22 PROCEDURE — 84443 ASSAY THYROID STIM HORMONE: CPT

## 2023-08-22 PROCEDURE — 83036 HEMOGLOBIN GLYCOSYLATED A1C: CPT

## 2023-08-22 PROCEDURE — 82043 UR ALBUMIN QUANTITATIVE: CPT

## 2023-08-22 PROCEDURE — 80061 LIPID PANEL: CPT

## 2023-08-29 ENCOUNTER — PATIENT OUTREACH (OUTPATIENT)
Dept: CARE COORDINATION | Facility: CLINIC | Age: 73
End: 2023-08-29

## 2023-08-29 ENCOUNTER — OFFICE VISIT (OUTPATIENT)
Dept: FAMILY MEDICINE | Facility: CLINIC | Age: 73
End: 2023-08-29
Payer: MEDICARE

## 2023-08-29 VITALS
HEIGHT: 60 IN | WEIGHT: 140.7 LBS | TEMPERATURE: 98.2 F | DIASTOLIC BLOOD PRESSURE: 82 MMHG | SYSTOLIC BLOOD PRESSURE: 126 MMHG | RESPIRATION RATE: 16 BRPM | OXYGEN SATURATION: 96 % | BODY MASS INDEX: 27.62 KG/M2 | HEART RATE: 70 BPM

## 2023-08-29 DIAGNOSIS — M25.571 RIGHT ANKLE PAIN, UNSPECIFIED CHRONICITY: ICD-10-CM

## 2023-08-29 DIAGNOSIS — R10.11 ABDOMINAL PAIN, RIGHT UPPER QUADRANT: ICD-10-CM

## 2023-08-29 DIAGNOSIS — R79.89 ELEVATED LFTS: ICD-10-CM

## 2023-08-29 DIAGNOSIS — Z00.00 ENCOUNTER FOR MEDICARE ANNUAL WELLNESS EXAM: Primary | ICD-10-CM

## 2023-08-29 DIAGNOSIS — B18.1 VIRAL HEPATITIS B CHRONIC (H): ICD-10-CM

## 2023-08-29 DIAGNOSIS — E78.5 HYPERLIPIDEMIA LDL GOAL <100: ICD-10-CM

## 2023-08-29 DIAGNOSIS — E11.65 TYPE 2 DIABETES MELLITUS WITH HYPERGLYCEMIA, WITHOUT LONG-TERM CURRENT USE OF INSULIN (H): ICD-10-CM

## 2023-08-29 DIAGNOSIS — Z12.31 ENCOUNTER FOR SCREENING MAMMOGRAM FOR BREAST CANCER: ICD-10-CM

## 2023-08-29 LAB
ALBUMIN SERPL BCG-MCNC: 4.4 G/DL (ref 3.5–5.2)
ALP SERPL-CCNC: 124 U/L (ref 35–104)
ALT SERPL W P-5'-P-CCNC: 70 U/L (ref 0–50)
AST SERPL W P-5'-P-CCNC: 60 U/L (ref 0–45)
BILIRUB DIRECT SERPL-MCNC: <0.2 MG/DL (ref 0–0.3)
BILIRUB SERPL-MCNC: 0.7 MG/DL
PROT SERPL-MCNC: 8.2 G/DL (ref 6.4–8.3)

## 2023-08-29 PROCEDURE — 86704 HEP B CORE ANTIBODY TOTAL: CPT | Performed by: NURSE PRACTITIONER

## 2023-08-29 PROCEDURE — 99214 OFFICE O/P EST MOD 30 MIN: CPT | Mod: 25 | Performed by: NURSE PRACTITIONER

## 2023-08-29 PROCEDURE — 86706 HEP B SURFACE ANTIBODY: CPT | Performed by: NURSE PRACTITIONER

## 2023-08-29 PROCEDURE — G0439 PPPS, SUBSEQ VISIT: HCPCS | Performed by: NURSE PRACTITIONER

## 2023-08-29 PROCEDURE — 87340 HEPATITIS B SURFACE AG IA: CPT | Performed by: NURSE PRACTITIONER

## 2023-08-29 PROCEDURE — 86708 HEPATITIS A ANTIBODY: CPT | Performed by: NURSE PRACTITIONER

## 2023-08-29 PROCEDURE — 80076 HEPATIC FUNCTION PANEL: CPT | Performed by: NURSE PRACTITIONER

## 2023-08-29 PROCEDURE — 36415 COLL VENOUS BLD VENIPUNCTURE: CPT | Performed by: NURSE PRACTITIONER

## 2023-08-29 PROCEDURE — 86803 HEPATITIS C AB TEST: CPT | Performed by: NURSE PRACTITIONER

## 2023-08-29 RX ORDER — PRAVASTATIN SODIUM 20 MG
20 TABLET ORAL DAILY
Qty: 90 TABLET | Refills: 3 | Status: SHIPPED | OUTPATIENT
Start: 2023-08-29 | End: 2024-09-05

## 2023-08-29 RX ORDER — NAPROXEN 500 MG/1
500 TABLET ORAL 2 TIMES DAILY PRN
Qty: 30 TABLET | Refills: 1 | Status: SHIPPED | OUTPATIENT
Start: 2023-08-29 | End: 2023-11-06

## 2023-08-29 RX ORDER — METFORMIN HCL 500 MG
500 TABLET, EXTENDED RELEASE 24 HR ORAL
Qty: 90 TABLET | Refills: 0 | Status: SHIPPED | OUTPATIENT
Start: 2023-08-29 | End: 2023-11-24

## 2023-08-29 ASSESSMENT — ENCOUNTER SYMPTOMS: BREAST MASS: 0

## 2023-08-29 ASSESSMENT — ACTIVITIES OF DAILY LIVING (ADL): CURRENT_FUNCTION: NO ASSISTANCE NEEDED

## 2023-08-29 ASSESSMENT — PAIN SCALES - GENERAL: PAINLEVEL: EXTREME PAIN (8)

## 2023-08-29 NOTE — PATIENT INSTRUCTIONS
PLAN:   1.   Symptomatic therapy suggested: will start on Metformin once a day   Can take naprosyn as needed   2.  Orders Placed This Encounter   Medications    naproxen (NAPROSYN) 500 MG tablet     Sig: Take 1 tablet (500 mg) by mouth 2 times daily as needed     Dispense:  30 tablet     Refill:  1    metFORMIN (GLUCOPHAGE XR) 500 MG 24 hr tablet     Sig: Take 1 tablet (500 mg) by mouth daily (with dinner)     Dispense:  90 tablet     Refill:  0    pravastatin (PRAVACHOL) 20 MG tablet     Sig: Take 1 tablet (20 mg) by mouth daily     Dispense:  90 tablet     Refill:  3     Orders Placed This Encounter   Procedures    REVIEW OF HEALTH MAINTENANCE PROTOCOL ORDERS    *MA Screening Digital Bilateral    US Abdomen Limited    Basic metabolic panel    Hemoglobin A1c    Hepatic function panel    Hepatitis A Antibody IgG    Hepatitis B Surface Antibody    HEPATITIS B CORE ANTIBODY    HEPATITIS B SURFACE ANTIGEN    HEPATITS C ANTIBODY    Orthopedic  Referral    Cane Order for DME - ONLY FOR DME     3.  FUTURE LABS:       - Schedule non-fasting labs in 3 months  Abdomen ultrasound   Will follow up and/or notify patient of  results via My Chart to determine further need for followup  Refer to orthopedics    Patient needs to follow up in if no improvement,or sooner if worsening of symptoms or other symptoms develop.            Patient Education   Personalized Prevention Plan  You are due for the preventive services outlined below.  Your care team is available to assist you in scheduling these services.  If you have already completed any of these items, please share that information with your care team to update in your medical record.  Health Maintenance Due   Topic Date Due    Eye Exam  04/29/2020    COVID-19 Vaccine (6 - Moderna series) 02/06/2023    ANNUAL REVIEW OF HM ORDERS  08/26/2023    Annual Wellness Visit  08/26/2023    Flu Vaccine (1) 09/01/2023    Diabetic Foot Exam  09/02/2023

## 2023-08-29 NOTE — PROGRESS NOTES
"SUBJECTIVE:   Rissa is a 73 year old who presents for Preventive Visit.      8/29/2023    11:16 AM   Additional Questions   Roomed by Jacey CROCKETT   Accompanied by friend       Are you in the first 12 months of your Medicare coverage?  No    Healthy Habits:     In general, how would you rate your overall health?  Good    Frequency of exercise:  4-5 days/week    Duration of exercise:  15-30 minutes    Do you usually eat at least 4 servings of fruit and vegetables a day, include whole grains    & fiber and avoid regularly eating high fat or \"junk\" foods?  Yes    Taking medications regularly:  Yes    Medication side effects:  None    Ability to successfully perform activities of daily living:  No assistance needed    Home Safety:  No safety concerns identified    Hearing Impairment:  No hearing concerns    In the past 6 months, have you been bothered by leaking of urine?  No    In general, how would you rate your overall mental or emotional health?  Good    Additional concerns today:  No    Have you ever done Advance Care Planning? (For example, a Health Directive, POLST, or a discussion with a medical provider or your loved ones about your wishes): No, advance care planning information given to patient to review.  Patient declined advance care planning discussion at this time.       Fall risk  Fallen 2 or more times in the past year?: No  Any fall with injury in the past year?: No    Cognitive Screening   1) Repeat 3 items (Leader, Season, Table)    2) Clock draw: NORMAL  3) 3 item recall: Recalls 3 objects  Results: 3 items recalled: COGNITIVE IMPAIRMENT LESS LIKELY    Mini-CogTM Copyright JEFF Mcelroy. Licensed by the author for use in [facility]; reprinted with permission (yelena@.[email or web address]). All rights reserved.      Do you have sleep apnea, excessive snoring or daytime drowsiness? : no    Reviewed and updated as needed this visit by clinical staff                  Reviewed and updated as needed this visit by " Provider                 Social History     Tobacco Use    Smoking status: Never    Smokeless tobacco: Never   Substance Use Topics    Alcohol use: No           8/29/2023    11:06 AM   Alcohol Use   Prescreen: >3 drinks/day or >7 drinks/week? No     Do you have a current opioid prescription? No  Do you use any other controlled substances or medications that are not prescribed by a provider? None    Diabetes Follow-up    How often are you checking your blood sugar? One time daily  What time of day are you checking your blood sugars (select all that apply)?  Before meals  Have you had any blood sugars above 200?  No  Have you had any blood sugars below 70?  No  What symptoms do you notice when your blood sugar is low?  None  What concerns do you have today about your diabetes? None   Do you have any of these symptoms? (Select all that apply)  No numbness or tingling in feet.  No redness, sores or blisters on feet.  No complaints of excessive thirst.  No reports of blurry vision.  No significant changes to weight.  Have you had a diabetic eye exam in the last 12 months? Yes          Hyperlipidemia Follow-Up    Are you regularly taking any medication or supplement to lower your cholesterol?   Yes-   Are you having muscle aches or other side effects that you think could be caused by your cholesterol lowering medication?  No    Hypertension Follow-up    Do you check your blood pressure regularly outside of the clinic? No   Are you following a low salt diet? Yes  Are your blood pressures ever more than 140 on the top number (systolic) OR more   than 90 on the bottom number (diastolic), for example 140/90? No    BP Readings from Last 2 Encounters:   08/29/23 (!) 146/83   08/22/23 119/69     Hemoglobin A1C (%)   Date Value   08/22/2023 8.6 (H)   08/26/2022 7.4 (H)   05/11/2021 6.9 (H)   02/11/2020 6.8 (H)     LDL Cholesterol Calculated (mg/dL)   Date Value   08/22/2023 100   08/26/2022 95   05/11/2021 109 (H)   02/11/2020 86        Current providers sharing in care for this patient include:   Patient Care Team:  Cherie Covington APRN CNP as PCP - General (Family Practice)  Kalia Reddy MD as MD (Ophthalmology)  Mojgan Dave MD (Ophthalmology)  Millie Fuentes Chi, OD as MD (Optometry)  Cherie Covington APRN CNP as Assigned PCP  Marifer Barboza, RN as Lead Care Coordinator (Primary Care - CC)    The following health maintenance items are reviewed in Epic and correct as of today:  Health Maintenance   Topic Date Due    HEPATITIS A IMMUNIZATION (1 of 2 - Risk 2-dose series) Never done    HEPATITIS B IMMUNIZATION (1 of 3 - Risk 3-dose series) Never done    EYE EXAM  04/29/2020    COVID-19 Vaccine (6 - Moderna series) 02/06/2023    INFLUENZA VACCINE (1) 09/01/2023    DIABETIC FOOT EXAM  09/02/2023    MEDICARE ANNUAL WELLNESS VISIT  08/26/2023    MAMMO SCREENING  09/20/2023    A1C  02/22/2024    BMP  08/22/2024    LIPID  08/22/2024    MICROALBUMIN  08/22/2024    ANNUAL REVIEW OF HM ORDERS  08/29/2024    FALL RISK ASSESSMENT  08/29/2024    DTAP/TDAP/TD IMMUNIZATION (3 - Td or Tdap) 12/10/2025    ADVANCE CARE PLANNING  08/29/2028    COLORECTAL CANCER SCREENING  03/03/2030    DEXA  02/18/2035    HEPATITIS C SCREENING  Completed    PHQ-2 (once per calendar year)  Completed    Pneumococcal Vaccine: 65+ Years  Completed    ZOSTER IMMUNIZATION  Completed    IPV IMMUNIZATION  Aged Out    HPV IMMUNIZATION  Aged Out    MENINGITIS IMMUNIZATION  Aged Out     Labs reviewed in EPIC  BP Readings from Last 3 Encounters:   08/29/23 126/82   08/22/23 119/69   08/26/22 136/84    Wt Readings from Last 3 Encounters:   08/29/23 63.8 kg (140 lb 11.2 oz)   08/22/23 64.2 kg (141 lb 9.6 oz)   08/26/22 64.2 kg (141 lb 8 oz)                  Patient Active Problem List   Diagnosis    CARDIOVASCULAR SCREENING; LDL GOAL LESS THAN 160    DDD (degenerative disc disease), cervical    DDD (degenerative disc disease), lumbar    Vitamin D insufficiency     Elevated LFTs    Positive PPD    Bilateral low back pain without sciatica    Lumbar radiculopathy    Advanced directives, counseling/discussion    Closed fracture of lumbar vertebra (H)    Spinal stenosis of lumbar region    Viral hepatitis    Type 2 diabetes mellitus with hyperglycemia, without long-term current use of insulin (H)    Osteopenia    Neuropathic pain, leg    Exposure to hepatitis B    Spinal stenosis of lumbar region without neurogenic claudication    Osteoporosis    Hyperlipidemia LDL goal <100     Past Surgical History:   Procedure Laterality Date    BACK SURGERY  2011    [facility]    CATARACT IOL, RT/LT Right 2005    COLONOSCOPY N/A 3/3/2020    Procedure: Colonoscopy, With Polypectomy And Biopsy;  Surgeon: Arlet Zaragoza DO;  Location: [facility] OR    COLONOSCOPY WITH CO2 INSUFFLATION N/A 3/3/2020    Procedure: COLONOSCOPY, WITH CO2 INSUFFLATION;  Surgeon: Arlet Zaragoza DO;  Location: [facility] OR       Social History     Tobacco Use    Smoking status: Never    Smokeless tobacco: Never   Substance Use Topics    Alcohol use: No     Family History   Problem Relation Age of Onset    Diabetes No family hx of     Macular Degeneration No family hx of     Glaucoma No family hx of     Strabismus No family hx of          Current Outpatient Medications   Medication Sig Dispense Refill    ACCU-CHEK GUIDE test strip USE TO TEST BLOOD SUGAR 1 TIMES DAILY OR AS DIRECTED. 100 strip 0    ascorbic acid (VITAMIN C) 500 MG tablet Take by mouth daily      B Complex Vitamins (VITAMIN-B COMPLEX PO)       blood glucose (BETSY CONTOUR NEXT) test strip Use to test blood sugar 2 times daily or as directed. 100 strip 3    blood glucose (NO BRAND SPECIFIED) test strip Use to test blood sugar  1 to 2  times daily or as directed. To accompany: Blood Glucose Monitor Brands: per insurance. 100 strip 11    blood glucose calibration (NO BRAND SPECIFIED) solution To accompany: Blood Glucose Monitor Brands: per insurance. 1  Bottle 3    blood glucose monitoring (ACCU-CHEK FASTCLIX) lancets 1 each by In Vitro route daily Use to test blood sugar 1 times daily or as directed. 100 each 11    blood glucose monitoring (BETSY MICROLET) lancets USE TO TEST 1 TO 2 TIMES DAILY, AND AS NEEDED 200 each 3    blood glucose monitoring (NO BRAND SPECIFIED) meter device kit Use to test blood sugar 1 to 2  times daily or as directed. Preferred blood glucose meter OR supplies to accompany: Blood Glucose Monitor Brands: per insurance. 1 kit 0    blood glucose monitoring (ONETOUCH VERIO) meter device kit USE TO TEST BLOOD SUGAR 2 TIMES DAILY OR AS DIRECTED. 1 kit 0    calcium carb-cholecalciferol 600-500 MG-UNIT CAPS TAKE 1 CAPSULE BY MOUTH TWICE A  capsule 3    Calcium Carbonate-Vitamin D (CALCIUM-VITAMIN D) 600-125 MG-UNIT TABS TAKE 1 CAPSULE BY MOUTH TWICE A DAY      [facility] OMEPRAZOLE 20 MG tablet TAKE 1 TABLET BY MOUTH EVERY DAY 30 tablet 1    diclofenac (VOLTAREN) 1 % topical gel APPLY 2 GRAMS TOPICALLY 4 TIMES DAILY 200 g 1    ibuprofen (ADVIL/MOTRIN) 600 MG tablet TAKE 1 TABLET BY MOUTH EVERY 12 HOURS AS NEEDED FOR MODERATE PAIN 180 tablet 0    metFORMIN (GLUCOPHAGE XR) 500 MG 24 hr tablet Take 1 tablet (500 mg) by mouth daily (with dinner) 90 tablet 0    Multiple Vitamins-Minerals (CHOICEFUL MULTIVITAMIN PO)       naproxen (NAPROSYN) 500 MG tablet Take 1 tablet (500 mg) by mouth 2 times daily as needed 30 tablet 1    Omega-3 Fatty Acids (FISH OIL CONCENTRATE PO)       Liquid XUCH VERIO IQ test strip USE TO TEST BLOOD SUGARS 2 TIMES DAILY OR AS DIRECTED 100 strip 3    ORDER FOR DME Equipment being ordered: walker  Wheeled and seated if needed 1 each 0    ORDER FOR DME Equipment being ordered: TENS 1 Units 0    pravastatin (PRAVACHOL) 20 MG tablet Take 1 tablet (20 mg) by mouth daily 90 tablet 3     No Known Allergies  Recent Labs   Lab Test 08/29/23  1227 08/22/23  0717 08/26/22  0800 05/11/21  1110 02/11/20  0819   A1C  --  8.6* 7.4* 6.9* 6.8*  "  LDL  --  100 95 109* 86   HDL  --  53 52 60 57   TRIG  --  93 115 84 117   ALT 70* 72*  --  40 58*   CR  --  0.61  --  0.67 0.59   GFRESTIMATED  --  >90  --  88 >90   GFRESTBLACK  --   --   --  >90 >90   POTASSIUM  --  4.4  --  4.1 4.5   TSH  --  0.63 0.92  --  0.60        Mammogram Screening: Mammogram Screening: Recommended mammography every 1-2 years with patient discussion and risk factor consideration    Review of Systems   Breasts:  Negative for tenderness, breast mass and discharge.   Genitourinary:  Negative for pelvic pain, vaginal bleeding and vaginal discharge.     CONSTITUTIONAL:NEGATIVE for fever, chills, change in weight  INTEGUMENTARY/SKIN: NEGATIVE for worrisome rashes, moles or lesions  EYES: NEGATIVE for vision changes or irritation  ENT: NEGATIVE for ear, mouth and throat problems  RESP:NEGATIVE for significant cough or SOB  BREAST: NEGATIVE for masses, tenderness or discharge  CV: NEGATIVE for chest pain, palpitations or peripheral edema  GI: POSITIVE for abdominal pain RUQ intermittently will maybe be once a week and fleeting  No heartburn.  No constipation and NEGATIVE for jaundice, melena, nausea, poor appetite, and vomiting   menopausal female: amenorrhea, no unusual urinary symptoms, and no unusual vaginal symptoms  MUSCULOSKELETAL:POSITIVE  for joint pain right ankle pain.  and NEGATIVE for joint swelling  and joint warmth   NEURO: NEGATIVE for weakness, dizziness or paresthesias  ENDOCRINE: POSITIVE  for HX diabetes and NEGATIVE for paresthesias, polydipsia, polyphagia, and polyuria  HEME/ALLERGY/IMMUNE: NEGATIVE for bleeding problems  PSYCHIATRIC: NEGATIVE for changes in mood or affect       OBJECTIVE:   /82 (BP Location: Right arm, Patient Position: Sitting)   Pulse 70   Temp 98.2  F (36.8  C) (Oral)   Resp 16   Ht 1.53 m (5' 0.25\")   Wt 63.8 kg (140 lb 11.2 oz)   SpO2 96%   BMI 27.25 kg/m   Estimated body mass index is 27.65 kg/m  as calculated from the following:    " Height as of 8/22/23: 1.524 m (5').    Weight as of 8/22/23: 64.2 kg (141 lb 9.6 oz).  Wt Readings from Last 4 Encounters:   08/29/23 63.8 kg (140 lb 11.2 oz)   08/22/23 64.2 kg (141 lb 9.6 oz)   08/26/22 64.2 kg (141 lb 8 oz)   05/11/21 63.5 kg (140 lb)      Physical Exam  Musculoskeletal:      Right foot: Tenderness present.       GENERAL APPEARANCE: healthy, alert and no distress  EYES: Eyes grossly normal to inspection and conjunctivae and sclerae normal  HENT: ear canals and TM's normal, nose and mouth without ulcers or lesions, oropharynx clear and oral mucous membranes moist  NECK: no adenopathy, no asymmetry, masses, or scars and thyroid normal to palpation  RESP: lungs clear to auscultation - no rales, rhonchi or wheezes  BREAST: no palpable axillary masses or adenopathy  CV: regular rates and rhythm, no murmur, click or rub, no peripheral edema, and peripheral pulses strong  ABDOMEN: soft, nontender, no hepatosplenomegaly, no masses and bowel sounds normal  MS: no musculoskeletal defects are noted and gait is age appropriate without ataxia  Foot/Ankle Musculoskeletal Exam  Gait    Antalgic: right    Inspection    Leg length disparity: no discrepancy    Right      Erythema: none      Edema: mild        Ecchymosis: none        Deformity: mild        Previous foot incision: no previous incision          Previous ankle incision: no previous incision    Left      Erythema: none        Previous foot incision: no previous incision      Previous ankle incision: no previous incision    Palpation    Right      Increased warmth: none        Masses: none        Crepitus: mild        Tenderness: present       SKIN: no suspicious lesions or rashes  NEURO: Normal strength and tone, sensory exam grossly normal, mentation intact and speech normal  PSYCH: mentation appears normal and affect normal/bright    Diagnostic Test Results:   Diagnostic Test Results:  Labs reviewed in Epic  Lab on 08/22/2023   Component Date Value  Ref Range Status    Cholesterol 08/22/2023 172  <200 mg/dL Final    Triglycerides 08/22/2023 93  <150 mg/dL Final    Direct Measure HDL 08/22/2023 53  >=50 mg/dL Final    LDL Cholesterol Calculated 08/22/2023 100  <=100 mg/dL Final    Non HDL Cholesterol 08/22/2023 119  <130 mg/dL Final    WBC Count 08/22/2023 6.5  4.0 - 11.0 10e3/uL Final    RBC Count 08/22/2023 5.03  3.80 - 5.20 10e6/uL Final    Hemoglobin 08/22/2023 13.4  11.7 - 15.7 g/dL Final    Hematocrit 08/22/2023 40.5  35.0 - 47.0 % Final    MCV 08/22/2023 81  78 - 100 fL Final    MCH 08/22/2023 26.6  26.5 - 33.0 pg Final    MCHC 08/22/2023 33.1  31.5 - 36.5 g/dL Final    RDW 08/22/2023 13.8  10.0 - 15.0 % Final    Platelet Count 08/22/2023 235  150 - 450 10e3/uL Final    Sodium 08/22/2023 139  136 - 145 mmol/L Final    Potassium 08/22/2023 4.4  3.4 - 5.3 mmol/L Final    Chloride 08/22/2023 105  98 - 107 mmol/L Final    Carbon Dioxide (CO2) 08/22/2023 24  22 - 29 mmol/L Final    Anion Gap 08/22/2023 10  7 - 15 mmol/L Final    Urea Nitrogen 08/22/2023 17.1  8.0 - 23.0 mg/dL Final    Creatinine 08/22/2023 0.61  0.51 - 0.95 mg/dL Final    Calcium 08/22/2023 8.9  8.8 - 10.2 mg/dL Final    Glucose 08/22/2023 169 (H)  70 - 99 mg/dL Final    Alkaline Phosphatase 08/22/2023 109 (H)  35 - 104 U/L Final    AST 08/22/2023 66 (H)  0 - 45 U/L Final    Reference intervals for this test were updated on 6/12/2023 to more accurately reflect our healthy population. There may be differences in the flagging of prior results with similar values performed with this method. Interpretation of those prior results can be made in the context of the updated reference intervals.    ALT 08/22/2023 72 (H)  0 - 50 U/L Final    Reference intervals for this test were updated on 6/12/2023 to more accurately reflect our healthy population. There may be differences in the flagging of prior results with similar values performed with this method. Interpretation of those prior results can be made  in the context of the updated reference intervals.      Protein Total 08/22/2023 7.9  6.4 - 8.3 g/dL Final    Albumin 08/22/2023 4.3  3.5 - 5.2 g/dL Final    Bilirubin Total 08/22/2023 0.7  <=1.2 mg/dL Final    GFR Estimate 08/22/2023 >90  >60 mL/min/1.73m2 Final    TSH 08/22/2023 0.63  0.30 - 4.20 uIU/mL Final    Hemoglobin A1C 08/22/2023 8.6 (H)  0.0 - 5.6 % Final    Creatinine Urine mg/dL 08/22/2023 52.5  mg/dL Final    The reference ranges have not been established in urine creatinine. The results should be integrated into the clinical context for interpretation.    Albumin Urine mg/L 08/22/2023 <12.0  mg/L Final    The reference ranges have not been established in urine albumin. The results should be integrated into the clinical context for interpretation.    Albumin Urine mg/g Cr 08/22/2023    Final    Unable to calculate, urine albumin and/or urine creatinine is outside detectable limits.  Microalbuminuria is defined as an albumin:creatinine ratio of 17 to 299 for males and 25 to 299 for females. A ratio of albumin:creatinine of 300 or higher is indicative of overt proteinuria.  Due to biologic variability, positive results should be confirmed by a second, first-morning random or 24-hour timed urine specimen. If there is discrepancy, a third specimen is recommended. When 2 out of 3 results are in the microalbuminuria range, this is evidence for incipient nephropathy and warrants increased efforts at glucose control, blood pressure control, and institution of therapy with an angiotensin-converting-enzyme (ACE) inhibitor (if the patient can tolerate it).       Recent Results (from the past 744 hour(s))   US Abdomen Limited    Narrative    EXAMINATION: US ABDOMEN LIMITED, 8/30/2023 8:02 AM     COMPARISON: None pertinent    HISTORY:  Elevated LFTs; Abdominal pain, right upper quadrant    TECHNIQUE: The abdomen was scanned in standard fashion with  specialized ultrasound transducer(s) using both gray-scale and  limited  color Doppler techniques.    FINDINGS:  Liver: Diffuse increased echogenicity. No focal solid mass.The main  portal vein is patent with antegrade flow, measuring 0.8 cm. The liver  measures 13.5 cm in sagittal dimension.  Gallbladder: No wall thickening, pericholecystic fluid, positive  sonographic Ponce's sign. No cholelithiasis.  Bile Ducts: Both the intra- and extrahepatic biliary system are of  normal caliber.  The common bile duct measures 3 mm mm in diameter.  Pancreas: Visualized portions of the head and body of the pancreas are  unremarkable.   Kidneys: No hydronephrosis.  The craniocaudal dimension: right- 12.1  cm,.      Impression    IMPRESSION:   1.  Increased diffuse liver echogenicity, indicative of intrinsic  hepatic disease, most commonly fatty liver.    MITA MORE MD         SYSTEM ID:  [id number]        ASSESSMENT / PLAN:   Rissa was seen today for medicare visit and med change request.    Diagnoses and all orders for this visit:    Encounter for Medicare annual wellness exam  -     REVIEW OF HEALTH MAINTENANCE PROTOCOL ORDERS  -     PRIMARY CARE FOLLOW-UP SCHEDULING; Future    Type 2 diabetes mellitus with hyperglycemia, without long-term current use of insulin (H)  -     Basic metabolic panel; Future  -     Hemoglobin A1c; Future  -     metFORMIN (GLUCOPHAGE XR) 500 MG 24 hr tablet; Take 1 tablet (500 mg) by mouth daily (with dinner)  annual eye examinations at Ophthalmology discussed, glycohemoglobin monitoring discussed, and long term diabetic complications discussed  Attempt to improve diet  Weight loss advised  Increased exercise advised   Recheck in 3 months, sooner should new symptoms or   problems arise.    Hyperlipidemia LDL goal <100  -     pravastatin (PRAVACHOL) 20 MG tablet; Take 1 tablet (20 mg) by mouth daily  The current medical regimen is effective.  Continue current medication regimen unchanged.    Encounter for screening mammogram for breast cancer  -     *MA Screening  Digital Bilateral; Future    Right ankle pain, unspecified chronicity  -     Orthopedic  Referral; Future  -     naproxen (NAPROSYN) 500 MG tablet; Take 1 tablet (500 mg) by mouth 2 times daily as needed  -     Cane Order for DME - ONLY FOR DME  Will follow up and/or notify patient of  results via My Chart to determine further need for followup    Elevated LFTs  - -     Hepatitis A Antibody IgG  -     Hepatitis B Surface Antibody  -     HEPATITIS B CORE ANTIBODY  -     HEPATITIS B SURFACE ANTIGEN  -     HEPATITS C ANTIBODY  -     US Abdomen Limited; Future  -     Hepatic function panel  -     Hepatic function panel; Future    Abdominal pain, right upper quadrant  -     US Abdomen Limited; Future  Will follow up and/or notify patient of  results via My Chart to determine further need for followup    Viral hepatitis B chronic (H)  -     Hep B Virus DNA Quant Real Time PCR; Future      PLAN:   FUTURE LABS:       - Schedule non-fasting labs in 3 months  Abdomen ultrasound   Will follow up and/or notify patient of  results via My Chart to determine further need for followup  Refer to orthopedics    Patient needs to follow up in if no improvement,or sooner if worsening of symptoms or other symptoms develop.          COUNSELING:  Reviewed preventive health counseling, as reflected in patient instructions  Special attention given to:       Regular exercise       Healthy diet/nutrition       Vision screening       Hearing screening       Fall risk prevention       Osteoporosis prevention/bone health       Colon cancer screening       Hepatitis C screening       The 10-year ASCVD risk score (Dori EDWARDS, et al., 2019) is: 25%    Values used to calculate the score:      Age: 73 years      Sex: Female      Is Non- : No      Diabetic: Yes      Tobacco smoker: No      Systolic Blood Pressure: 126 mmHg      Is BP treated: No      HDL Cholesterol: 53 mg/dL      Total Cholesterol: 172 mg/dL        Advanced Planning       BMI:   Estimated body mass index is 27.65 kg/m  as calculated from the following:    Height as of 8/22/23: 1.524 m (5').    Weight as of 8/22/23: 64.2 kg (141 lb 9.6 oz).         She reports that she has never smoked. She has never used smokeless tobacco.      Appropriate preventive services were discussed with this patient, including applicable screening as appropriate for cardiovascular disease, diabetes, osteopenia/osteoporosis, and glaucoma.  As appropriate for age/gender, discussed screening for colorectal cancer, prostate cancer, breast cancer, and cervical cancer. Checklist reviewing preventive services available has been given to the patient.    Reviewed patients plan of care and provided an AVS. The Intermediate Care Plan ( asthma action plan, low back pain action plan, and migraine action plan) for Rissa meets the Care Plan requirement. This Care Plan has been established and reviewed with the Patient and friend  .          CAMILO Benavides [facility]    Identified Health Risks:  I have reviewed Opioid Use Disorder and Substance Use Disorder risk factors and made any needed referrals.

## 2023-08-30 ENCOUNTER — ANCILLARY PROCEDURE (OUTPATIENT)
Dept: ULTRASOUND IMAGING | Facility: CLINIC | Age: 73
End: 2023-08-30
Attending: NURSE PRACTITIONER
Payer: MEDICARE

## 2023-08-30 DIAGNOSIS — R79.89 ELEVATED LFTS: ICD-10-CM

## 2023-08-30 DIAGNOSIS — R10.11 ABDOMINAL PAIN, RIGHT UPPER QUADRANT: ICD-10-CM

## 2023-08-30 LAB
HAV IGG SER QL IA: REACTIVE
HBV SURFACE AB SERPL IA-ACNC: 13.87 M[IU]/ML
HBV SURFACE AB SERPL IA-ACNC: REACTIVE M[IU]/ML
HBV SURFACE AG SERPL QL IA: NONREACTIVE
HCV AB SERPL QL IA: NONREACTIVE

## 2023-08-30 PROCEDURE — 76705 ECHO EXAM OF ABDOMEN: CPT | Performed by: RADIOLOGY

## 2023-08-31 ENCOUNTER — TELEPHONE (OUTPATIENT)
Dept: FAMILY MEDICINE | Facility: CLINIC | Age: 73
End: 2023-08-31
Payer: MEDICARE

## 2023-08-31 LAB — HBV CORE AB SERPL QL IA: REACTIVE

## 2023-08-31 NOTE — TELEPHONE ENCOUNTER
Pt called back.    Relayed provider's message.    States that he does not use tylenol, alcohol, or herbals.      Assisted in scheduling lab.      Mckenzie Black RN  [facility]

## 2023-08-31 NOTE — TELEPHONE ENCOUNTER
RN called patient with [place]  and LVM to call clinic at [phone].     If patient calls back, please relay provider message below and assist in scheduling lab appointment next week.    ESTHER Swartz  [facility] Triage        ----- Message from CAMILO Benavides CNP sent at 8/31/2023 10:36 AM CDT -----  Please call   Ultrasound shows gallbladder is normal   However liver appears more dense   Lets recheck her liver tests in a week   Also please double check on if taking a lot of tylenol, herbals or alcohol   Cherie Covington NP, APRN CNP

## 2023-09-04 DIAGNOSIS — R79.89 ELEVATED LFTS: ICD-10-CM

## 2023-09-04 DIAGNOSIS — E11.65 TYPE 2 DIABETES MELLITUS WITH HYPERGLYCEMIA, WITHOUT LONG-TERM CURRENT USE OF INSULIN (H): Primary | ICD-10-CM

## 2023-09-05 ENCOUNTER — TELEPHONE (OUTPATIENT)
Dept: FAMILY MEDICINE | Facility: CLINIC | Age: 73
End: 2023-09-05
Payer: MEDICARE

## 2023-09-05 NOTE — RESULT ENCOUNTER NOTE
Please call Rsisa Acosta,    Attached are your test results.  -Normal red blood cell (hgb) levels, normal white blood cell count and normal platelet levels.  -Cholesterol levels are at your goal levels.  ADVISE: continuing your medication, a regular exercise program with at least 150 minutes of aerobic exercise per week, and eating a low saturated fat/low carbohydrate diet.  Also, you should recheck this fasting cholesterol panel in 12 months.  -Liver and gallbladder tests (ALT,AST, Alk phos,bilirubin) are significantly elevated. ADVISE: need to recheck in a month   -Kidney function (GFR) is normal.  -Sodium is normal.  -Potassium is normal.  -Calcium is normal.  -Glucose is elevated due to your diabetes.  -A1C (test of diabetes control the last 2-3 months) is elevated above your goal   Please make sure taking the metformin regularly. If not would restart and recheck in 3 month. If has been taking the metformin need to increase the dose and then recheck    Please recheck your A1C test in 3 months.   -TSH (thyroid stimulating hormone) level is normal which indicates normal thyroid function.  -Microalbumin (urine protein) test is normal.  ADVISE: rechecking this annually.   Please contact us if you have any questions.    Cherie Covington, CNP

## 2023-09-05 NOTE — LETTER
September 11, 2023      Rissa Ayesha Acosta  [address] [place] [address]   [facility] [address]        Dear Rissa,     Attached are your test results.  -Normal red blood cell (hgb) levels, normal white blood cell count and normal platelet levels.  -Cholesterol levels are at your goal levels.  ADVISE: continuing your medication, a regular exercise program with at least 150 minutes of aerobic exercise per week, and eating a low saturated fat/low carbohydrate diet.  Also, you should recheck this fasting cholesterol panel in 12 months.  -Liver and gallbladder tests (ALT,AST, Alk phos,bilirubin) are significantly elevated. ADVISE: need to recheck in a month   -Kidney function (GFR) is normal.  -Sodium is normal.  -Potassium is normal.  -Calcium is normal.  -Glucose is elevated due to your diabetes.  -A1C (test of diabetes control the last 2-3 months) is elevated above your goal   Please make sure taking the metformin regularly. If not would restart and recheck in 3 month. If has been taking the metformin need to increase the dose and then recheck    Please recheck your A1C test in 3 months.   -TSH (thyroid stimulating hormone) level is normal which indicates normal thyroid function.  -Microalbumin (urine protein) test is normal.  ADVISE: rechecking this annually.   Please contact us if you have any questions.     Cherie Covington, CNP

## 2023-09-05 NOTE — TELEPHONE ENCOUNTER
RN called patient via [place]  and LVM to call clinic at [phone].     If patient calls back please relay provider message below. Assist in scheduling lab appointment in one month to recheck hepatic tests.     ESTHER Swartz  [facility] Triage        ----- Message from CAMILO Benavides CNP sent at 9/4/2023  9:02 PM CDT -----  Please call Rissa Acosta,    Attached are your test results.  -Normal red blood cell (hgb) levels, normal white blood cell count and normal platelet levels.  -Cholesterol levels are at your goal levels.  ADVISE: continuing your medication, a regular exercise program with at least 150 minutes of aerobic exercise per week, and eating a low saturated fat/low carbohydrate diet.  Also, you should recheck this fasting cholesterol panel in 12 months.  -Liver and gallbladder tests (ALT,AST, Alk phos,bilirubin) are significantly elevated. ADVISE: need to recheck in a month   -Kidney function (GFR) is normal.  -Sodium is normal.  -Potassium is normal.  -Calcium is normal.  -Glucose is elevated due to your diabetes.  -A1C (test of diabetes control the last 2-3 months) is elevated above your goal   Please make sure taking the metformin regularly. If not would restart and recheck in 3 month. If has been taking the metformin need to increase the dose and then recheck    Please recheck your A1C test in 3 months.   -TSH (thyroid stimulating hormone) level is normal which indicates normal thyroid function.  -Microalbumin (urine protein) test is normal.  ADVISE: rechecking this annually.   Please contact us if you have any questions.    Cherie Covington CNP

## 2023-09-06 NOTE — TELEPHONE ENCOUNTER
RN called patient via [place]  and LVM to call clinic at [phone].     If patient calls back please relay provider message below. Assist in scheduling lab appointment in one month to recheck hepatic tests.     ESTHER Swartz  [facility]

## 2023-09-07 ENCOUNTER — LAB (OUTPATIENT)
Dept: LAB | Facility: CLINIC | Age: 73
End: 2023-09-07
Payer: MEDICARE

## 2023-09-07 DIAGNOSIS — B18.1 VIRAL HEPATITIS B CHRONIC (H): ICD-10-CM

## 2023-09-07 DIAGNOSIS — R79.89 ELEVATED LFTS: ICD-10-CM

## 2023-09-07 LAB
ALBUMIN SERPL BCG-MCNC: 4.3 G/DL (ref 3.5–5.2)
ALP SERPL-CCNC: 104 U/L (ref 35–104)
ALT SERPL W P-5'-P-CCNC: 46 U/L (ref 0–50)
AST SERPL W P-5'-P-CCNC: 42 U/L (ref 0–45)
BILIRUB DIRECT SERPL-MCNC: <0.2 MG/DL (ref 0–0.3)
BILIRUB SERPL-MCNC: 0.6 MG/DL
PROT SERPL-MCNC: 7.9 G/DL (ref 6.4–8.3)

## 2023-09-07 PROCEDURE — 80076 HEPATIC FUNCTION PANEL: CPT

## 2023-09-07 PROCEDURE — 87517 HEPATITIS B DNA QUANT: CPT

## 2023-09-07 PROCEDURE — 36415 COLL VENOUS BLD VENIPUNCTURE: CPT

## 2023-09-08 NOTE — RESULT ENCOUNTER NOTE
Kishor Acosta,    Attached are your test results.  -Liver and gallbladder tests (ALT,AST, Alk phos,bilirubin) are normal.   Please contact us if you have any questions.    Cherie Covington, CNP

## 2023-09-08 NOTE — TELEPHONE ENCOUNTER
Multiple calls made to patient, unable to reach.     Routing to provider to review and advise on next steps.     ESTHER Mcclendon  [facility]

## 2023-09-09 LAB — HBV DNA SERPL NAA+PROBE-ACNC: NOT DETECTED IU/ML

## 2023-09-10 NOTE — RESULT ENCOUNTER NOTE
Kishor Acosta,    Attached are your test results.  Hep B Virus DNA shows no active disease   Please contact us if you have any questions.    Cherie Covington, CNP

## 2023-09-27 ENCOUNTER — PATIENT OUTREACH (OUTPATIENT)
Dept: GERIATRIC MEDICINE | Facility: CLINIC | Age: 73
End: 2023-09-27
Payer: MEDICARE

## 2023-09-27 NOTE — PROGRESS NOTES
"[facility] Care Coordination Contact    Per CC, mailed client an \"Unable to Contact\" letter.    Nola Pereira  Case Management Specialist  [facility]  [phone]    "

## 2023-09-27 NOTE — LETTER
September 27, 2023    CHARLI DONOVAN LIPSCOMB  [address]   [facility] [address]    Dear Charli:     I m your care coordinator. I ve been unable to reach you by phone. I am writing to ask you or your authorized representative to call me at [phone]. If you reach my voicemail, leave a message with your daytime phone number. Include a date and time that I can call you. If you are hearing impaired, call the [place] Relay at 674 or [phone] (iemxgt-eg-axnmma relay service).    The reason I am trying to reach you is:     [] To schedule an assessment  [x] For your six (6)-month check-in  [] Other:      Please call me as soon as you receive this letter. I look forward to speaking with you.    Sincerely,    Marifer Barboza RN, BSN, PHN  [phone]  [email or web address]        [id number] accepted  [id number]                                                                        B  (08/2022)

## 2023-10-02 ENCOUNTER — ANCILLARY PROCEDURE (OUTPATIENT)
Dept: MAMMOGRAPHY | Facility: CLINIC | Age: 73
End: 2023-10-02
Attending: NURSE PRACTITIONER
Payer: MEDICARE

## 2023-10-02 DIAGNOSIS — Z12.31 ENCOUNTER FOR SCREENING MAMMOGRAM FOR BREAST CANCER: ICD-10-CM

## 2023-10-02 PROCEDURE — 77067 SCR MAMMO BI INCL CAD: CPT | Performed by: RADIOLOGY

## 2023-10-03 ENCOUNTER — PATIENT OUTREACH (OUTPATIENT)
Dept: GERIATRIC MEDICINE | Facility: CLINIC | Age: 73
End: 2023-10-03
Payer: MEDICARE

## 2023-10-03 NOTE — PROGRESS NOTES
[facility] Care Coordination Contact      [facility] Six-Month Telephone Assessment  Unable to completed. Unable to reach x4.   Marifer Barboza RN  [facility]  [phone]

## 2023-11-04 DIAGNOSIS — M25.571 RIGHT ANKLE PAIN, UNSPECIFIED CHRONICITY: ICD-10-CM

## 2023-11-04 DIAGNOSIS — E11.65 TYPE 2 DIABETES MELLITUS WITH HYPERGLYCEMIA, WITHOUT LONG-TERM CURRENT USE OF INSULIN (H): ICD-10-CM

## 2023-11-06 RX ORDER — METFORMIN HCL 500 MG
500 TABLET, EXTENDED RELEASE 24 HR ORAL
Qty: 90 TABLET | Refills: 0 | OUTPATIENT
Start: 2023-11-06

## 2023-11-06 RX ORDER — NAPROXEN 500 MG/1
TABLET ORAL
Qty: 30 TABLET | Refills: 1 | Status: SHIPPED | OUTPATIENT
Start: 2023-11-06 | End: 2024-02-08

## 2023-11-07 ENCOUNTER — PATIENT OUTREACH (OUTPATIENT)
Dept: GERIATRIC MEDICINE | Facility: CLINIC | Age: 73
End: 2023-11-07
Payer: MEDICARE

## 2023-11-07 NOTE — PROGRESS NOTES
[facility] Care Coordination Contact     CHW, elida w/ Mbr dtr [place] to remind to have Mbr schedule  diabetic eye exam.  Left contact info.      FAITH Hampton  [facility]  [phone]

## 2023-11-23 DIAGNOSIS — E11.65 TYPE 2 DIABETES MELLITUS WITH HYPERGLYCEMIA, WITHOUT LONG-TERM CURRENT USE OF INSULIN (H): ICD-10-CM

## 2023-11-24 RX ORDER — METFORMIN HCL 500 MG
500 TABLET, EXTENDED RELEASE 24 HR ORAL
Qty: 90 TABLET | Refills: 0 | Status: SHIPPED | OUTPATIENT
Start: 2023-11-24 | End: 2024-03-11

## 2024-01-22 ENCOUNTER — PATIENT OUTREACH (OUTPATIENT)
Dept: GERIATRIC MEDICINE | Facility: CLINIC | Age: 74
End: 2024-01-22
Payer: MEDICARE

## 2024-01-22 NOTE — PROGRESS NOTES
"[facility] Care Coordination Contact    Per CC, mailed client an \"Unable to Contact\" letter.      Nola Pereira  Case Management Specialist  [facility]  [phone]    "

## 2024-01-22 NOTE — LETTER
January 22, 2024    RISSA DONOVAN DEISI  [address]   [facility] [address]    Dear Rissa:     I m your care coordinator. I ve been unable to reach you by phone. I am writing to ask you or your authorized representative to call me at [phone]. If you reach my voicemail, leave a message with your daytime phone number. Include a date and time that I can call you. If you are hearing impaired, call the [place] Relay at 971 or [phone] (ggqnnq-az-emufgi relay service).    The reason I am trying to reach you is:     [x] To schedule an assessment  [] For your six (6)-month check-in  [] Other:      Please call me as soon as you receive this letter. I look forward to speaking with you.    Sincerely,    Marifer Barboza RN, BSN, PHN  [phone]  [email or web address]        [id number] accepted  [id number]                                                                        B  (08/2022)

## 2024-01-23 ENCOUNTER — PATIENT OUTREACH (OUTPATIENT)
Dept: GERIATRIC MEDICINE | Facility: CLINIC | Age: 74
End: 2024-01-23
Payer: MEDICARE

## 2024-01-23 NOTE — LETTER
January 24, 2024    RISSA ZION LIPSCOMB  [address] [place] [address]   [facility] [address]        Dear Rissa:    As a member of [facility]'s MSC+ program, we offer a health risk assessment at no cost to you. I know you don't want to have the assessment right now. If you change your mind, please call me at the number below.    Who performs the health risk assessment?  A [facility] Care Coordinator performs the assessment. Our Care Coordinators can also help you understand your benefits. They can tell you about services to aid you at home, such as managing your care with your doctors if your health worsens.    Our Care Coordinators are here for you if you need:  Support for activities you used to do by yourself (including making meals, bathing and paying bills)  Equipment for bathroom or home safety  Help finding a new place to live  Information on staying healthy, preventing falls and immunizations    Questions?  If you have questions, or you would like to do he assessment, call me at [phone]. TTY users call [phone]. I'm here from 8am to 5pm. I may reach out to you again soon.       Sincerely,         Marifer Barboza RN, BSN, PHN  [phone]  [email or web address]      \<[id number] accepted  [id number]>    [id number] (21/2021)

## 2024-01-23 NOTE — PROGRESS NOTES
[facility] Care Coordination Contact      [facility] Refusal Telephone Assessment    Member refused home visit HRA on 1/23/24 (reason: does not need it).    ER visits: No  Hospitalizations: No  Health concerns: no  Falls/Injuries: No  ADL/IADL Dependencies:na        Member currently receiving the following home care services:   na  Member currently receiving the following community resources:  na  Informal support(s):  family helps with paper works and finance as needed  Per member, does not have any concerns and no questions. Reviewed [facility] plan for benefits as member wish to get a healthy saving funds for groceries. Instructions given to daughter in law Ranjana to assist with calling [facility] to sign up for [facility] plan. Discussed Advanced Health directive. Emailed a blank copy to daughter in law email [email or web address].   Advanced Care Planning discussion, complete code section.    [facility] Health Plan sponsored benefits: Shared information re: Silver Sneakers/gym memberships, ASA, Calcium +D.    Follow-Up Plan: Member informed of future contact, plan to f/u with member with a 6 month telephone assessment and offer a home visit.  Contact information shared with member and family, encouraged member to call with any questions or concerns at any time.    This CC note routed to PCP, Cherie Covington.      Tasked CMS  Marifer Barboza RN  [facility]  [phone]

## 2024-01-24 NOTE — PROGRESS NOTES
"Per CC, mailed client a \"Refusal of Home Visit\" letter.    Nola Pereira  Case Management Specialist  [facility]  [phone]    "

## 2024-01-27 DIAGNOSIS — M25.571 CHRONIC PAIN OF RIGHT ANKLE: ICD-10-CM

## 2024-01-27 DIAGNOSIS — G89.29 CHRONIC PAIN OF RIGHT ANKLE: ICD-10-CM

## 2024-02-08 DIAGNOSIS — M25.571 RIGHT ANKLE PAIN, UNSPECIFIED CHRONICITY: ICD-10-CM

## 2024-02-08 DIAGNOSIS — E11.65 TYPE 2 DIABETES MELLITUS WITH HYPERGLYCEMIA, WITHOUT LONG-TERM CURRENT USE OF INSULIN (H): ICD-10-CM

## 2024-02-08 RX ORDER — NAPROXEN 500 MG/1
500 TABLET ORAL 2 TIMES DAILY PRN
Qty: 30 TABLET | Refills: 1 | Status: SHIPPED | OUTPATIENT
Start: 2024-02-08 | End: 2024-02-29

## 2024-02-08 RX ORDER — METFORMIN HCL 500 MG
500 TABLET, EXTENDED RELEASE 24 HR ORAL
Qty: 90 TABLET | Refills: 0 | OUTPATIENT
Start: 2024-02-08

## 2024-02-29 DIAGNOSIS — M25.571 RIGHT ANKLE PAIN, UNSPECIFIED CHRONICITY: ICD-10-CM

## 2024-02-29 RX ORDER — NAPROXEN 500 MG/1
500 TABLET ORAL 2 TIMES DAILY PRN
Qty: 30 TABLET | Refills: 1 | Status: SHIPPED | OUTPATIENT
Start: 2024-02-29 | End: 2024-04-09

## 2024-03-11 DIAGNOSIS — E11.65 TYPE 2 DIABETES MELLITUS WITH HYPERGLYCEMIA, WITHOUT LONG-TERM CURRENT USE OF INSULIN (H): ICD-10-CM

## 2024-03-11 DIAGNOSIS — K21.9 GASTROESOPHAGEAL REFLUX DISEASE WITHOUT ESOPHAGITIS: ICD-10-CM

## 2024-03-11 DIAGNOSIS — E11.9 TYPE 2 DIABETES MELLITUS WITHOUT COMPLICATION, WITHOUT LONG-TERM CURRENT USE OF INSULIN (H): ICD-10-CM

## 2024-03-11 RX ORDER — BLOOD SUGAR DIAGNOSTIC
STRIP MISCELLANEOUS
Qty: 50 STRIP | Refills: 1 | Status: SHIPPED | OUTPATIENT
Start: 2024-03-11

## 2024-03-11 RX ORDER — NICOTINE POLACRILEX 4 MG/1
20 GUM, CHEWING ORAL DAILY
Qty: 90 TABLET | Refills: 0 | Status: SHIPPED | OUTPATIENT
Start: 2024-03-11 | End: 2024-09-05

## 2024-03-11 RX ORDER — METFORMIN HCL 500 MG
500 TABLET, EXTENDED RELEASE 24 HR ORAL
Qty: 90 TABLET | Refills: 0 | Status: SHIPPED | OUTPATIENT
Start: 2024-03-11 | End: 2024-03-15

## 2024-03-11 NOTE — TELEPHONE ENCOUNTER
Appointments in Next Year      Mar 15, 2024  8:00 AM  (Arrive by 7:40 AM)  Welcome to Medicare Visit with CAMILO Benavides CNP  [facility] ([facility] ) [phone]     Mar 28, 2024  8:30 AM  (Arrive by 8:10 AM)  Provider Visit with CAMILO Benavides CNP  [facility] ([facility] ) [phone]     Sep 05, 2024  7:00 AM  (Arrive by 6:45 AM)  MEDICARE ANNUAL WELLNESS VISIT with CAMILO Benavides CNP  [facility] ([facility] ) [phone]

## 2024-03-15 ENCOUNTER — OFFICE VISIT (OUTPATIENT)
Dept: FAMILY MEDICINE | Facility: CLINIC | Age: 74
End: 2024-03-15
Payer: MEDICARE

## 2024-03-15 ENCOUNTER — ANCILLARY PROCEDURE (OUTPATIENT)
Dept: GENERAL RADIOLOGY | Facility: CLINIC | Age: 74
End: 2024-03-15
Attending: NURSE PRACTITIONER
Payer: MEDICARE

## 2024-03-15 VITALS
SYSTOLIC BLOOD PRESSURE: 124 MMHG | OXYGEN SATURATION: 98 % | DIASTOLIC BLOOD PRESSURE: 81 MMHG | TEMPERATURE: 98.1 F | WEIGHT: 135.31 LBS | HEART RATE: 68 BPM | RESPIRATION RATE: 16 BRPM | BODY MASS INDEX: 26.57 KG/M2 | HEIGHT: 60 IN

## 2024-03-15 DIAGNOSIS — M79.642 BILATERAL HAND PAIN: ICD-10-CM

## 2024-03-15 DIAGNOSIS — E11.65 TYPE 2 DIABETES MELLITUS WITH HYPERGLYCEMIA, WITHOUT LONG-TERM CURRENT USE OF INSULIN (H): Primary | ICD-10-CM

## 2024-03-15 DIAGNOSIS — M79.641 BILATERAL HAND PAIN: ICD-10-CM

## 2024-03-15 DIAGNOSIS — M19.042 PRIMARY OSTEOARTHRITIS OF BOTH HANDS: ICD-10-CM

## 2024-03-15 DIAGNOSIS — M25.571 CHRONIC PAIN OF RIGHT ANKLE: ICD-10-CM

## 2024-03-15 DIAGNOSIS — E11.65 TYPE 2 DIABETES MELLITUS WITH HYPERGLYCEMIA, WITHOUT LONG-TERM CURRENT USE OF INSULIN (H): ICD-10-CM

## 2024-03-15 DIAGNOSIS — K21.9 GASTROESOPHAGEAL REFLUX DISEASE WITHOUT ESOPHAGITIS: ICD-10-CM

## 2024-03-15 DIAGNOSIS — G89.29 CHRONIC PAIN OF RIGHT ANKLE: ICD-10-CM

## 2024-03-15 DIAGNOSIS — E78.5 HYPERLIPIDEMIA LDL GOAL <100: ICD-10-CM

## 2024-03-15 DIAGNOSIS — B18.1 VIRAL HEPATITIS B CHRONIC (H): ICD-10-CM

## 2024-03-15 DIAGNOSIS — M19.041 PRIMARY OSTEOARTHRITIS OF BOTH HANDS: ICD-10-CM

## 2024-03-15 LAB — HBA1C MFR BLD: 8.6 % (ref 0–5.6)

## 2024-03-15 PROCEDURE — 36415 COLL VENOUS BLD VENIPUNCTURE: CPT | Performed by: NURSE PRACTITIONER

## 2024-03-15 PROCEDURE — 73130 X-RAY EXAM OF HAND: CPT | Mod: TC | Performed by: RADIOLOGY

## 2024-03-15 PROCEDURE — 80048 BASIC METABOLIC PNL TOTAL CA: CPT | Performed by: NURSE PRACTITIONER

## 2024-03-15 PROCEDURE — 83036 HEMOGLOBIN GLYCOSYLATED A1C: CPT | Performed by: NURSE PRACTITIONER

## 2024-03-15 PROCEDURE — 73610 X-RAY EXAM OF ANKLE: CPT | Mod: TC | Performed by: RADIOLOGY

## 2024-03-15 PROCEDURE — 99214 OFFICE O/P EST MOD 30 MIN: CPT | Performed by: NURSE PRACTITIONER

## 2024-03-15 RX ORDER — OMEPRAZOLE 20 MG/1
20 TABLET, DELAYED RELEASE ORAL DAILY
Qty: 90 TABLET | Refills: 3 | Status: SHIPPED | OUTPATIENT
Start: 2024-03-15 | End: 2024-09-05

## 2024-03-15 RX ORDER — METFORMIN HCL 500 MG
500 TABLET, EXTENDED RELEASE 24 HR ORAL
Qty: 90 TABLET | Refills: 0 | Status: CANCELLED | OUTPATIENT
Start: 2024-03-15

## 2024-03-15 RX ORDER — METFORMIN HCL 500 MG
500 TABLET, EXTENDED RELEASE 24 HR ORAL
Qty: 90 TABLET | Refills: 2 | Status: SHIPPED | OUTPATIENT
Start: 2024-03-15 | End: 2024-03-25

## 2024-03-15 RX ORDER — LANCETS
EACH MISCELLANEOUS
Qty: 100 EACH | Refills: 6 | Status: SHIPPED | OUTPATIENT
Start: 2024-03-15 | End: 2024-03-15

## 2024-03-15 RX ORDER — LANCETS
1 EACH MISCELLANEOUS DAILY
Qty: 100 EACH | Refills: 6 | Status: SHIPPED | OUTPATIENT
Start: 2024-03-15

## 2024-03-15 ASSESSMENT — PAIN SCALES - GENERAL: PAINLEVEL: NO PAIN (0)

## 2024-03-15 NOTE — PROGRESS NOTES
Divina Kwok is a 74 year old, presenting for the following health issues:  Diabetes        3/15/2024     8:08 AM   Additional Questions   Roomed by Sharona LOPEZ   Accompanied by Matt (Friend)         3/15/2024     8:08 AM   Patient Reported Additional Medications   Patient reports taking the following new medications None     History of Present Illness       Reason for visit:  Diabetes Check    She eats 2-3 servings of fruits and vegetables daily.She consumes 0 sweetened beverage(s) daily.She exercises with enough effort to increase her heart rate 20 to 29 minutes per day.  She exercises with enough effort to increase her heart rate 7 days per week.   She is taking medications regularly.     Diabetes Follow-up    How often are you checking your blood sugar? Not at all  What concerns do you have today about your diabetes? None   Do you have any of these symptoms? (Select all that apply)  No numbness or tingling in feet.  No redness, sores or blisters on feet.  No complaints of excessive thirst.  No reports of blurry vision.  No significant changes to weight.  Have you had a diabetic eye exam in the last 12 months? No        BP Readings from Last 2 Encounters:   03/15/24 124/81   08/29/23 126/82     Hemoglobin A1C (%)   Date Value   08/22/2023 8.6 (H)   08/26/2022 7.4 (H)   05/11/2021 6.9 (H)   02/11/2020 6.8 (H)     LDL Cholesterol Calculated (mg/dL)   Date Value   08/22/2023 100   08/26/2022 95   05/11/2021 109 (H)   02/11/2020 86         Lab work is in process    Will occasionally feel like something in the back of her throat intermittently several times a day   Does take occasional Ibuprofen for her back   Not after eating empty or full stomach   Sometimes does have heartburn       Also hands and right ankle bother her for several years   This is what she takes Naproxen for      Labs reviewed in EPIC  BP Readings from Last 3 Encounters:   03/15/24 124/81   08/29/23 126/82   08/22/23 119/69    Wt Readings  from Last 3 Encounters:   03/15/24 61.4 kg (135 lb 5 oz)   08/29/23 63.8 kg (140 lb 11.2 oz)   08/22/23 64.2 kg (141 lb 9.6 oz)                  Patient Active Problem List   Diagnosis    CARDIOVASCULAR SCREENING; LDL GOAL LESS THAN 160    DDD (degenerative disc disease), cervical    DDD (degenerative disc disease), lumbar    Vitamin D insufficiency    Elevated LFTs    Positive PPD    Bilateral low back pain without sciatica    Lumbar radiculopathy    Advanced directives, counseling/discussion    Closed fracture of lumbar vertebra (H)    Spinal stenosis of lumbar region    Viral hepatitis    Type 2 diabetes mellitus with hyperglycemia, without long-term current use of insulin (H)    Osteopenia    Neuropathic pain, leg    Exposure to hepatitis B    Spinal stenosis of lumbar region without neurogenic claudication    Osteoporosis    Hyperlipidemia LDL goal <100     Past Surgical History:   Procedure Laterality Date    BACK SURGERY  2011    [facility]    CATARACT IOL, RT/LT Right 2005    COLONOSCOPY N/A 3/3/2020    Procedure: Colonoscopy, With Polypectomy And Biopsy;  Surgeon: Arlet Zaragoza DO;  Location: [facility] OR    COLONOSCOPY WITH CO2 INSUFFLATION N/A 3/3/2020    Procedure: COLONOSCOPY, WITH CO2 INSUFFLATION;  Surgeon: Arlet Zaragoza DO;  Location: [facility] OR       Social History     Tobacco Use    Smoking status: Never     Passive exposure: Never    Smokeless tobacco: Never   Substance Use Topics    Alcohol use: No     Family History   Problem Relation Age of Onset    Diabetes No family hx of     Macular Degeneration No family hx of     Glaucoma No family hx of     Strabismus No family hx of          Current Outpatient Medications   Medication Sig Dispense Refill    ACCU-CHEK GUIDE test strip USE TO TEST BLOOD SUGAR 1 TIMES DAILY OR AS DIRECTED. 50 strip 1    ascorbic acid (VITAMIN C) 500 MG tablet Take by mouth daily      B Complex Vitamins (VITAMIN-B COMPLEX PO)       blood glucose (BETSY CONTOUR NEXT)  test strip Use to test blood sugar 2 times daily or as directed. 100 strip 3    blood glucose (NO BRAND SPECIFIED) test strip Use to test blood sugar  1 to 2  times daily or as directed. To accompany: Blood Glucose Monitor Brands: per insurance. 100 strip 11    blood glucose calibration (NO BRAND SPECIFIED) solution To accompany: Blood Glucose Monitor Brands: per insurance. 1 Bottle 3    blood glucose monitoring (ACCU-CHEK FASTCLIX) lancets 1 each by In Vitro route daily Use to test blood sugar 1 times daily or as directed. 100 each 11    blood glucose monitoring (BETSY MICROLET) lancets USE TO TEST 1 TO 2 TIMES DAILY, AND AS NEEDED 200 each 3    blood glucose monitoring (NO BRAND SPECIFIED) meter device kit Use to test blood sugar 1 to 2  times daily or as directed. Preferred blood glucose meter OR supplies to accompany: Blood Glucose Monitor Brands: per insurance. 1 kit 0    blood glucose monitoring (ONETOUCH VERIO) meter device kit USE TO TEST BLOOD SUGAR 2 TIMES DAILY OR AS DIRECTED. 1 kit 0    calcium carb-cholecalciferol 600-500 MG-UNIT CAPS TAKE 1 CAPSULE BY MOUTH TWICE A  capsule 3    Calcium Carbonate-Vitamin D (CALCIUM-VITAMIN D) 600-125 MG-UNIT TABS TAKE 1 CAPSULE BY MOUTH TWICE A DAY      diclofenac (VOLTAREN) 1 % topical gel APPLY 2 GRAMS TOPICALLY 4 TIMES DAILY 200 g 1    ibuprofen (ADVIL/MOTRIN) 600 MG tablet TAKE 1 TABLET BY MOUTH EVERY 12 HOURS AS NEEDED FOR MODERATE PAIN 180 tablet 0    metFORMIN (GLUCOPHAGE XR) 500 MG 24 hr tablet TAKE 1 TABLET BY MOUTH EVERY DAY WITH DINNER 90 tablet 0    Multiple Vitamins-Minerals (CHOICEFUL MULTIVITAMIN PO)       naproxen (NAPROSYN) 500 MG tablet TAKE 1 TABLET BY MOUTH TWICE A DAY AS NEEDED 30 tablet 1    Omega-3 Fatty Acids (FISH OIL CONCENTRATE PO)       omeprazole 20 MG tablet TAKE 1 TABLET BY MOUTH EVERY DAY 90 tablet 0    ONETOUCH VERIO IQ test strip USE TO TEST BLOOD SUGARS 2 TIMES DAILY OR AS DIRECTED 100 strip 3    ORDER FOR DME Equipment being  ordered: walker  Wheeled and seated if needed 1 each 0    ORDER FOR DME Equipment being ordered: TENS 1 Units 0    pravastatin (PRAVACHOL) 20 MG tablet Take 1 tablet (20 mg) by mouth daily 90 tablet 3     No Known Allergies  Recent Labs   Lab Test 09/07/23  0957 08/29/23  1227 08/22/23  0717 08/26/22  0800 05/11/21  1110 02/11/20  0819   A1C  --   --  8.6* 7.4* 6.9* 6.8*   LDL  --   --  100 95 109* 86   HDL  --   --  53 52 60 57   TRIG  --   --  93 115 84 117   ALT 46 70* 72*  --  40 58*   CR  --   --  0.61  --  0.67 0.59   GFRESTIMATED  --   --  >90  --  88 >90   GFRESTBLACK  --   --   --   --  >90 >90   POTASSIUM  --   --  4.4  --  4.1 4.5   TSH  --   --  0.63 0.92  --  0.60          Review of Systems  Constitutional, neuro, ENT, endocrine, pulmonary, cardiac, gastrointestinal, genitourinary, musculoskeletal, integument and psychiatric systems are negative, except as otherwise noted.      Objective    /81 (BP Location: Right arm, Patient Position: Sitting, Cuff Size: Adult Large)   Pulse 68   Temp 98.1  F (36.7  C) (Oral)   Resp 16   Ht 1.524 m (5')   Wt 61.4 kg (135 lb 5 oz)   SpO2 98%   BMI 26.43 kg/m    Body mass index is 26.43 kg/m .  Physical Exam   GENERAL: Patient is well nourished, well developed,in no apparent distress, non-toxic, in no respiratory distress and acyanotic, alert, cooperative, and well hydrated  EYES: Eyes grossly normal to inspection and conjunctivae and sclerae normal  HENT:ear canals and TM's normal and nose and mouth without ulcers or lesions   NECK:normal and supple  CARDIAC:regular rates and rhythm, no murmur, click or rub, and no irregular beats  without LE edema bilaterally  RESP: lungs clear to auscultation  unlabored respirations, no intercostal retractions or accessory muscle use  ABD:soft, nontender  SKIN: Skin color, texture, turgor normal. No rashes or lesions.  MS: extremities normal- no gross deformities noted, gait normal, and normal muscle tone  Exam of the  injured ankle reveals swelling and tenderness over the lateral malleolus. No tenderness over the medial aspect of the ankle. The fifth metatarsal is not tender. The ankle joint is intact without excessive opening on stressing. X-Ray shows fracture to be absent. The rest of the foot, ankle and leg exam is normal.  Bilateral hands are arthritic appearing   NEURO: Normal strength and tone, sensory exam grossly normal, mentation intact, and speech normal  PSYCH: Alert, oriented, thought content appropriate,mentation appears normal., affect and mood normal    Results for orders placed or performed in visit on 03/15/24   XR Ankle Right G/E 3 Views     Status: None    Narrative    XR ANKLE RIGHT G/E 3 VIEWS   3/15/2024 8:56 AM     HISTORY: Pain.  COMPARISON: None.       Impression    IMPRESSION: Normal alignment. No acute fracture. Moderate degenerative  arthritis of the tibiotalar joint. Mild lateral ankle soft tissue  swelling.    BOBBI SHIPLEY MD         SYSTEM ID:  [id number]   Results for orders placed or performed in visit on 03/15/24   XR Hand Bilateral G/E 3 Views     Status: None    Narrative    HAND BILATERAL THREE OR MORE VIEWS  DATE/TIME: 3/15/2024 8:58 AM    INDICATION: Bilateral hand pain.  COMPARISON: None available.       Impression    IMPRESSION: No acute displaced hand fracture or dislocation is  identified on either side. Advanced polyarticular interphalangeal  joint arthritic change of the fingers, particularly at the DIP joints  of the index, long and ring fingers and small finger PIP joints  bilaterally. 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 Diffuse  bone demineralization. No substantial soft tissue swelling. No  erosion.       TONIE MATIAS MD         SYSTEM ID:  [id number]   Results for orders placed or performed in visit on 03/15/24   Basic metabolic panel     Status: Abnormal   Result Value Ref Range    Sodium 140 135 - 145 mmol/L    Potassium 4.5 3.4 - 5.3 mmol/L    Chloride 104 98  - 107 mmol/L    Carbon Dioxide (CO2) 26 22 - 29 mmol/L    Anion Gap 10 7 - 15 mmol/L    Urea Nitrogen 13.0 8.0 - 23.0 mg/dL    Creatinine 0.72 0.51 - 0.95 mg/dL    GFR Estimate 87 >60 mL/min/1.73m2    Calcium 8.8 8.8 - 10.2 mg/dL    Glucose 124 (H) 70 - 99 mg/dL   Hemoglobin A1c     Status: Abnormal   Result Value Ref Range    Hemoglobin A1C 8.6 (H) 0.0 - 5.6 %     Assessment & Plan     Type 2 diabetes mellitus with hyperglycemia, without long-term current use of insulin (H)  .foot care discussed and Podiatry visits discussed, annual eye examinations at Ophthalmology discussed, glycohemoglobin monitoring discussed, and long term diabetic complications discussed  No changes in current regimen  Attempt to improve diet   Recheck in 3 months, sooner should new symptoms or   problems arise.  - blood glucose monitoring (NO BRAND SPECIFIED) meter device kit  Dispense: 1 kit; Refill: 0  - blood glucose (NO BRAND SPECIFIED) test strip  Dispense: 100 strip; Refill: 6  - metFORMIN (GLUCOPHAGE XR) 500 MG 24 hr tablet  Dispense: 90 tablet; Refill: 2  - Basic metabolic panel  - Hemoglobin A1c    Gastroesophageal reflux disease without esophagitis  Discussed the etiology of GERD with patient.  Encouraged lifestyle changes and the need for a longterm regimen of medications to help reduce symptoms.  Discussed the potential for serious complications if symptoms are not resolved.    Discussed raising the head of the bed 4 to 6 inches; avoiding chocolate, coffee, peppermint, fruit juices, tomatoes, greasy and spicy foods.  Encouraged moderation of alcoholic beverages, and avoidance of smoking.  Continue current medications as prescribed.   - omeprazole (PRILOSEC OTC) 20 MG EC tablet  Dispense: 90 tablet; Refill: 3    Hyperlipidemia LDL goal <100  The current medical regimen is effective.  Continue current medication regimen unchanged.      Chronic pain of right ankle  Will follow up and/or notify patient of  results via My Chart to  determine further need for followup   - XR Hand Bilateral G/E 3 Views  - Orthopedic  Referral    Bilateral hand pain  Will follow up and/or notify patient of  results via My Chart to determine further need for followup   - XR Ankle Right G/E 3 Views    Viral hepatitis B chronic (H)  Stable       Ordering of each unique test   Time spent by me doing chart review, history and exam, documentation and further activities per the note      BMI  Estimated body mass index is 26.43 kg/m  as calculated from the following:    Height as of this encounter: 1.524 m (5').    Weight as of this encounter: 61.4 kg (135 lb 5 oz).         See Patient Instructions  Patient Instructions   PLAN:   1.   Symptomatic therapy suggested: will start on omeprazole for stomach symptoms.  2.  Orders Placed This Encounter   Medications    blood glucose monitoring (NO BRAND SPECIFIED) meter device kit     Sig: Use to test blood sugar 1 times daily or as directed. Preferred blood glucose meter OR supplies to accompany: Blood Glucose Monitor Brands: per insurance.     Dispense:  1 kit     Refill:  0    blood glucose (NO BRAND SPECIFIED) test strip     Sig: Use to test blood sugar 1 times daily or as directed. To accompany: Blood Glucose Monitor Brands: per insurance.     Dispense:  100 strip     Refill:  6    thin (NO BRAND SPECIFIED) lancets     Sig: Use with lanceting device. To accompany: Blood Glucose Monitor Brands: per insurance.     Dispense:  100 each     Refill:  6    omeprazole (PRILOSEC OTC) 20 MG EC tablet     Sig: Take 1 tablet (20 mg) by mouth daily     Dispense:  90 tablet     Refill:  3    metFORMIN (GLUCOPHAGE XR) 500 MG 24 hr tablet     Sig: Take 1 tablet (500 mg) by mouth daily (with dinner)     Dispense:  90 tablet     Refill:  2     Orders Placed This Encounter   Procedures    XR Hand Bilateral G/E 3 Views    XR Ankle Right G/E 3 Views    Basic metabolic panel    Hemoglobin A1c     3.  Will follow up and/or notify patient  of  results via My Chart to determine further need for followup  Schedule PE September    Patient needs to follow up in if no improvement,or sooner if worsening of symptoms or other symptoms develop.              Signed Electronically by: CAMILO Benavides CNP

## 2024-03-15 NOTE — PATIENT INSTRUCTIONS
PLAN:   1.   Symptomatic therapy suggested: will start on omeprazole for stomach symptoms.  2.  Orders Placed This Encounter   Medications    blood glucose monitoring (NO BRAND SPECIFIED) meter device kit     Sig: Use to test blood sugar 1 times daily or as directed. Preferred blood glucose meter OR supplies to accompany: Blood Glucose Monitor Brands: per insurance.     Dispense:  1 kit     Refill:  0    blood glucose (NO BRAND SPECIFIED) test strip     Sig: Use to test blood sugar 1 times daily or as directed. To accompany: Blood Glucose Monitor Brands: per insurance.     Dispense:  100 strip     Refill:  6    thin (NO BRAND SPECIFIED) lancets     Sig: Use with lanceting device. To accompany: Blood Glucose Monitor Brands: per insurance.     Dispense:  100 each     Refill:  6    omeprazole (PRILOSEC OTC) 20 MG EC tablet     Sig: Take 1 tablet (20 mg) by mouth daily     Dispense:  90 tablet     Refill:  3    metFORMIN (GLUCOPHAGE XR) 500 MG 24 hr tablet     Sig: Take 1 tablet (500 mg) by mouth daily (with dinner)     Dispense:  90 tablet     Refill:  2     Orders Placed This Encounter   Procedures    XR Hand Bilateral G/E 3 Views    XR Ankle Right G/E 3 Views    Basic metabolic panel    Hemoglobin A1c     3.  Will follow up and/or notify patient of  results via My Chart to determine further need for followup  Schedule PE September    Patient needs to follow up in if no improvement,or sooner if worsening of symptoms or other symptoms develop.

## 2024-03-16 LAB
ANION GAP SERPL CALCULATED.3IONS-SCNC: 10 MMOL/L (ref 7–15)
BUN SERPL-MCNC: 13 MG/DL (ref 8–23)
CALCIUM SERPL-MCNC: 8.8 MG/DL (ref 8.8–10.2)
CHLORIDE SERPL-SCNC: 104 MMOL/L (ref 98–107)
CREAT SERPL-MCNC: 0.72 MG/DL (ref 0.51–0.95)
DEPRECATED HCO3 PLAS-SCNC: 26 MMOL/L (ref 22–29)
EGFRCR SERPLBLD CKD-EPI 2021: 87 ML/MIN/1.73M2
GLUCOSE SERPL-MCNC: 124 MG/DL (ref 70–99)
POTASSIUM SERPL-SCNC: 4.5 MMOL/L (ref 3.4–5.3)
SODIUM SERPL-SCNC: 140 MMOL/L (ref 135–145)

## 2024-03-18 NOTE — RESULT ENCOUNTER NOTE
Kishor Acosta,    Attached are your test results.  Xray shows moderate arthritis   Will refer to orthopedics    Please contact us if you have any questions.    Cherie Covington, CNP

## 2024-03-21 DIAGNOSIS — G89.29 CHRONIC PAIN OF RIGHT ANKLE: ICD-10-CM

## 2024-03-21 DIAGNOSIS — M25.571 CHRONIC PAIN OF RIGHT ANKLE: ICD-10-CM

## 2024-03-24 PROBLEM — B18.1 VIRAL HEPATITIS B CHRONIC (H): Status: ACTIVE | Noted: 2024-03-24

## 2024-03-25 DIAGNOSIS — E11.65 TYPE 2 DIABETES MELLITUS WITH HYPERGLYCEMIA, WITHOUT LONG-TERM CURRENT USE OF INSULIN (H): ICD-10-CM

## 2024-03-25 RX ORDER — METFORMIN HCL 500 MG
1000 TABLET, EXTENDED RELEASE 24 HR ORAL
Qty: 180 TABLET | Refills: 1 | Status: SHIPPED | OUTPATIENT
Start: 2024-03-25 | End: 2024-06-28 | Stop reason: SINTOL

## 2024-03-25 NOTE — RESULT ENCOUNTER NOTE
Kishor Acosta,    Attached are your test results.  -Kidney function (GFR) is normal.  -Sodium is normal.  -Potassium is normal.  -Calcium is normal.  -Glucose is elevated due to your diabetes.  -A1C (test of diabetes control the last 2-3 months) is at your goal. Will increase your metformin to 2 tablets a day Please recheck your A1C test in 3 months.    Please contact us if you have any questions.    Cherie Covington, CNP

## 2024-03-25 NOTE — TELEPHONE ENCOUNTER
DIAGNOSIS: chronic pain of R ankle / Xr / Cherie Covington APRN CNP / Medicare [employer] / [facility]  Please use iPad or phones [phone] to reach  and follow prompts     APPOINTMENT DATE: 3.26.24   NOTES STATUS DETAILS   OFFICE NOTE from referring provider Internal 3.15.24, 8.29.23 + more  [facility] Covington   OFFICE NOTE from other specialist Internal 12.23.15  [facility]   MEDICATION LIST Internal    DEXA (osteoporosis/bone health) Internal 2.8.20   XRAYS (IMAGES & REPORTS) Internal 3.15.24, 8.26.22, 5.11.21, 12.10.15,   XR Ankle Right

## 2024-03-25 NOTE — RESULT ENCOUNTER NOTE
Kishor Acosta,    Attached are your test results.  Significant arthritis in both hands .would consider referral to orthopedics for the hand pain    Please be aware that coverage of these services is subject to the terms and limitations of your health insurance plan.  Call member services at your health plan with any benefit or coverage questions.  The [facility]  will call you to coordinate your care as prescribed by your provider. A representative will call you within 2 business days to help you schedule your appointment, or you may contact the  Representative at: [phone].       Please contact us if you have any questions.    Cherie Covington, CNP

## 2024-03-26 ENCOUNTER — OFFICE VISIT (OUTPATIENT)
Dept: ORTHOPEDICS | Facility: CLINIC | Age: 74
End: 2024-03-26
Attending: NURSE PRACTITIONER
Payer: MEDICARE

## 2024-03-26 ENCOUNTER — PRE VISIT (OUTPATIENT)
Dept: ORTHOPEDICS | Facility: CLINIC | Age: 74
End: 2024-03-26

## 2024-03-26 DIAGNOSIS — G89.29 CHRONIC PAIN OF RIGHT ANKLE: ICD-10-CM

## 2024-03-26 DIAGNOSIS — M19.071 PRIMARY OSTEOARTHRITIS OF RIGHT ANKLE: Primary | ICD-10-CM

## 2024-03-26 DIAGNOSIS — M25.571 CHRONIC PAIN OF RIGHT ANKLE: ICD-10-CM

## 2024-03-26 PROCEDURE — 20605 DRAIN/INJ JOINT/BURSA W/O US: CPT | Mod: RT | Performed by: ORTHOPAEDIC SURGERY

## 2024-03-26 RX ORDER — TRIAMCINOLONE ACETONIDE 40 MG/ML
40 INJECTION, SUSPENSION INTRA-ARTICULAR; INTRAMUSCULAR
Status: SHIPPED | OUTPATIENT
Start: 2024-03-26

## 2024-03-26 RX ORDER — LIDOCAINE HYDROCHLORIDE 10 MG/ML
3 INJECTION, SOLUTION EPIDURAL; INFILTRATION; INTRACAUDAL; PERINEURAL
Status: SHIPPED | OUTPATIENT
Start: 2024-03-26

## 2024-03-26 RX ADMIN — LIDOCAINE HYDROCHLORIDE 3 ML: 10 INJECTION, SOLUTION EPIDURAL; INFILTRATION; INTRACAUDAL; PERINEURAL at 09:59

## 2024-03-26 RX ADMIN — TRIAMCINOLONE ACETONIDE 40 MG: 40 INJECTION, SUSPENSION INTRA-ARTICULAR; INTRAMUSCULAR at 09:59

## 2024-03-26 NOTE — NURSING NOTE
[address] [address]  Dept: [phone]  ______________________________________________________________________________    Patient: Rissa Acosta   : 1950   MRN: [id number]   2024    INVASIVE PROCEDURE SAFETY CHECKLIST    Date: 3/26/24   Procedure: Right ankle IA CSI  Patient Name: Rissa Acosta  MRN: [id number]  YOB: 1950    Action: Complete sections as appropriate. Any discrepancy results in a HARD COPY until resolved.     PRE PROCEDURE:  Patient ID verified with 2 identifiers (name and  or MRN): Yes  Procedure and site verified with patient/designee (when able): Yes  Accurate consent documentation in medical record: Yes  H&P (or appropriate assessment) documented in medical record: Yes  H&P must be up to 20 days prior to procedure and updates within 24 hours of procedure as applicable: NA  Relevant diagnostic and radiology test results appropriately labeled and displayed as applicable: Yes  Procedure site(s) marked with provider initials: NA    TIMEOUT:  Time-Out performed immediately prior to starting procedure, including verbal and active participation of all team members addressing the following:Yes  * Correct patient identify  * Confirmed that the correct side and site are marked  * An accurate procedure consent form  * Agreement on the procedure to be done  * Correct patient position  * Relevant images and results are properly labeled and appropriately displayed  * The need to administer antibiotics or fluids for irrigation purposes during the procedure as applicable   * Safety precautions based on patient history or medication use    DURING PROCEDURE: Verification of correct person, site, and procedures any time the responsibility for care of the patient is transferred to another member of the care team.       Prior to injection, verified patient identity using patient's name and date of birth.  Due to  injection administration, patient instructed to remain in clinic for 15 minutes  afterwards, and to report any adverse reaction to me immediately.    Joint injection was performed.      Drug Amount Wasted:  None.  Vial/Syringe: Single dose vial  Expiration Date:  12/1/25      Diogeens Cates, ATC  March 26, 2024

## 2024-03-26 NOTE — LETTER
3/26/2024      RE: Rissa Acosta  [address]  [facility] [address]     Dear Colleague,    Thank you for referring your patient, Rissa Acosta, to the [facility] [place]. Please see a copy of my visit note below.    ASSESSMENT/PLAN:    Moderate DJD right ankle  Plan: Cortisone injection right ankle.  RTC PRN  Pt is a 74 year old female here today for:   HPI:   Right Foot/Ankle pain:   Location: Right lateral ankle   Duration: 2-3 years   Trauma/ Fall? none   Able to walk? Yes  Swelling?  Mild  Bruising? None  Numbness/ Tingling? None   Weakness? none   Instability? None  Snapping/Clicking? Non  Imaging? XR  3/15/24  Treatment? NSAIDS     Right Foot/Ankle:   Inspection: Swelling - Positive over lateral ankle; Bruising - None   Sensation: intact in peroneal, tibial, sural, and saphenous distribution   ROM: Dorsiflexion - 15 degrees; Plantarflexion - 20-25 degrees; Inversion -Normal; Eversion - Normal;    Strength: 5/5 in all motions; Pain w/ resisted None  Bony tenderness: Medial malleolus? - none; Lateral malleolus? - None;   Ligaments Tenderness: ATFL/CFL -None; Deltoid - None; Syndesmosis - None;  Tendons: Posterior Tibialis - None; Peroneals - None; Achilles - None   Maneuvers: Anterior Drawer - Neg; Talar Tilt - Neg.;   Imaging: 3 views right ankle reveal moderate to advanced DJD of tibio-talar joint.    Procedure:  After sterile preparation, the patient's right tibio-talar joint was injected with I ml. Of Kenalog, 40mg./ml and 3 ml. Of 1 % lidocaine, without difficulty.       Past Medical History:   Diagnosis Date    Closed fracture of lumbar vertebra (H) 2/25/2011    DDD (degenerative disc disease), cervical 9/27/2013    DDD (degenerative disc disease), lumbar 9/27/2013    Diabetes (H)     Hepatitis     was treated in the past but not sure what type    Spinal stenosis of lumbar region 6/24/2011    Vitamin D insufficiency 9/27/2013      Past Surgical History:    Procedure Laterality Date    BACK SURGERY  2011    [facility]    CATARACT IOL, RT/LT Right 2005    COLONOSCOPY N/A 3/3/2020    Procedure: Colonoscopy, With Polypectomy And Biopsy;  Surgeon: Arlet Zaragoza DO;  Location: [facility] OR    COLONOSCOPY WITH CO2 INSUFFLATION N/A 3/3/2020    Procedure: COLONOSCOPY, WITH CO2 INSUFFLATION;  Surgeon: Arlet Zaragoza DO;  Location: [facility] OR      Current Outpatient Medications   Medication Sig Dispense Refill    ACCU-CHEK GUIDE test strip USE TO TEST BLOOD SUGAR 1 TIMES DAILY OR AS DIRECTED. 50 strip 1    ascorbic acid (VITAMIN C) 500 MG tablet Take by mouth daily      B Complex Vitamins (VITAMIN-B COMPLEX PO)       blood glucose (BETSY CONTOUR NEXT) test strip Use to test blood sugar 2 times daily or as directed. 100 strip 3    blood glucose (NO BRAND SPECIFIED) test strip Use to test blood sugar 1 times daily or as directed. To accompany: Blood Glucose Monitor Brands: per insurance. 100 strip 6    blood glucose (NO BRAND SPECIFIED) test strip Use to test blood sugar  1 to 2  times daily or as directed. To accompany: Blood Glucose Monitor Brands: per insurance. 100 strip 11    blood glucose calibration (NO BRAND SPECIFIED) solution To accompany: Blood Glucose Monitor Brands: per insurance. 1 Bottle 3    blood glucose monitoring (ACCU-CHEK FASTCLIX) lancets 1 each by In Vitro route daily Use to test blood sugar 1 times daily or as directed. 100 each 11    blood glucose monitoring (BETSY MICROLET) lancets USE TO TEST 1 TO 2 TIMES DAILY, AND AS NEEDED 200 each 3    blood glucose monitoring (NO BRAND SPECIFIED) meter device kit Use to test blood sugar 1 times daily or as directed. Preferred blood glucose meter OR supplies to accompany: Blood Glucose Monitor Brands: per insurance. 1 kit 0    blood glucose monitoring (NO BRAND SPECIFIED) meter device kit Use to test blood sugar 1 to 2  times daily or as directed. Preferred blood glucose meter OR supplies to accompany:  Blood Glucose Monitor Brands: per insurance. 1 kit 0    blood glucose monitoring (ONETOUCH VERIO) meter device kit USE TO TEST BLOOD SUGAR 2 TIMES DAILY OR AS DIRECTED. 1 kit 0    blood glucose monitoring (SOFTCLIX) lancets 1 each by In Vitro route daily USE WITH LANCING DEVICE 1 TIME DAILY. TO ACCOMPANY: BLOOD GLUCOSE MONITOR PER INSURANCE. 100 each 6    calcium carb-cholecalciferol 600-500 MG-UNIT CAPS TAKE 1 CAPSULE BY MOUTH TWICE A  capsule 3    Calcium Carbonate-Vitamin D (CALCIUM-VITAMIN D) 600-125 MG-UNIT TABS TAKE 1 CAPSULE BY MOUTH TWICE A DAY      diclofenac (VOLTAREN) 1 % topical gel APPLY 2 GRAMS TOPICALLY 4 TIMES DAILY 200 g 1    metFORMIN (GLUCOPHAGE XR) 500 MG 24 hr tablet Take 2 tablets (1,000 mg) by mouth daily (with dinner) 180 tablet 1    Multiple Vitamins-Minerals (CHOICEFUL MULTIVITAMIN PO)       naproxen (NAPROSYN) 500 MG tablet TAKE 1 TABLET BY MOUTH TWICE A DAY AS NEEDED 30 tablet 1    Omega-3 Fatty Acids (FISH OIL CONCENTRATE PO)       omeprazole (PRILOSEC OTC) 20 MG EC tablet Take 1 tablet (20 mg) by mouth daily 90 tablet 3    omeprazole 20 MG tablet TAKE 1 TABLET BY MOUTH EVERY DAY 90 tablet 0    ONETOUCH VERIO IQ test strip USE TO TEST BLOOD SUGARS 2 TIMES DAILY OR AS DIRECTED 100 strip 3    ORDER FOR DME Equipment being ordered: walker  Wheeled and seated if needed 1 each 0    ORDER FOR DME Equipment being ordered: TENS 1 Units 0    pravastatin (PRAVACHOL) 20 MG tablet Take 1 tablet (20 mg) by mouth daily 90 tablet 3      No Known Allergies   ROS:   Gen- no fevers/chills   Rheum - no morning stiffness   Derm - no rash/ redness   Neuro - no numbness, no tingling   Remainder of ROS negative.     Exam:   There were no vitals taken for this visit.         Medium Joint Injection: R ankle    Date/Time: 3/26/2024 9:59 AM    Performed by: Fredy Marcus MD  Authorized by: Fredy Marcus MD    Indications:  Pain  Needle Size comment:  23G    Guidance: surface landmarks    Approach:   Anteromedial  Location:  Ankle  Site:  R ankle  Medications:  40 mg triamcinolone 40 MG/ML; 3 mL lidocaine (PF) 1 %  Outcome:  Tolerated well, no immediate complications  Procedure discussed: discussed risks, benefits, and alternatives    Consent Given by:  Patient  Timeout: timeout called immediately prior to procedure    Prep: patient was prepped and draped in usual sterile fashion       Diogenes Cates M.A., [facility], ATC  Certified Athletic Trainer        Again, thank you for allowing me to participate in the care of your patient.      Sincerely,    Fredy Marcus MD

## 2024-03-26 NOTE — PROGRESS NOTES
ASSESSMENT/PLAN:    Moderate DJD right ankle  Plan: Cortisone injection right ankle.  RTC PRN      Pt is a 74 year old female here today for:     HPI:   Right Foot/Ankle pain:   Location: Right lateral ankle   Duration: 2-3 years   Trauma/ Fall? none   Able to walk? Yes  Swelling?  Mild  Bruising? None  Numbness/ Tingling? None   Weakness? none   Instability? None  Snapping/Clicking? Non  Imaging? XR  3/15/24  Treatment? NSAIDS     Right Foot/Ankle:   Inspection: Swelling - Positive over lateral ankle; Bruising - None   Sensation: intact in peroneal, tibial, sural, and saphenous distribution   ROM: Dorsiflexion - 15 degrees; Plantarflexion - 20-25 degrees; Inversion -Normal; Eversion - Normal;    Strength: 5/5 in all motions; Pain w/ resisted None  Bony tenderness: Medial malleolus? - none; Lateral malleolus? - None;   Ligaments Tenderness: ATFL/CFL -None; Deltoid - None; Syndesmosis - None;  Tendons: Posterior Tibialis - None; Peroneals - None; Achilles - None   Maneuvers: Anterior Drawer - Neg; Talar Tilt - Neg.;   Imaging: 3 views right ankle reveal moderate to advanced DJD of tibio-talar joint.    Procedure:  After sterile preparation, the patient's right tibio-talar joint was injected with I ml. Of Kenalog, 40mg./ml and 3 ml. Of 1 % lidocaine, without difficulty.       Past Medical History:   Diagnosis Date    Closed fracture of lumbar vertebra (H) 2/25/2011    DDD (degenerative disc disease), cervical 9/27/2013    DDD (degenerative disc disease), lumbar 9/27/2013    Diabetes (H)     Hepatitis     was treated in the past but not sure what type    Spinal stenosis of lumbar region 6/24/2011    Vitamin D insufficiency 9/27/2013      Past Surgical History:   Procedure Laterality Date    BACK SURGERY  2011    [facility]    CATARACT IOL, RT/LT Right 2005    COLONOSCOPY N/A 3/3/2020    Procedure: Colonoscopy, With Polypectomy And Biopsy;  Surgeon: Arlet Zaragoza DO;  Location: [facility] OR    COLONOSCOPY WITH  CO2 INSUFFLATION N/A 3/3/2020    Procedure: COLONOSCOPY, WITH CO2 INSUFFLATION;  Surgeon: Arlet Zaragoza DO;  Location: [facility] OR      Current Outpatient Medications   Medication Sig Dispense Refill    ACCU-CHEK GUIDE test strip USE TO TEST BLOOD SUGAR 1 TIMES DAILY OR AS DIRECTED. 50 strip 1    ascorbic acid (VITAMIN C) 500 MG tablet Take by mouth daily      B Complex Vitamins (VITAMIN-B COMPLEX PO)       blood glucose (BETSY CONTOUR NEXT) test strip Use to test blood sugar 2 times daily or as directed. 100 strip 3    blood glucose (NO BRAND SPECIFIED) test strip Use to test blood sugar 1 times daily or as directed. To accompany: Blood Glucose Monitor Brands: per insurance. 100 strip 6    blood glucose (NO BRAND SPECIFIED) test strip Use to test blood sugar  1 to 2  times daily or as directed. To accompany: Blood Glucose Monitor Brands: per insurance. 100 strip 11    blood glucose calibration (NO BRAND SPECIFIED) solution To accompany: Blood Glucose Monitor Brands: per insurance. 1 Bottle 3    blood glucose monitoring (ACCU-CHEK FASTCLIX) lancets 1 each by In Vitro route daily Use to test blood sugar 1 times daily or as directed. 100 each 11    blood glucose monitoring (BETSY MICROLET) lancets USE TO TEST 1 TO 2 TIMES DAILY, AND AS NEEDED 200 each 3    blood glucose monitoring (NO BRAND SPECIFIED) meter device kit Use to test blood sugar 1 times daily or as directed. Preferred blood glucose meter OR supplies to accompany: Blood Glucose Monitor Brands: per insurance. 1 kit 0    blood glucose monitoring (NO BRAND SPECIFIED) meter device kit Use to test blood sugar 1 to 2  times daily or as directed. Preferred blood glucose meter OR supplies to accompany: Blood Glucose Monitor Brands: per insurance. 1 kit 0    blood glucose monitoring (ONETOUCH VERIO) meter device kit USE TO TEST BLOOD SUGAR 2 TIMES DAILY OR AS DIRECTED. 1 kit 0    blood glucose monitoring (SOFTCLIX) lancets 1 each by In Vitro route daily USE WITH  LANCING DEVICE 1 TIME DAILY. TO ACCOMPANY: BLOOD GLUCOSE MONITOR PER INSURANCE. 100 each 6    calcium carb-cholecalciferol 600-500 MG-UNIT CAPS TAKE 1 CAPSULE BY MOUTH TWICE A  capsule 3    Calcium Carbonate-Vitamin D (CALCIUM-VITAMIN D) 600-125 MG-UNIT TABS TAKE 1 CAPSULE BY MOUTH TWICE A DAY      diclofenac (VOLTAREN) 1 % topical gel APPLY 2 GRAMS TOPICALLY 4 TIMES DAILY 200 g 1    metFORMIN (GLUCOPHAGE XR) 500 MG 24 hr tablet Take 2 tablets (1,000 mg) by mouth daily (with dinner) 180 tablet 1    Multiple Vitamins-Minerals (CHOICEFUL MULTIVITAMIN PO)       naproxen (NAPROSYN) 500 MG tablet TAKE 1 TABLET BY MOUTH TWICE A DAY AS NEEDED 30 tablet 1    Omega-3 Fatty Acids (FISH OIL CONCENTRATE PO)       omeprazole (PRILOSEC OTC) 20 MG EC tablet Take 1 tablet (20 mg) by mouth daily 90 tablet 3    omeprazole 20 MG tablet TAKE 1 TABLET BY MOUTH EVERY DAY 90 tablet 0    ONETOUCH VERIO IQ test strip USE TO TEST BLOOD SUGARS 2 TIMES DAILY OR AS DIRECTED 100 strip 3    ORDER FOR DME Equipment being ordered: walker  Wheeled and seated if needed 1 each 0    ORDER FOR DME Equipment being ordered: TENS 1 Units 0    pravastatin (PRAVACHOL) 20 MG tablet Take 1 tablet (20 mg) by mouth daily 90 tablet 3      No Known Allergies   ROS:   Gen- no fevers/chills   Rheum - no morning stiffness   Derm - no rash/ redness   Neuro - no numbness, no tingling   Remainder of ROS negative.     Exam:   There were no vitals taken for this visit.         Medium Joint Injection: R ankle    Date/Time: 3/26/2024 9:59 AM    Performed by: Fredy Marcus MD  Authorized by: Fredy Marcus MD    Indications:  Pain  Needle Size comment:  23G    Guidance: surface landmarks    Approach:  Anteromedial  Location:  Ankle  Site:  R ankle  Medications:  40 mg triamcinolone 40 MG/ML; 3 mL lidocaine (PF) 1 %  Outcome:  Tolerated well, no immediate complications  Procedure discussed: discussed risks, benefits, and alternatives    Consent Given by:   Patient  Timeout: timeout called immediately prior to procedure    Prep: patient was prepped and draped in usual sterile fashion       Diogenes Cates M.A., LAT, ATC  Certified Athletic Trainer

## 2024-03-27 ENCOUNTER — TELEPHONE (OUTPATIENT)
Dept: FAMILY MEDICINE | Facility: CLINIC | Age: 74
End: 2024-03-27
Payer: MEDICARE

## 2024-03-27 NOTE — TELEPHONE ENCOUNTER
Called patient via [place]  and left VM to call clinic at [phone].      If patient calls back, please triage patient's low blood sugar level concerns.      Olga Akhtar RN       ----- Message from CAMILO Benavides CNP sent at 3/27/2024  9:48 AM CDT -----  Can we triage this concern as maybe can answer without an in person appointment?  Thanks   ----- Message -----  From: Randee Mann  Sent: 3/27/2024   9:46 AM CDT  To: CAMILO Benavides CNP    Pt said she is having some concern about blood sugar and wants to keep appt for tomorrow.     Please advise       ----- Message -----  From: Cherie Covington APRN CNP  Sent: 3/27/2024   9:19 AM CDT  To: [facility]    Please call as I just saw this patient 2 weeks ago I think is old appointment   Hold the spot as I have another patient message that I would like to put in this spot   Thanks

## 2024-03-27 NOTE — TELEPHONE ENCOUNTER
Mojgan calling back in, writer asked if patient had any specific questions for provider or how we can best help, as patient was just recently seen and provider not sure why appt is scheduled.    Mojgan states that she also asked patient about this, but patient was insistent that she had more questions for provider. Did not want to elaborate on what questions, states she wants to see provider tomorrow. Did let Mojgan know that since patient was recently seen, we can certainly send any questions over to provider to help answer, but patient wants to see provider in person for questions on diabetes. Mojgan states that patient was getting upset with her questions and insists on appt.     Appt kept per patient/family request, routing to provider as FYI. Unable to triage as patient did not want to answer questions.       Renetta Wren, LUPILLON, RN  [facility]

## 2024-03-27 NOTE — TELEPHONE ENCOUNTER
Cherie Covington APRN CNP  [facility]  Please call as I just saw this patient 2 weeks ago I think is old appointment  Hold the spot as I have another patient message that I would like to put in this spot  Thanks

## 2024-03-27 NOTE — TELEPHONE ENCOUNTER
Randee, , came into nurses room and requested that RN try to call patient's daughter back, Mojgan. Consent to communicate on file.     RN called and LVM to call clinic at [phone].      If patient or Mojgan calls back, please triage patient's current symptoms.      Olga Akhtar RN

## 2024-03-28 ENCOUNTER — OFFICE VISIT (OUTPATIENT)
Dept: FAMILY MEDICINE | Facility: CLINIC | Age: 74
End: 2024-03-28
Payer: MEDICARE

## 2024-03-28 VITALS
TEMPERATURE: 97 F | OXYGEN SATURATION: 100 % | BODY MASS INDEX: 26.5 KG/M2 | SYSTOLIC BLOOD PRESSURE: 149 MMHG | HEIGHT: 60 IN | WEIGHT: 135 LBS | DIASTOLIC BLOOD PRESSURE: 79 MMHG | RESPIRATION RATE: 17 BRPM | HEART RATE: 65 BPM

## 2024-03-28 DIAGNOSIS — E11.65 TYPE 2 DIABETES MELLITUS WITH HYPERGLYCEMIA, WITHOUT LONG-TERM CURRENT USE OF INSULIN (H): Primary | ICD-10-CM

## 2024-03-28 DIAGNOSIS — I10 HTN, GOAL BELOW 130/80: ICD-10-CM

## 2024-03-28 PROCEDURE — 99214 OFFICE O/P EST MOD 30 MIN: CPT | Performed by: NURSE PRACTITIONER

## 2024-03-28 RX ORDER — LISINOPRIL 5 MG/1
5 TABLET ORAL DAILY
Qty: 90 TABLET | Refills: 1 | Status: SHIPPED | OUTPATIENT
Start: 2024-03-28 | End: 2024-09-05

## 2024-03-28 ASSESSMENT — PAIN SCALES - GENERAL: PAINLEVEL: NO PAIN (0)

## 2024-03-28 NOTE — PATIENT INSTRUCTIONS
PLAN:   1.   Symptomatic therapy suggested: increase metformin to 2 tablets a day  Will start on lisinopril 5 mg a day for blood pressure.  2.  Orders Placed This Encounter   Medications    lisinopril (ZESTRIL) 5 MG tablet     Sig: Take 1 tablet (5 mg) by mouth daily     Dispense:  90 tablet     Refill:  1     3.  FUTURE LABS:       - Schedule non-fasting labs in 3 months  Make appointment for blood pressure check only  in 3 months   Will follow up and/or notify patient of  results via My Chart to determine further need for followup    Patient needs to follow up in if no improvement,or sooner if worsening of symptoms or other symptoms develop.

## 2024-03-28 NOTE — PROGRESS NOTES
Divina Kwok is a 74 year old, presenting for the following health issues:  Follow Up      3/28/2024     8:05 AM   Additional Questions   Roomed by kerri   Accompanied by self         3/28/2024     8:05 AM   Patient Reported Additional Medications   Patient reports taking the following new medications No     History of Present Illness       Reason for visit:  Diabetes Check    She eats 2-3 servings of fruits and vegetables daily.She consumes 0 sweetened beverage(s) daily.She exercises with enough effort to increase her heart rate 20 to 29 minutes per day.  She exercises with enough effort to increase her heart rate 7 days per week.   She is taking medications regularly.     Follow up   Diabetes Follow-up    How often are you checking your blood sugar? Not at all  What concerns do you have today about your diabetes? None   Do you have any of these symptoms? (Select all that apply)  Numbness in feet  Have you had a diabetic eye exam in the last 12 months? Yes         BP Readings from Last 2 Encounters:   03/28/24 (!) 149/79   03/15/24 124/81     Hemoglobin A1C (%)   Date Value   03/15/2024 8.6 (H)   08/22/2023 8.6 (H)   05/11/2021 6.9 (H)   02/11/2020 6.8 (H)     LDL Cholesterol Calculated (mg/dL)   Date Value   08/22/2023 100   08/26/2022 95   05/11/2021 109 (H)   02/11/2020 86     PROBLEMS TO ADD ON...  Hypertension Follow-up    Do you check your blood pressure regularly outside of the clinic? No   Are you following a low salt diet? Yes  Are your blood pressures ever more than 140 on the top number (systolic) OR more   than 90 on the bottom number (diastolic), for example 140/90? Yes  BP at home has been 140s/80s consitently and BP elevated today here       Labs reviewed in EPIC  BP Readings from Last 3 Encounters:   03/28/24 (!) 150/78   03/15/24 124/81   08/29/23 126/82    Wt Readings from Last 3 Encounters:   03/28/24 61.2 kg (135 lb)   03/15/24 61.4 kg (135 lb 5 oz)   08/29/23 63.8 kg (140 lb 11.2 oz)                   Patient Active Problem List   Diagnosis    CARDIOVASCULAR SCREENING; LDL GOAL LESS THAN 160    DDD (degenerative disc disease), cervical    DDD (degenerative disc disease), lumbar    Vitamin D insufficiency    Elevated LFTs    Positive PPD    Bilateral low back pain without sciatica    Lumbar radiculopathy    Advanced directives, counseling/discussion    Closed fracture of lumbar vertebra (H)    Spinal stenosis of lumbar region    Viral hepatitis    Type 2 diabetes mellitus with hyperglycemia, without long-term current use of insulin (H)    Osteopenia    Neuropathic pain, leg    Exposure to hepatitis B    Spinal stenosis of lumbar region without neurogenic claudication    Osteoporosis    Hyperlipidemia LDL goal <100    Viral hepatitis B chronic (H)     Past Surgical History:   Procedure Laterality Date    BACK SURGERY  2011    [facility]    CATARACT IOL, RT/LT Right 2005    COLONOSCOPY N/A 3/3/2020    Procedure: Colonoscopy, With Polypectomy And Biopsy;  Surgeon: Arlet Zaragoza DO;  Location: [facility] OR    COLONOSCOPY WITH CO2 INSUFFLATION N/A 3/3/2020    Procedure: COLONOSCOPY, WITH CO2 INSUFFLATION;  Surgeon: Arlet Zaragoza DO;  Location: [facility] OR       Social History     Tobacco Use    Smoking status: Never     Passive exposure: Never    Smokeless tobacco: Never   Substance Use Topics    Alcohol use: No     Family History   Problem Relation Age of Onset    Diabetes No family hx of     Macular Degeneration No family hx of     Glaucoma No family hx of     Strabismus No family hx of          Current Outpatient Medications   Medication Sig Dispense Refill    ACCU-CHEK GUIDE test strip USE TO TEST BLOOD SUGAR 1 TIMES DAILY OR AS DIRECTED. 50 strip 1    ascorbic acid (VITAMIN C) 500 MG tablet Take by mouth daily      B Complex Vitamins (VITAMIN-B COMPLEX PO)       blood glucose (BETSY CONTOUR NEXT) test strip Use to test blood sugar 2 times daily or as directed. 100 strip 3    blood glucose  (NO BRAND SPECIFIED) test strip Use to test blood sugar 1 times daily or as directed. To accompany: Blood Glucose Monitor Brands: per insurance. 100 strip 6    blood glucose (NO BRAND SPECIFIED) test strip Use to test blood sugar  1 to 2  times daily or as directed. To accompany: Blood Glucose Monitor Brands: per insurance. 100 strip 11    blood glucose calibration (NO BRAND SPECIFIED) solution To accompany: Blood Glucose Monitor Brands: per insurance. 1 Bottle 3    blood glucose monitoring (ACCU-CHEK FASTCLIX) lancets 1 each by In Vitro route daily Use to test blood sugar 1 times daily or as directed. 100 each 11    blood glucose monitoring (BETSY MICROLET) lancets USE TO TEST 1 TO 2 TIMES DAILY, AND AS NEEDED 200 each 3    blood glucose monitoring (NO BRAND SPECIFIED) meter device kit Use to test blood sugar 1 times daily or as directed. Preferred blood glucose meter OR supplies to accompany: Blood Glucose Monitor Brands: per insurance. 1 kit 0    blood glucose monitoring (NO BRAND SPECIFIED) meter device kit Use to test blood sugar 1 to 2  times daily or as directed. Preferred blood glucose meter OR supplies to accompany: Blood Glucose Monitor Brands: per insurance. 1 kit 0    blood glucose monitoring (ONETOUCH VERIO) meter device kit USE TO TEST BLOOD SUGAR 2 TIMES DAILY OR AS DIRECTED. 1 kit 0    blood glucose monitoring (SOFTCLIX) lancets 1 each by In Vitro route daily USE WITH LANCING DEVICE 1 TIME DAILY. TO ACCOMPANY: BLOOD GLUCOSE MONITOR PER INSURANCE. 100 each 6    calcium carb-cholecalciferol 600-500 MG-UNIT CAPS TAKE 1 CAPSULE BY MOUTH TWICE A  capsule 3    Calcium Carbonate-Vitamin D (CALCIUM-VITAMIN D) 600-125 MG-UNIT TABS TAKE 1 CAPSULE BY MOUTH TWICE A DAY      diclofenac (VOLTAREN) 1 % topical gel APPLY 2 GRAMS TOPICALLY 4 TIMES DAILY 200 g 1    metFORMIN (GLUCOPHAGE XR) 500 MG 24 hr tablet Take 2 tablets (1,000 mg) by mouth daily (with dinner) 180 tablet 1    Multiple Vitamins-Minerals  (CHOICEFUL MULTIVITAMIN PO)       naproxen (NAPROSYN) 500 MG tablet TAKE 1 TABLET BY MOUTH TWICE A DAY AS NEEDED 30 tablet 1    Omega-3 Fatty Acids (FISH OIL CONCENTRATE PO)       omeprazole (PRILOSEC OTC) 20 MG EC tablet Take 1 tablet (20 mg) by mouth daily 90 tablet 3    omeprazole 20 MG tablet TAKE 1 TABLET BY MOUTH EVERY DAY 90 tablet 0    ONETOUCH VERIO IQ test strip USE TO TEST BLOOD SUGARS 2 TIMES DAILY OR AS DIRECTED 100 strip 3    ORDER FOR DME Equipment being ordered: walker  Wheeled and seated if needed 1 each 0    ORDER FOR DME Equipment being ordered: TENS 1 Units 0    pravastatin (PRAVACHOL) 20 MG tablet Take 1 tablet (20 mg) by mouth daily 90 tablet 3     No Known Allergies  Recent Labs   Lab Test 03/15/24  0847 09/07/23  0957 08/29/23  1227 08/22/23  0717 08/26/22  0800 05/11/21  1110 02/11/20  0819   A1C 8.6*  --   --  8.6* 7.4* 6.9* 6.8*   LDL  --   --   --  100 95 109* 86   HDL  --   --   --  53 52 60 57   TRIG  --   --   --  93 115 84 117   ALT  --  46 70* 72*  --  40 58*   CR 0.72  --   --  0.61  --  0.67 0.59   GFRESTIMATED 87  --   --  >90  --  88 >90   GFRESTBLACK  --   --   --   --   --  >90 >90   POTASSIUM 4.5  --   --  4.4  --  4.1 4.5   TSH  --   --   --  0.63 0.92  --  0.60              Review of Systems  Constitutional, HEENT, cardiovascular, pulmonary, GI, , musculoskeletal, neuro, skin, endocrine and psych systems are negative, except as otherwise noted.      Objective    BP (!) 148/82 (BP Location: Right arm, Patient Position: Sitting, Cuff Size: Adult Regular)   Pulse 63   Temp 97  F (36.1  C) (Temporal)   Resp 17   Ht 1.524 m (5')   Wt 61.2 kg (135 lb)   SpO2 100%   BMI 26.37 kg/m    Body mass index is 26.37 kg/m . Second BP: 150/78  .bp    Physical Exam   GENERAL: Patient is well nourished, well developed,in no apparent distress, non-toxic, in no respiratory distress and acyanotic, alert, cooperative, and well hydrated  EYES: Eyes grossly normal to inspection and  conjunctivae and sclerae normal  HENT:ear canals and TM's normal and nose and mouth without ulcers or lesions   NECK:normal and supple  CARDIAC:regular rates and rhythm, no murmur, click or rub, and no irregular beats  without LE edema bilaterally  RESP: normal respiratory rate and rhythm, lungs clear to auscultation  unlabored respirations, no intercostal retractions or accessory muscle use  ABD:soft, nontender  SKIN: Skin color, texture, turgor normal. No rashes or lesions.  MS: extremities normal- no gross deformities noted, gait normal, and normal muscle tone  NEURO: mentation intact and speech normal  PSYCH: Alert, oriented, thought content appropriate,mentation appears normal., affect and mood normal        Office Visit on 03/15/2024   Component Date Value Ref Range Status    Sodium 03/15/2024 140  135 - 145 mmol/L Final    Reference intervals for this test were updated on 09/26/2023 to more accurately reflect our healthy population. There may be differences in the flagging of prior results with similar values performed with this method. Interpretation of those prior results can be made in the context of the updated reference intervals.     Potassium 03/15/2024 4.5  3.4 - 5.3 mmol/L Final    Chloride 03/15/2024 104  98 - 107 mmol/L Final    Carbon Dioxide (CO2) 03/15/2024 26  22 - 29 mmol/L Final    Anion Gap 03/15/2024 10  7 - 15 mmol/L Final    Urea Nitrogen 03/15/2024 13.0  8.0 - 23.0 mg/dL Final    Creatinine 03/15/2024 0.72  0.51 - 0.95 mg/dL Final    GFR Estimate 03/15/2024 87  >60 mL/min/1.73m2 Final    Calcium 03/15/2024 8.8  8.8 - 10.2 mg/dL Final    Glucose 03/15/2024 124 (H)  70 - 99 mg/dL Final    Hemoglobin A1C 03/15/2024 8.6 (H)  0.0 - 5.6 % Final     Assessment & Plan     Type 2 diabetes mellitus with hyperglycemia, without long-term current use of insulin (H)  glycohemoglobin monitoring discussed and long term diabetic complications discussed  Medication changes as follows: Prescribing  Metformin  1000 mg  daily   Recheck in 3 months, sooner should new symptoms or   problems arise.      HTN, goal below 130/80  HTN Plan:  1)  Medication: begin: lisinopril 5 mg a day  2)  Dietary sodium restriction  3)  Regular aerobic exercise  4)  Recheck in 3 months, sooner should new symptoms or   problems arise.  5) See todays orders.    Patient Education: Reviewed risks of hypertension and principles of   treatment.    - lisinopril (ZESTRIL) 5 MG tablet  Dispense: 90 tablet; Refill: 1      Review of external notes as documented elsewhere in note  Ordering of each unique test  Prescription drug management  No LOS data to display   Time spent by me doing chart review, history and exam, documentation and further activities per the note        See Patient Instructions  Patient Instructions   PLAN:   1.   Symptomatic therapy suggested: increase metformin to 2 tablets a day  Will start on lisinopril 5 mg a day for blood pressure.  2.  Orders Placed This Encounter   Medications    lisinopril (ZESTRIL) 5 MG tablet     Sig: Take 1 tablet (5 mg) by mouth daily     Dispense:  90 tablet     Refill:  1     3.  FUTURE LABS:       - Schedule non-fasting labs in 3 months  Make appointment for blood pressure check only  in 3 months   Will follow up and/or notify patient of  results via My Chart to determine further need for followup    Patient needs to follow up in if no improvement,or sooner if worsening of symptoms or other symptoms develop.              Signed Electronically by: CAMILO Benavides CNP

## 2024-04-01 NOTE — PROGRESS NOTES
Sports Medicine Clinic           ASSESSMENT and PLAN:     Rissa was seen today for pain and pain.    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 Discussed treatment options including topical NSAIDs, oral NSAIDs and targeted CSI of specific joints that are most bothersome. She has topical voltaren at home that she is going to try to use, however I did recommend she consider CSI especially of there right thumb IP as that seems to be the most symptomatic joint.   - topical voltaren  - consider CSI in the future, especially of the right thumb IP      Return sooner if develops new or worsening symptoms.    Options for treatment and/or follow-up care were reviewed with the patient was actively involved in the decision making process. Patient verbalized understanding and was in agreement with the plan.      Jacey Ortiz MD, [facility]  Primary Care Sports Medicine           SUBJECTIVE       Rissa Acosta is a 74 year old female presenting to clinic today with a chief complaint of bilateral hand pain, referred by self. She is right hand dominant.     Onset: 5 years(s) ago. Reports insidious onset without acute precipitating event.  Location of Pain: bilateral hand, equal in pain. DIPs of most fingers  Rating of Pain at worst: 5/10  Rating of Pain Currently: 3/10  Worsened by: use of her hands   Better with: Advil, naproxen   Treatments tried: rest/activity avoidance and ibuprofen  Associated symptoms: swelling, denies numbness/tingling   Orthopedic history: NO  Relevant surgical history: NO  Social history: social history: retired    Her right hand hurts more than her left. She is right handed. Her right thumb hurts but her left thumb is okay. NSAIDs help but when she has taken them in the past. She is interested to know what she should do or if there is anything she should avoid for her hands.     PMH,  Medications and Allergies were reviewed and updated as needed.    ROS:  As noted above otherwise negative.    Patient Active Problem List   Diagnosis    CARDIOVASCULAR SCREENING; LDL GOAL LESS THAN 160    DDD (degenerative disc disease), cervical    DDD (degenerative disc disease), lumbar    Vitamin D insufficiency    Elevated LFTs    Positive PPD    Bilateral low back pain without sciatica    Lumbar radiculopathy    Advanced directives, counseling/discussion    Closed fracture of lumbar vertebra (H)    Spinal stenosis of lumbar region    Viral hepatitis    Type 2 diabetes mellitus with hyperglycemia, without long-term current use of insulin (H)    Osteopenia    Neuropathic pain, leg    Exposure to hepatitis B    Spinal stenosis of lumbar region without neurogenic claudication    Osteoporosis    Hyperlipidemia LDL goal <100    Viral hepatitis B chronic (H)    HTN, goal below 130/80       Current Outpatient Medications   Medication Sig Dispense Refill    ACCU-CHEK GUIDE test strip USE TO TEST BLOOD SUGAR 1 TIMES DAILY OR AS DIRECTED. 50 strip 1    ascorbic acid (VITAMIN C) 500 MG tablet Take by mouth daily      B Complex Vitamins (VITAMIN-B COMPLEX PO)       blood glucose (BETSY CONTOUR NEXT) test strip Use to test blood sugar 2 times daily or as directed. 100 strip 3    blood glucose (NO BRAND SPECIFIED) test strip Use to test blood sugar 1 times daily or as directed. To accompany: Blood Glucose Monitor Brands: per insurance. 100 strip 6    blood glucose (NO BRAND SPECIFIED) test strip Use to test blood sugar  1 to 2  times daily or as directed. To accompany: Blood Glucose Monitor Brands: per insurance. 100 strip 11    blood glucose calibration (NO BRAND SPECIFIED) solution To accompany: Blood Glucose Monitor Brands: per insurance. 1 Bottle 3    blood glucose monitoring (ACCU-CHEK FASTCLIX) lancets 1 each by In Vitro route daily Use to test blood sugar 1 times daily or as directed. 100 each 11    blood glucose  monitoring (BETSY MICROLET) lancets USE TO TEST 1 TO 2 TIMES DAILY, AND AS NEEDED 200 each 3    blood glucose monitoring (NO BRAND SPECIFIED) meter device kit Use to test blood sugar 1 times daily or as directed. Preferred blood glucose meter OR supplies to accompany: Blood Glucose Monitor Brands: per insurance. 1 kit 0    blood glucose monitoring (NO BRAND SPECIFIED) meter device kit Use to test blood sugar 1 to 2  times daily or as directed. Preferred blood glucose meter OR supplies to accompany: Blood Glucose Monitor Brands: per insurance. 1 kit 0    blood glucose monitoring (ONETOUCH VERIO) meter device kit USE TO TEST BLOOD SUGAR 2 TIMES DAILY OR AS DIRECTED. 1 kit 0    blood glucose monitoring (SOFTCLIX) lancets 1 each by In Vitro route daily USE WITH LANCING DEVICE 1 TIME DAILY. TO ACCOMPANY: BLOOD GLUCOSE MONITOR PER INSURANCE. 100 each 6    calcium carb-cholecalciferol 600-500 MG-UNIT CAPS TAKE 1 CAPSULE BY MOUTH TWICE A  capsule 3    Calcium Carbonate-Vitamin D (CALCIUM-VITAMIN D) 600-125 MG-UNIT TABS TAKE 1 CAPSULE BY MOUTH TWICE A DAY      diclofenac (VOLTAREN) 1 % topical gel APPLY 2 GRAMS TOPICALLY 4 TIMES DAILY 200 g 1    lisinopril (ZESTRIL) 5 MG tablet Take 1 tablet (5 mg) by mouth daily 90 tablet 1    metFORMIN (GLUCOPHAGE XR) 500 MG 24 hr tablet Take 2 tablets (1,000 mg) by mouth daily (with dinner) 180 tablet 1    Multiple Vitamins-Minerals (CHOICEFUL MULTIVITAMIN PO)       naproxen (NAPROSYN) 500 MG tablet TAKE 1 TABLET BY MOUTH TWICE A DAY AS NEEDED 30 tablet 1    Omega-3 Fatty Acids (FISH OIL CONCENTRATE PO)       omeprazole (PRILOSEC OTC) 20 MG EC tablet Take 1 tablet (20 mg) by mouth daily 90 tablet 3    omeprazole 20 MG tablet TAKE 1 TABLET BY MOUTH EVERY DAY 90 tablet 0    ONETOUCH VERIO IQ test strip USE TO TEST BLOOD SUGARS 2 TIMES DAILY OR AS DIRECTED 100 strip 3    ORDER FOR DME Equipment being ordered: walker  Wheeled and seated if needed 1 each 0    ORDER FOR DME Equipment  being ordered: TENS 1 Units 0    pravastatin (PRAVACHOL) 20 MG tablet Take 1 tablet (20 mg) by mouth daily 90 tablet 3            OBJECTIVE:       Vitals: There were no vitals filed for this visit.  BMI: There is no height or weight on file to calculate BMI.    Gen:  Well nourished and in no acute distress  HEENT: Extraocular movement intact  Neck: Supple  Pulm:  Breathing Comfortably. No increased respiratory effort.  Psych: Euthymic. Appropriately answers questions    MSK:   BILATERAL HAND  Inspection:    No swelling, bruising, discoloration. Significant osteoarthritis and deformity of bilateral DIP 2-4 and right thumb IP  Palpation:   Thumb: IP right thumb   Fingers: DIP joints bilaterally  Range of Motion:    Full active flexion and extension at MCP, PIP, and DIP joints; normal finger cascade without malrotation.  Wrist pronation, supination, and ulnar/radial deviation normal.    Imaging was personally reviewed and interpreted by me.   Narrative & Impression   HAND BILATERAL THREE OR MORE VIEWS  DATE/TIME: 3/15/2024 8:58 AM     INDICATION: Bilateral hand pain.  COMPARISON: None available.                                                                       IMPRESSION: No acute displaced hand fracture or dislocation is  identified on either side. Advanced polyarticular interphalangeal  joint arthritic change of the fingers, particularly at the DIP joints  of the index, long and ring fingers and small finger PIP joints  bilaterally. 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 Diffuse  bone demineralization. No substantial soft tissue swelling. No  erosion.

## 2024-04-02 ENCOUNTER — OFFICE VISIT (OUTPATIENT)
Dept: ORTHOPEDICS | Facility: CLINIC | Age: 74
End: 2024-04-02
Payer: MEDICARE

## 2024-04-02 ENCOUNTER — PRE VISIT (OUTPATIENT)
Dept: ORTHOPEDICS | Facility: CLINIC | Age: 74
End: 2024-04-02

## 2024-04-02 DIAGNOSIS — M19.049 HAND ARTHRITIS: Primary | ICD-10-CM

## 2024-04-02 PROCEDURE — 99203 OFFICE O/P NEW LOW 30 MIN: CPT | Performed by: STUDENT IN AN ORGANIZED HEALTH CARE EDUCATION/TRAINING PROGRAM

## 2024-04-02 NOTE — LETTER
4/2/2024      RE: Rissa Acosta  [address]  [facility] [address]     Dear Colleague,    Thank you for referring your patient, Rissa Acosta, to the [facility] [place]. Please see a copy of my visit note below.    Sports Medicine Clinic           ASSESSMENT and PLAN:     Rissa was seen today for pain and pain.    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 Discussed treatment options including topical NSAIDs, oral NSAIDs and targeted CSI of specific joints that are most bothersome. She has topical voltaren at home that she is going to try to use, however I did recommend she consider CSI especially of there right thumb IP as that seems to be the most symptomatic joint.   - topical voltaren  - consider CSI in the future, especially of the right thumb IP      Return sooner if develops new or worsening symptoms.    Options for treatment and/or follow-up care were reviewed with the patient was actively involved in the decision making process. Patient verbalized understanding and was in agreement with the plan.      Jacey Ortiz MD, [facility]  Primary Care Sports Medicine           SUBJECTIVE       Rissa Acosta is a 74 year old female presenting to clinic today with a chief complaint of bilateral hand pain, referred by self. She is right hand dominant.     Onset: 5 years(s) ago. Reports insidious onset without acute precipitating event.  Location of Pain: bilateral hand, equal in pain. DIPs of most fingers  Rating of Pain at worst: 5/10  Rating of Pain Currently: 3/10  Worsened by: use of her hands   Better with: Advil, naproxen   Treatments tried: rest/activity avoidance and ibuprofen  Associated symptoms: swelling, denies numbness/tingling   Orthopedic history: NO  Relevant surgical history: NO  Social history: social history: retired    Her right  hand hurts more than her left. She is right handed. Her right thumb hurts but her left thumb is okay. NSAIDs help but when she has taken them in the past. She is interested to know what she should do or if there is anything she should avoid for her hands.     PMH, Medications and Allergies were reviewed and updated as needed.    ROS:  As noted above otherwise negative.    Patient Active Problem List   Diagnosis     CARDIOVASCULAR SCREENING; LDL GOAL LESS THAN 160     DDD (degenerative disc disease), cervical     DDD (degenerative disc disease), lumbar     Vitamin D insufficiency     Elevated LFTs     Positive PPD     Bilateral low back pain without sciatica     Lumbar radiculopathy     Advanced directives, counseling/discussion     Closed fracture of lumbar vertebra (H)     Spinal stenosis of lumbar region     Viral hepatitis     Type 2 diabetes mellitus with hyperglycemia, without long-term current use of insulin (H)     Osteopenia     Neuropathic pain, leg     Exposure to hepatitis B     Spinal stenosis of lumbar region without neurogenic claudication     Osteoporosis     Hyperlipidemia LDL goal <100     Viral hepatitis B chronic (H)     HTN, goal below 130/80       Current Outpatient Medications   Medication Sig Dispense Refill     ACCU-CHEK GUIDE test strip USE TO TEST BLOOD SUGAR 1 TIMES DAILY OR AS DIRECTED. 50 strip 1     ascorbic acid (VITAMIN C) 500 MG tablet Take by mouth daily       B Complex Vitamins (VITAMIN-B COMPLEX PO)        blood glucose (BETSY CONTOUR NEXT) test strip Use to test blood sugar 2 times daily or as directed. 100 strip 3     blood glucose (NO BRAND SPECIFIED) test strip Use to test blood sugar 1 times daily or as directed. To accompany: Blood Glucose Monitor Brands: per insurance. 100 strip 6     blood glucose (NO BRAND SPECIFIED) test strip Use to test blood sugar  1 to 2  times daily or as directed. To accompany: Blood Glucose Monitor Brands: per insurance. 100 strip 11     blood  glucose calibration (NO BRAND SPECIFIED) solution To accompany: Blood Glucose Monitor Brands: per insurance. 1 Bottle 3     blood glucose monitoring (ACCU-CHEK FASTCLIX) lancets 1 each by In Vitro route daily Use to test blood sugar 1 times daily or as directed. 100 each 11     blood glucose monitoring (BETSY MICROLET) lancets USE TO TEST 1 TO 2 TIMES DAILY, AND AS NEEDED 200 each 3     blood glucose monitoring (NO BRAND SPECIFIED) meter device kit Use to test blood sugar 1 times daily or as directed. Preferred blood glucose meter OR supplies to accompany: Blood Glucose Monitor Brands: per insurance. 1 kit 0     blood glucose monitoring (NO BRAND SPECIFIED) meter device kit Use to test blood sugar 1 to 2  times daily or as directed. Preferred blood glucose meter OR supplies to accompany: Blood Glucose Monitor Brands: per insurance. 1 kit 0     blood glucose monitoring (ONETOUCH VERIO) meter device kit USE TO TEST BLOOD SUGAR 2 TIMES DAILY OR AS DIRECTED. 1 kit 0     blood glucose monitoring (SOFTCLIX) lancets 1 each by In Vitro route daily USE WITH LANCING DEVICE 1 TIME DAILY. TO ACCOMPANY: BLOOD GLUCOSE MONITOR PER INSURANCE. 100 each 6     calcium carb-cholecalciferol 600-500 MG-UNIT CAPS TAKE 1 CAPSULE BY MOUTH TWICE A  capsule 3     Calcium Carbonate-Vitamin D (CALCIUM-VITAMIN D) 600-125 MG-UNIT TABS TAKE 1 CAPSULE BY MOUTH TWICE A DAY       diclofenac (VOLTAREN) 1 % topical gel APPLY 2 GRAMS TOPICALLY 4 TIMES DAILY 200 g 1     lisinopril (ZESTRIL) 5 MG tablet Take 1 tablet (5 mg) by mouth daily 90 tablet 1     metFORMIN (GLUCOPHAGE XR) 500 MG 24 hr tablet Take 2 tablets (1,000 mg) by mouth daily (with dinner) 180 tablet 1     Multiple Vitamins-Minerals (CHOICEFUL MULTIVITAMIN PO)        naproxen (NAPROSYN) 500 MG tablet TAKE 1 TABLET BY MOUTH TWICE A DAY AS NEEDED 30 tablet 1     Omega-3 Fatty Acids (FISH OIL CONCENTRATE PO)        omeprazole (PRILOSEC OTC) 20 MG EC tablet Take 1 tablet (20 mg) by mouth  daily 90 tablet 3     omeprazole 20 MG tablet TAKE 1 TABLET BY MOUTH EVERY DAY 90 tablet 0     ONETOUCH VERIO IQ test strip USE TO TEST BLOOD SUGARS 2 TIMES DAILY OR AS DIRECTED 100 strip 3     ORDER FOR DME Equipment being ordered: walker  Wheeled and seated if needed 1 each 0     ORDER FOR DME Equipment being ordered: TENS 1 Units 0     pravastatin (PRAVACHOL) 20 MG tablet Take 1 tablet (20 mg) by mouth daily 90 tablet 3            OBJECTIVE:       Vitals: There were no vitals filed for this visit.  BMI: There is no height or weight on file to calculate BMI.    Gen:  Well nourished and in no acute distress  HEENT: Extraocular movement intact  Neck: Supple  Pulm:  Breathing Comfortably. No increased respiratory effort.  Psych: Euthymic. Appropriately answers questions    MSK:   BILATERAL HAND  Inspection:    No swelling, bruising, discoloration. Significant osteoarthritis and deformity of bilateral DIP 2-4 and right thumb IP  Palpation:   Thumb: IP right thumb   Fingers: DIP joints bilaterally  Range of Motion:    Full active flexion and extension at MCP, PIP, and DIP joints; normal finger cascade without malrotation.  Wrist pronation, supination, and ulnar/radial deviation normal.    Imaging was personally reviewed and interpreted by me.   Narrative & Impression   HAND BILATERAL THREE OR MORE VIEWS  DATE/TIME: 3/15/2024 8:58 AM     INDICATION: Bilateral hand pain.  COMPARISON: None available.                                                                       IMPRESSION: No acute displaced hand fracture or dislocation is  identified on either side. Advanced polyarticular interphalangeal  joint arthritic change of the fingers, particularly at the DIP joints  of the index, long and ring fingers and small finger PIP joints  bilaterally. 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 Diffuse  bone demineralization. No substantial soft tissue swelling. No  erosion.            Again, thank you for allowing me to  participate in the care of your patient.      Sincerely,    Jacey Ortiz MD

## 2024-04-02 NOTE — TELEPHONE ENCOUNTER
DIAGNOSIS: XR 3/15/24. Bilateral hand pain - DIP 2nd-4th fingers  Please use iPad or phones [phone] to reach  and follow prompts     APPOINTMENT DATE: 4.2.24   NOTES STATUS DETAILS   OFFICE NOTE from referring provider Internal 3.15.24  Adria HARRISON   MEDICATION LIST Internal    DEXA (osteoporosis/bone health) Internal    XRAYS (IMAGES & REPORTS) Internal 3.15.24  XR Hand Gavino

## 2024-04-09 DIAGNOSIS — M25.571 RIGHT ANKLE PAIN, UNSPECIFIED CHRONICITY: ICD-10-CM

## 2024-04-09 RX ORDER — NAPROXEN 500 MG/1
500 TABLET ORAL 2 TIMES DAILY PRN
Qty: 30 TABLET | Refills: 1 | Status: SHIPPED | OUTPATIENT
Start: 2024-04-09 | End: 2024-05-06

## 2024-05-04 DIAGNOSIS — M25.571 RIGHT ANKLE PAIN, UNSPECIFIED CHRONICITY: ICD-10-CM

## 2024-05-06 RX ORDER — NAPROXEN 500 MG/1
500 TABLET ORAL 2 TIMES DAILY PRN
Qty: 30 TABLET | Refills: 1 | Status: SHIPPED | OUTPATIENT
Start: 2024-05-06 | End: 2024-09-05

## 2024-06-28 ENCOUNTER — LAB (OUTPATIENT)
Dept: LAB | Facility: CLINIC | Age: 74
End: 2024-06-28
Payer: MEDICARE

## 2024-06-28 ENCOUNTER — OFFICE VISIT (OUTPATIENT)
Dept: FAMILY MEDICINE | Facility: CLINIC | Age: 74
End: 2024-06-28
Payer: MEDICARE

## 2024-06-28 VITALS
HEART RATE: 80 BPM | TEMPERATURE: 97 F | SYSTOLIC BLOOD PRESSURE: 130 MMHG | OXYGEN SATURATION: 97 % | HEIGHT: 60 IN | DIASTOLIC BLOOD PRESSURE: 86 MMHG | RESPIRATION RATE: 15 BRPM | BODY MASS INDEX: 25.7 KG/M2 | WEIGHT: 130.9 LBS

## 2024-06-28 DIAGNOSIS — I10 HTN, GOAL BELOW 130/80: ICD-10-CM

## 2024-06-28 DIAGNOSIS — E78.5 HYPERLIPIDEMIA LDL GOAL <100: ICD-10-CM

## 2024-06-28 DIAGNOSIS — E11.65 TYPE 2 DIABETES MELLITUS WITH HYPERGLYCEMIA, WITHOUT LONG-TERM CURRENT USE OF INSULIN (H): Primary | ICD-10-CM

## 2024-06-28 DIAGNOSIS — E11.65 TYPE 2 DIABETES MELLITUS WITH HYPERGLYCEMIA, WITHOUT LONG-TERM CURRENT USE OF INSULIN (H): ICD-10-CM

## 2024-06-28 LAB
ANION GAP SERPL CALCULATED.3IONS-SCNC: 9 MMOL/L (ref 7–15)
BUN SERPL-MCNC: 13.5 MG/DL (ref 8–23)
CALCIUM SERPL-MCNC: 9.2 MG/DL (ref 8.8–10.2)
CHLORIDE SERPL-SCNC: 105 MMOL/L (ref 98–107)
CREAT SERPL-MCNC: 0.67 MG/DL (ref 0.51–0.95)
DEPRECATED HCO3 PLAS-SCNC: 26 MMOL/L (ref 22–29)
EGFRCR SERPLBLD CKD-EPI 2021: >90 ML/MIN/1.73M2
GLUCOSE SERPL-MCNC: 162 MG/DL (ref 70–99)
HBA1C MFR BLD: 7.6 % (ref 0–5.6)
POTASSIUM SERPL-SCNC: 4.6 MMOL/L (ref 3.4–5.3)
SODIUM SERPL-SCNC: 140 MMOL/L (ref 135–145)

## 2024-06-28 PROCEDURE — 36415 COLL VENOUS BLD VENIPUNCTURE: CPT

## 2024-06-28 PROCEDURE — 83036 HEMOGLOBIN GLYCOSYLATED A1C: CPT

## 2024-06-28 PROCEDURE — 80048 BASIC METABOLIC PNL TOTAL CA: CPT

## 2024-06-28 PROCEDURE — 99214 OFFICE O/P EST MOD 30 MIN: CPT | Performed by: NURSE PRACTITIONER

## 2024-06-28 PROCEDURE — G2211 COMPLEX E/M VISIT ADD ON: HCPCS | Performed by: NURSE PRACTITIONER

## 2024-06-28 ASSESSMENT — PAIN SCALES - GENERAL: PAINLEVEL: SEVERE PAIN (7)

## 2024-06-28 NOTE — PROGRESS NOTES
Divina Kwok is a 74 year old, presenting for the following health issues:  Follow Up        6/28/2024    10:29 AM   Additional Questions   Roomed by Evens   Accompanied by Mojgan - Daughter in law         6/28/2024    10:29 AM   Patient Reported Additional Medications   Patient reports taking the following new medications None     HPI     Diabetes Follow-up    How often are you checking your blood sugar? One time daily  What time of day are you checking your blood sugars (select all that apply)?  Before meals  Have you had any blood sugars above 200?  Yes not sure when   Have you had any blood sugars below 70?  No  What symptoms do you notice when your blood sugar is low?  Shaky  What concerns do you have today about your diabetes? Other: stomach upset with the metformin    Do you have any of these symptoms? (Select all that apply)  No numbness or tingling in feet.  No redness, sores or blisters on feet.  No complaints of excessive thirst.  No reports of blurry vision.  No significant changes to weight.  Have you had a diabetic eye exam in the last 12 months? Yes     States the metformin is upsetting her stomach and she stopped it about 2 weeks ago and stomach was better and would like to try a different medication.     BP Readings from Last 2 Encounters:   06/28/24 130/86   03/28/24 (!) 149/79     Hemoglobin A1C (%)   Date Value   06/28/2024 7.6 (H)   03/15/2024 8.6 (H)   05/11/2021 6.9 (H)   02/11/2020 6.8 (H)     LDL Cholesterol Calculated (mg/dL)   Date Value   08/22/2023 100   08/26/2022 95   05/11/2021 109 (H)   02/11/2020 86       Hypertension Follow-up    Do you check your blood pressure regularly outside of the clinic? No   Are you following a low salt diet? Yes  Are your blood pressures ever more than 140 on the top number (systolic) OR more   than 90 on the bottom number (diastolic), for example 140/90? No    Lab work is in process  Labs reviewed in EPIC  BP Readings from Last 3 Encounters:    06/28/24 130/86   03/28/24 (!) 149/79   03/15/24 124/81    Wt Readings from Last 3 Encounters:   06/28/24 59.4 kg (130 lb 14.4 oz)   03/28/24 61.2 kg (135 lb)   03/15/24 61.4 kg (135 lb 5 oz)                  Patient Active Problem List   Diagnosis    CARDIOVASCULAR SCREENING; LDL GOAL LESS THAN 160    DDD (degenerative disc disease), cervical    DDD (degenerative disc disease), lumbar    Vitamin D insufficiency    Elevated LFTs    Positive PPD    Bilateral low back pain without sciatica    Lumbar radiculopathy    Closed fracture of lumbar vertebra (H)    Spinal stenosis of lumbar region    Viral hepatitis    Type 2 diabetes mellitus with hyperglycemia, without long-term current use of insulin (H)    Osteopenia    Neuropathic pain, leg    Exposure to hepatitis B    Spinal stenosis of lumbar region without neurogenic claudication    Osteoporosis    Hyperlipidemia LDL goal <100    Viral hepatitis B chronic (H)    HTN, goal below 130/80     Past Surgical History:   Procedure Laterality Date    BACK SURGERY  2011    [facility]    CATARACT IOL, RT/LT Right 2005    COLONOSCOPY N/A 3/3/2020    Procedure: Colonoscopy, With Polypectomy And Biopsy;  Surgeon: Arlet Zaragoza DO;  Location: [facility] OR    COLONOSCOPY WITH CO2 INSUFFLATION N/A 3/3/2020    Procedure: COLONOSCOPY, WITH CO2 INSUFFLATION;  Surgeon: Arlet Zaragoza DO;  Location: [facility] OR       Social History     Tobacco Use    Smoking status: Never     Passive exposure: Never    Smokeless tobacco: Never   Substance Use Topics    Alcohol use: No     Family History   Problem Relation Age of Onset    Diabetes No family hx of     Macular Degeneration No family hx of     Glaucoma No family hx of     Strabismus No family hx of          Current Outpatient Medications   Medication Sig Dispense Refill    ACCU-CHEK GUIDE test strip USE TO TEST BLOOD SUGAR 1 TIMES DAILY OR AS DIRECTED. 50 strip 1    ascorbic acid (VITAMIN C) 500 MG tablet Take by mouth daily      B  Complex Vitamins (VITAMIN-B COMPLEX PO)       blood glucose (BETSY CONTOUR NEXT) test strip Use to test blood sugar 2 times daily or as directed. 100 strip 3    blood glucose (NO BRAND SPECIFIED) test strip Use to test blood sugar 1 times daily or as directed. To accompany: Blood Glucose Monitor Brands: per insurance. 100 strip 6    blood glucose (NO BRAND SPECIFIED) test strip Use to test blood sugar  1 to 2  times daily or as directed. To accompany: Blood Glucose Monitor Brands: per insurance. 100 strip 11    blood glucose calibration (NO BRAND SPECIFIED) solution To accompany: Blood Glucose Monitor Brands: per insurance. 1 Bottle 3    blood glucose monitoring (ACCU-CHEK FASTCLIX) lancets 1 each by In Vitro route daily Use to test blood sugar 1 times daily or as directed. 100 each 11    blood glucose monitoring (BETSY MICROLET) lancets USE TO TEST 1 TO 2 TIMES DAILY, AND AS NEEDED 200 each 3    blood glucose monitoring (NO BRAND SPECIFIED) meter device kit Use to test blood sugar 1 times daily or as directed. Preferred blood glucose meter OR supplies to accompany: Blood Glucose Monitor Brands: per insurance. 1 kit 0    blood glucose monitoring (NO BRAND SPECIFIED) meter device kit Use to test blood sugar 1 to 2  times daily or as directed. Preferred blood glucose meter OR supplies to accompany: Blood Glucose Monitor Brands: per insurance. 1 kit 0    blood glucose monitoring (ONETOUCH VERIO) meter device kit USE TO TEST BLOOD SUGAR 2 TIMES DAILY OR AS DIRECTED. 1 kit 0    blood glucose monitoring (SOFTCLIX) lancets 1 each by In Vitro route daily USE WITH LANCING DEVICE 1 TIME DAILY. TO ACCOMPANY: BLOOD GLUCOSE MONITOR PER INSURANCE. 100 each 6    calcium carb-cholecalciferol 600-500 MG-UNIT CAPS TAKE 1 CAPSULE BY MOUTH TWICE A  capsule 3    Calcium Carbonate-Vitamin D (CALCIUM-VITAMIN D) 600-125 MG-UNIT TABS TAKE 1 CAPSULE BY MOUTH TWICE A DAY      diclofenac (VOLTAREN) 1 % topical gel APPLY 2 GRAMS  TOPICALLY 4 TIMES DAILY 200 g 1    empagliflozin (JARDIANCE) 10 MG TABS tablet Take 1 tablet (10 mg) by mouth daily 90 tablet 1    lisinopril (ZESTRIL) 5 MG tablet Take 1 tablet (5 mg) by mouth daily 90 tablet 1    Multiple Vitamins-Minerals (CHOICEFUL MULTIVITAMIN PO)       naproxen (NAPROSYN) 500 MG tablet TAKE 1 TABLET BY MOUTH TWICE A DAY AS NEEDED 30 tablet 1    Omega-3 Fatty Acids (FISH OIL CONCENTRATE PO)       omeprazole (PRILOSEC OTC) 20 MG EC tablet Take 1 tablet (20 mg) by mouth daily 90 tablet 3    omeprazole 20 MG tablet TAKE 1 TABLET BY MOUTH EVERY DAY 90 tablet 0    ONETOUCH VERIO IQ test strip USE TO TEST BLOOD SUGARS 2 TIMES DAILY OR AS DIRECTED 100 strip 3    ORDER FOR DME Equipment being ordered: walker  Wheeled and seated if needed 1 each 0    ORDER FOR DME Equipment being ordered: TENS 1 Units 0    pravastatin (PRAVACHOL) 20 MG tablet Take 1 tablet (20 mg) by mouth daily 90 tablet 3     No Known Allergies  Recent Labs   Lab Test 06/28/24  1037 03/15/24  0847 09/07/23  0957 08/29/23  1227 08/22/23  0717 08/26/22  0800 08/26/22  0800 05/11/21  1110 02/11/20  0819   A1C 7.6* 8.6*  --   --  8.6*   < > 7.4* 6.9* 6.8*   LDL  --   --   --   --  100  --  95 109* 86   HDL  --   --   --   --  53  --  52 60 57   TRIG  --   --   --   --  93  --  115 84 117   ALT  --   --  46 70* 72*  --   --  40 58*   CR 0.67 0.72  --   --  0.61   < >  --  0.67 0.59   GFRESTIMATED >90 87  --   --  >90   < >  --  88 >90   GFRESTBLACK  --   --   --   --   --   --   --  >90 >90   POTASSIUM 4.6 4.5  --   --  4.4   < >  --  4.1 4.5   TSH  --   --   --   --  0.63  --  0.92  --  0.60    < > = values in this interval not displayed.          Review of Systems  Constitutional, neuro, ENT, endocrine, pulmonary, cardiac, gastrointestinal, genitourinary, musculoskeletal, integument and psychiatric systems are negative, except as otherwise noted.      Objective    /86 (BP Location: Right arm, Patient Position: Sitting, Cuff Size:  Adult Regular)   Pulse 80   Temp 97  F (36.1  C) (Tympanic)   Resp 15   Ht 1.524 m (5')   Wt 59.4 kg (130 lb 14.4 oz)   SpO2 97%   BMI 25.56 kg/m    Body mass index is 25.56 kg/m .  Physical Exam   GENERAL: Patient is well nourished, well developed,in no apparent distress, non-toxic, in no respiratory distress and acyanotic, alert, and cooperative  EYES: Eyes grossly normal to inspection and conjunctivae and sclerae normal  HENT:ear canals and TM's normal and nose and mouth without ulcers or lesions   NECK:normal and supple  CARDIAC:regular rates and rhythm, no murmur, click or rub, and no irregular beats  without LE edema bilaterally  RESP: normal respiratory rate and rhythm, lungs clear to auscultation  unlabored respirations, no intercostal retractions or accessory muscle use  ABD:soft, nontender  SKIN: Skin color, texture, turgor normal. No rashes or lesions.  MS: extremities normal- no gross deformities noted, gait normal, and normal muscle tone  normal DP and PT pulses, no trophic changes or ulcerative lesions, and normal sensory exam  NEURO: Normal strength and tone, sensory exam grossly normal, mentation intact, and speech normal  PSYCH: Alert, oriented, thought content appropriate,mentation appears normal., affect and mood normal        Results for orders placed or performed in visit on 06/28/24   Basic metabolic panel     Status: Abnormal   Result Value Ref Range    Sodium 140 135 - 145 mmol/L    Potassium 4.6 3.4 - 5.3 mmol/L    Chloride 105 98 - 107 mmol/L    Carbon Dioxide (CO2) 26 22 - 29 mmol/L    Anion Gap 9 7 - 15 mmol/L    Urea Nitrogen 13.5 8.0 - 23.0 mg/dL    Creatinine 0.67 0.51 - 0.95 mg/dL    GFR Estimate >90 >60 mL/min/1.73m2    Calcium 9.2 8.8 - 10.2 mg/dL    Glucose 162 (H) 70 - 99 mg/dL   Hemoglobin A1c     Status: Abnormal   Result Value Ref Range    Hemoglobin A1C 7.6 (H) 0.0 - 5.6 %     Assessment & Plan     Type 2 diabetes mellitus with hyperglycemia, without long-term current  use of insulin (H)  foot care discussed and Podiatry visits discussed, annual eye examinations at Ophthalmology discussed, glycohemoglobin monitoring discussed, and long term diabetic complications discussed  No changes in current regimen  Attempt to improve diet  Weight loss advised  Increased exercise advised  Medication changes as follows: stop metformin and start Jardiance    Recheck in 3 months, sooner should new symptoms or   problems arise.    - empagliflozin (JARDIANCE) 10 MG TABS tablet  Dispense: 90 tablet; Refill: 1  - **Basic metabolic panel FUTURE 2mo  - **Hemoglobin A1c FUTURE 3mo  - MO FOOT EXAM NO CHARGE    HTN, goal below 130/80  HTN Plan:  1)  Medication: continue current medication regimen unchanged  2)  Dietary sodium restriction  3)  Regular aerobic exercise  4)  Recheck in 6 months, sooner should new symptoms or   problems arise.  5) See todays orders.    Patient Education: Reviewed risks of hypertension and principles of   treatment.    - **Basic metabolic panel FUTURE 2mo    Hyperlipidemia LDL goal <100  The current medical regimen is effective.  Continue current medication regimen unchanged.      See Patient Instructions  Patient Instructions   PLAN:   1.   Symptomatic therapy suggested: stop metformin   Will start on Jardiance once a day.  use Robitussin DM as needed for cough     2.  Orders Placed This Encounter   Medications    empagliflozin (JARDIANCE) 10 MG TABS tablet     Sig: Take 1 tablet (10 mg) by mouth daily     Dispense:  90 tablet     Refill:  1     Orders Placed This Encounter   Procedures    **Basic metabolic panel FUTURE 2mo    **Hemoglobin A1c FUTURE 3mo       3.  FUTURE LABS:       - Schedule non-fasting labs in 3 months  Follow up in 6 months.  Will follow up and/or notify patient of  results via My Chart to determine further need for followup    Patient needs to follow up in if no improvement,or sooner if worsening of symptoms or other symptoms develop.               Signed Electronically by: CAMILO Benavides CNP

## 2024-06-28 NOTE — PATIENT INSTRUCTIONS
PLAN:   1.   Symptomatic therapy suggested: stop metformin   Will start on Jardiance once a day.  use Robitussin DM as needed for cough     2.  Orders Placed This Encounter   Medications    empagliflozin (JARDIANCE) 10 MG TABS tablet     Sig: Take 1 tablet (10 mg) by mouth daily     Dispense:  90 tablet     Refill:  1     Orders Placed This Encounter   Procedures    **Basic metabolic panel FUTURE 2mo    **Hemoglobin A1c FUTURE 3mo       3.  FUTURE LABS:       - Schedule non-fasting labs in 3 months  Follow up in 6 months.  Will follow up and/or notify patient of  results via My Chart to determine further need for followup    Patient needs to follow up in if no improvement,or sooner if worsening of symptoms or other symptoms develop.

## 2024-06-29 DIAGNOSIS — E11.65 TYPE 2 DIABETES MELLITUS WITH HYPERGLYCEMIA, WITHOUT LONG-TERM CURRENT USE OF INSULIN (H): Primary | ICD-10-CM

## 2024-06-30 NOTE — RESULT ENCOUNTER NOTE
Kishor Acosta,    Attached are your test results.  -Kidney function (GFR) is normal.  -Sodium is normal.  -Potassium is normal.  -Calcium is normal.  -Glucose is elevated due to your diabetes.  -A1C (test of diabetes control the last 2-3 months) is at your goal. Please continue with your current plan. Also, recheck your A1C test in 3 months.    Please contact us if you have any questions.    Cherie Covington, CNP

## 2024-09-05 ENCOUNTER — OFFICE VISIT (OUTPATIENT)
Dept: FAMILY MEDICINE | Facility: CLINIC | Age: 74
End: 2024-09-05
Attending: NURSE PRACTITIONER
Payer: MEDICARE

## 2024-09-05 VITALS
RESPIRATION RATE: 18 BRPM | SYSTOLIC BLOOD PRESSURE: 129 MMHG | WEIGHT: 132 LBS | TEMPERATURE: 96.7 F | OXYGEN SATURATION: 99 % | HEIGHT: 61 IN | BODY MASS INDEX: 24.92 KG/M2 | DIASTOLIC BLOOD PRESSURE: 83 MMHG | HEART RATE: 70 BPM

## 2024-09-05 DIAGNOSIS — M25.571 CHRONIC PAIN OF RIGHT ANKLE: ICD-10-CM

## 2024-09-05 DIAGNOSIS — I10 HTN, GOAL BELOW 130/80: ICD-10-CM

## 2024-09-05 DIAGNOSIS — E11.65 TYPE 2 DIABETES MELLITUS WITH HYPERGLYCEMIA, WITHOUT LONG-TERM CURRENT USE OF INSULIN (H): ICD-10-CM

## 2024-09-05 DIAGNOSIS — B18.1 VIRAL HEPATITIS B CHRONIC (H): ICD-10-CM

## 2024-09-05 DIAGNOSIS — E28.39 MENOPAUSE OVARIAN FAILURE: ICD-10-CM

## 2024-09-05 DIAGNOSIS — M25.571 RIGHT ANKLE PAIN, UNSPECIFIED CHRONICITY: ICD-10-CM

## 2024-09-05 DIAGNOSIS — Z13.0 SCREENING FOR DISORDER OF BLOOD AND BLOOD-FORMING ORGANS: ICD-10-CM

## 2024-09-05 DIAGNOSIS — Z13.29 SCREENING FOR THYROID DISORDER: ICD-10-CM

## 2024-09-05 DIAGNOSIS — E78.5 HYPERLIPIDEMIA LDL GOAL <100: ICD-10-CM

## 2024-09-05 DIAGNOSIS — Z00.00 ENCOUNTER FOR MEDICARE ANNUAL WELLNESS EXAM: Primary | ICD-10-CM

## 2024-09-05 DIAGNOSIS — G89.29 CHRONIC PAIN OF RIGHT ANKLE: ICD-10-CM

## 2024-09-05 DIAGNOSIS — K21.9 GASTROESOPHAGEAL REFLUX DISEASE WITHOUT ESOPHAGITIS: ICD-10-CM

## 2024-09-05 DIAGNOSIS — Z12.31 ENCOUNTER FOR SCREENING MAMMOGRAM FOR BREAST CANCER: ICD-10-CM

## 2024-09-05 LAB
ALBUMIN SERPL BCG-MCNC: 4.2 G/DL (ref 3.5–5.2)
ALP SERPL-CCNC: 103 U/L (ref 40–150)
ALT SERPL W P-5'-P-CCNC: 50 U/L (ref 0–50)
ANION GAP SERPL CALCULATED.3IONS-SCNC: 9 MMOL/L (ref 7–15)
AST SERPL W P-5'-P-CCNC: 51 U/L (ref 0–45)
BILIRUB SERPL-MCNC: 0.6 MG/DL
BUN SERPL-MCNC: 17.1 MG/DL (ref 8–23)
CALCIUM SERPL-MCNC: 9.2 MG/DL (ref 8.8–10.4)
CHLORIDE SERPL-SCNC: 105 MMOL/L (ref 98–107)
CHOLEST SERPL-MCNC: 173 MG/DL
CREAT SERPL-MCNC: 0.73 MG/DL (ref 0.51–0.95)
CREAT UR-MCNC: 51.9 MG/DL
EGFRCR SERPLBLD CKD-EPI 2021: 85 ML/MIN/1.73M2
ERYTHROCYTE [DISTWIDTH] IN BLOOD BY AUTOMATED COUNT: 14.2 % (ref 10–15)
FASTING STATUS PATIENT QL REPORTED: YES
FASTING STATUS PATIENT QL REPORTED: YES
GLUCOSE SERPL-MCNC: 140 MG/DL (ref 70–99)
HBA1C MFR BLD: 7.5 % (ref 0–5.6)
HCO3 SERPL-SCNC: 25 MMOL/L (ref 22–29)
HCT VFR BLD AUTO: 43.7 % (ref 35–47)
HDLC SERPL-MCNC: 56 MG/DL
HGB BLD-MCNC: 14.4 G/DL (ref 11.7–15.7)
LDLC SERPL CALC-MCNC: 103 MG/DL
MCH RBC QN AUTO: 26.7 PG (ref 26.5–33)
MCHC RBC AUTO-ENTMCNC: 33 G/DL (ref 31.5–36.5)
MCV RBC AUTO: 81 FL (ref 78–100)
MICROALBUMIN UR-MCNC: <12 MG/L
MICROALBUMIN/CREAT UR: NORMAL MG/G{CREAT}
NONHDLC SERPL-MCNC: 117 MG/DL
PLATELET # BLD AUTO: 226 10E3/UL (ref 150–450)
POTASSIUM SERPL-SCNC: 5.2 MMOL/L (ref 3.4–5.3)
PROT SERPL-MCNC: 7.9 G/DL (ref 6.4–8.3)
RBC # BLD AUTO: 5.39 10E6/UL (ref 3.8–5.2)
SODIUM SERPL-SCNC: 139 MMOL/L (ref 135–145)
TRIGL SERPL-MCNC: 71 MG/DL
TSH SERPL DL<=0.005 MIU/L-ACNC: 0.66 UIU/ML (ref 0.3–4.2)
WBC # BLD AUTO: 7.9 10E3/UL (ref 4–11)

## 2024-09-05 PROCEDURE — 80061 LIPID PANEL: CPT | Performed by: NURSE PRACTITIONER

## 2024-09-05 PROCEDURE — 82570 ASSAY OF URINE CREATININE: CPT | Performed by: NURSE PRACTITIONER

## 2024-09-05 PROCEDURE — 99214 OFFICE O/P EST MOD 30 MIN: CPT | Mod: 25 | Performed by: NURSE PRACTITIONER

## 2024-09-05 PROCEDURE — 87517 HEPATITIS B DNA QUANT: CPT | Performed by: NURSE PRACTITIONER

## 2024-09-05 PROCEDURE — 84443 ASSAY THYROID STIM HORMONE: CPT | Performed by: NURSE PRACTITIONER

## 2024-09-05 PROCEDURE — 85027 COMPLETE CBC AUTOMATED: CPT | Performed by: NURSE PRACTITIONER

## 2024-09-05 PROCEDURE — 80053 COMPREHEN METABOLIC PANEL: CPT | Performed by: NURSE PRACTITIONER

## 2024-09-05 PROCEDURE — 82043 UR ALBUMIN QUANTITATIVE: CPT | Performed by: NURSE PRACTITIONER

## 2024-09-05 PROCEDURE — G0439 PPPS, SUBSEQ VISIT: HCPCS | Performed by: NURSE PRACTITIONER

## 2024-09-05 PROCEDURE — 83036 HEMOGLOBIN GLYCOSYLATED A1C: CPT | Performed by: NURSE PRACTITIONER

## 2024-09-05 PROCEDURE — 36415 COLL VENOUS BLD VENIPUNCTURE: CPT | Performed by: NURSE PRACTITIONER

## 2024-09-05 RX ORDER — NICOTINE POLACRILEX 4 MG/1
20 GUM, CHEWING ORAL DAILY
Qty: 90 TABLET | Refills: 0 | Status: SHIPPED | OUTPATIENT
Start: 2024-09-05

## 2024-09-05 RX ORDER — PRAVASTATIN SODIUM 20 MG
20 TABLET ORAL DAILY
Qty: 90 TABLET | Refills: 3 | Status: SHIPPED | OUTPATIENT
Start: 2024-09-05

## 2024-09-05 RX ORDER — NAPROXEN 500 MG/1
500 TABLET ORAL 2 TIMES DAILY PRN
Qty: 30 TABLET | Refills: 1 | Status: SHIPPED | OUTPATIENT
Start: 2024-09-05

## 2024-09-05 RX ORDER — LISINOPRIL 5 MG/1
5 TABLET ORAL DAILY
Qty: 90 TABLET | Refills: 1 | Status: SHIPPED | OUTPATIENT
Start: 2024-09-05

## 2024-09-05 ASSESSMENT — PAIN SCALES - GENERAL: PAINLEVEL: SEVERE PAIN (7)

## 2024-09-05 NOTE — PROGRESS NOTES
Preventive Care Visit  [facility]  Cherie CAMILO Dorsey CNP, Family Medicine  Rissa Acosta is accompanied today by friends and      Sep 5, 2024      Assessment & Plan     Encounter for Medicare annual wellness exam  - PRIMARY CARE FOLLOW-UP SCHEDULING    Type 2 diabetes mellitus with hyperglycemia, without long-term current use of insulin (H)  foot care discussed and Podiatry visits discussed, annual eye examinations at Ophthalmology discussed, glycohemoglobin monitoring discussed, and long term diabetic complications discussed  Attempt to improve diet   Recheck in 6 months, sooner should new symptoms or   problems arise.   - Albumin Random Urine Quantitative with Creat Ratio  - Hemoglobin A1c  - empagliflozin (JARDIANCE) 10 MG TABS tablet  Dispense: 90 tablet; Refill: 1  - Albumin Random Urine Quantitative with Creat Ratio  - Hemoglobin A1c    Hyperlipidemia LDL goal <100  The current medical regimen is effective.  Continue current medication regimen unchanged.  - pravastatin (PRAVACHOL) 20 MG tablet  Dispense: 90 tablet; Refill: 3  - Lipid panel reflex to direct LDL Non-fasting  - Comprehensive metabolic panel    HTN, goal below 130/80  HTN Plan:  1)  Medication: continue current medication regimen unchanged  2)  Dietary sodium restriction  3)  Regular aerobic exercise  4)  Recheck in 1 year, sooner should new symptoms or   problems arise.  5) See todays orders.    Patient Education: Reviewed risks of hypertension and principles of   treatment.  - Comprehensive metabolic panel  - lisinopril (ZESTRIL) 5 MG tablet  Dispense: 90 tablet; Refill: 1  - Comprehensive metabolic panel    Viral hepatitis B chronic (H)  - Hep B Virus DNA Quant Real Time PCR    Screening for thyroid disorder  - TSH with free T4 reflex    Screening for disorder of blood and blood-forming organs  - CBC with platelets    Menopause ovarian failure  - DX Bone Density    Encounter for screening mammogram  "for breast cancer  - MA Screen Bilateral w/Shun    Chronic pain of right ankle  Medication refills given.   - diclofenac (VOLTAREN) 1 % topical gel  Dispense: 200 g; Refill: 1    Right ankle pain, unspecified chronicity  FOLLOW UP WITH SPECIALIST :Orthopedics  - naproxen (NAPROSYN) 500 MG tablet  Dispense: 30 tablet; Refill: 1    Gastroesophageal reflux disease without esophagitis  The current medical regimen is effective.  Continue current medication regimen unchanged.  - omeprazole 20 MG tablet  Dispense: 90 tablet; Refill: 0      Patient has been advised of split billing requirements and indicates understanding: Yes    PLAN:       BMI  Estimated body mass index is 25.36 kg/m  as calculated from the following:    Height as of this encounter: 1.537 m (5' 0.5\").    Weight as of this encounter: 59.9 kg (132 lb).       Counseling  Appropriate preventive services were addressed with this patient via screening, questionnaire, or discussion as appropriate for fall prevention, nutrition, physical activity, Tobacco-use cessation, social engagement, weight loss and cognition.  Checklist reviewing preventive services available has been given to the patient.  Reviewed patient's diet, addressing concerns and/or questions.   She is at risk for lack of exercise and has been provided with information to increase physical activity for the benefit of her well-being.   The patient was instructed to see the dentist every 6 months.   She is at risk for psychosocial distress and has been provided with information to reduce risk.   Discussed possible causes of fatigue.     See Patient Instructions    Subjective   Rissa is a 74 year old, presenting for the following:  Annual Visit        6/28/2024    10:29 AM   Additional Questions   Roomed by Evens   Accompanied by Mjogan - Daughter in law         Health Care Directive  Patient does not have a Health Care Directive or Living Will: Discussed advance care planning with patient; however, " patient declined at this time.    HPI      Diabetes Follow-up    How often are you checking your blood sugar? One time daily  What time of day are you checking your blood sugars (select all that apply)?  Before meals  Have you had any blood sugars above 200?  No  Have you had any blood sugars below 70?  No  What symptoms do you notice when your blood sugar is low?  None  What concerns do you have today about your diabetes? None   Do you have any of these symptoms? (Select all that apply)  No numbness or tingling in feet.  No redness, sores or blisters on feet.  No complaints of excessive thirst.  No reports of blurry vision.  No significant changes to weight.  Have you had a diabetic eye exam in the last 12 months? No  Almost 2 years and will make appointment           Hyperlipidemia Follow-Up    Are you regularly taking any medication or supplement to lower your cholesterol?   Yes-    Are you having muscle aches or other side effects that you think could be caused by your cholesterol lowering medication?  No    Hypertension Follow-up    Do you check your blood pressure regularly outside of the clinic? No   Are you following a low salt diet? Yes  Are your blood pressures ever more than 140 on the top number (systolic) OR more   than 90 on the bottom number (diastolic), for example 140/90? No    BP Readings from Last 2 Encounters:   09/05/24 129/83   06/28/24 130/86     Hemoglobin A1C (%)   Date Value   09/05/2024 7.5 (H)   06/28/2024 7.6 (H)   05/11/2021 6.9 (H)   02/11/2020 6.8 (H)     LDL Cholesterol Calculated (mg/dL)   Date Value   09/05/2024 103 (H)   08/22/2023 100   05/11/2021 109 (H)   02/11/2020 86           9/4/2024   General Health   How would you rate your overall physical health? (!) FAIR   Feel stress (tense, anxious, or unable to sleep) Only a little      (!) STRESS CONCERN      9/4/2024   Nutrition   Diet: Regular (no restrictions)            9/4/2024   Exercise   Days per week of moderate/strenous  exercise 1 day   Average minutes spent exercising at this level 10 min      (!) EXERCISE CONCERN      9/4/2024   Social Factors   Frequency of gathering with friends or relatives Once a week   Worry food won't last until get money to buy more No   Food not last or not have enough money for food? No   Do you have housing? (Housing is defined as stable permanent housing and does not include staying ouside in a car, in a tent, in an abandoned building, in an overnight shelter, or couch-surfing.) Yes   Are you worried about losing your housing? No   Lack of transportation? No   Unable to get utilities (heat,electricity)? No            9/5/2024   Fall Risk   Gait Speed Test (Document in seconds) 7.08   Gait Speed Test Interpretation Greater than 5.01 seconds - ABNORMAL             9/4/2024   Activities of Daily Living- Home Safety   Needs help with the following daily activites None of the above   Safety concerns in the home None of the above            9/4/2024   Dental   Dentist two times every year? (!) NO            9/4/2024   Hearing Screening   Hearing concerns? None of the above            9/4/2024   Driving Risk Screening   Patient/family members have concerns about driving (!) DECLINE            9/4/2024   General Alertness/Fatigue Screening   Have you been more tired than usual lately? (!) YES            9/4/2024   Urinary Incontinence Screening   Bothered by leaking urine in past 6 months No               Today's PHQ-2 Score:       9/4/2024    12:07 PM   PHQ-2 ( 1999 Pfizer)   PHQ-2 Score Incomplete           9/4/2024   Substance Use   Alcohol more than 3/day or more than 7/wk No   Do you have a current opioid prescription? No   How severe/bad is pain from 1 to 10? 6/10   Do you use any other substances recreationally? No        Social History     Tobacco Use    Smoking status: Never     Passive exposure: Never    Smokeless tobacco: Never   Vaping Use    Vaping status: Never Used   Substance Use Topics     Alcohol use: No    Drug use: No           10/2/2023   LAST FHS-7 RESULTS   1st degree relative breast or ovarian cancer No   Any relative bilateral breast cancer No   Any male have breast cancer No   Any ONE woman have BOTH breast AND ovarian cancer No   Any woman with breast cancer before 50yrs No   2 or more relatives with breast AND/OR ovarian cancer No   2 or more relatives with breast AND/OR bowel cancer No           Mammogram Screening - After age 74- determine frequency with patient based on health status, life expectancy and patient goals    ASCVD Risk   The 10-year ASCVD risk score (Dori EDWARDS, et al., 2019) is: 36.8%    Values used to calculate the score:      Age: 74 years      Sex: Female      Is Non- : No      Diabetic: Yes      Tobacco smoker: No      Systolic Blood Pressure: 129 mmHg      Is BP treated: Yes      HDL Cholesterol: 56 mg/dL      Total Cholesterol: 173 mg/dL          Reviewed and updated as needed this visit by Provider                    Past Medical History:   Diagnosis Date    Closed fracture of lumbar vertebra (H) 2/25/2011    DDD (degenerative disc disease), cervical 9/27/2013    DDD (degenerative disc disease), lumbar 9/27/2013    Diabetes (H)     Hepatitis     was treated in the past but not sure what type    Spinal stenosis of lumbar region 6/24/2011    Vitamin D insufficiency 9/27/2013     Past Surgical History:   Procedure Laterality Date    BACK SURGERY  2011    [facility]    CATARACT IOL, RT/LT Right 2005    COLONOSCOPY N/A 3/3/2020    Procedure: Colonoscopy, With Polypectomy And Biopsy;  Surgeon: Arlet Zaragoza DO;  Location: [facility] OR    COLONOSCOPY WITH CO2 INSUFFLATION N/A 3/3/2020    Procedure: COLONOSCOPY, WITH CO2 INSUFFLATION;  Surgeon: Arlet Zaragoza DO;  Location: [facility] OR     Lab work is in process  Labs reviewed in EPIC  BP Readings from Last 3 Encounters:   09/05/24 129/83   06/28/24 130/86   03/28/24 (!) 149/79    Wt  Readings from Last 3 Encounters:   09/05/24 59.9 kg (132 lb)   06/28/24 59.4 kg (130 lb 14.4 oz)   03/28/24 61.2 kg (135 lb)                  Patient Active Problem List   Diagnosis    CARDIOVASCULAR SCREENING; LDL GOAL LESS THAN 160    DDD (degenerative disc disease), cervical    DDD (degenerative disc disease), lumbar    Vitamin D insufficiency    Elevated LFTs    Positive PPD    Bilateral low back pain without sciatica    Lumbar radiculopathy    Closed fracture of lumbar vertebra (H)    Spinal stenosis of lumbar region    Viral hepatitis    Type 2 diabetes mellitus with hyperglycemia, without long-term current use of insulin (H)    Osteopenia    Neuropathic pain, leg    Exposure to hepatitis B    Spinal stenosis of lumbar region without neurogenic claudication    Osteoporosis    Hyperlipidemia LDL goal <100    Viral hepatitis B chronic (H)    HTN, goal below 130/80     Past Surgical History:   Procedure Laterality Date    BACK SURGERY  2011    [facility]    CATARACT IOL, RT/LT Right 2005    COLONOSCOPY N/A 3/3/2020    Procedure: Colonoscopy, With Polypectomy And Biopsy;  Surgeon: Arlet Zaragoza DO;  Location: [facility] OR    COLONOSCOPY WITH CO2 INSUFFLATION N/A 3/3/2020    Procedure: COLONOSCOPY, WITH CO2 INSUFFLATION;  Surgeon: Arlet Zaragoza DO;  Location: [facility] OR       Social History     Tobacco Use    Smoking status: Never     Passive exposure: Never    Smokeless tobacco: Never   Substance Use Topics    Alcohol use: No     Family History   Problem Relation Age of Onset    Diabetes Sister     Diabetes Son     Coronary Artery Disease Brother     Hypertension Brother     Hyperlipidemia Sister     Macular Degeneration No family hx of     Glaucoma No family hx of     Strabismus No family hx of          Current Outpatient Medications   Medication Sig Dispense Refill    ACCU-CHEK GUIDE test strip USE TO TEST BLOOD SUGAR 1 TIMES DAILY OR AS DIRECTED. 50 strip 1    ascorbic acid (VITAMIN C) 500 MG tablet  Take by mouth daily      B Complex Vitamins (VITAMIN-B COMPLEX PO)       blood glucose (BETSY CONTOUR NEXT) test strip Use to test blood sugar 2 times daily or as directed. 100 strip 3    blood glucose (NO BRAND SPECIFIED) test strip Use to test blood sugar  1 to 2  times daily or as directed. To accompany: Blood Glucose Monitor Brands: per insurance. 100 strip 11    blood glucose (NO BRAND SPECIFIED) test strip Use to test blood sugar 1 times daily or as directed. To accompany: Blood Glucose Monitor Brands: per insurance. 100 strip 6    blood glucose calibration (NO BRAND SPECIFIED) solution To accompany: Blood Glucose Monitor Brands: per insurance. 1 Bottle 3    blood glucose monitoring (ACCU-CHEK FASTCLIX) lancets 1 each by In Vitro route daily Use to test blood sugar 1 times daily or as directed. 100 each 11    blood glucose monitoring (BETSY MICROLET) lancets USE TO TEST 1 TO 2 TIMES DAILY, AND AS NEEDED 200 each 3    blood glucose monitoring (NO BRAND SPECIFIED) meter device kit Use to test blood sugar 1 times daily or as directed. Preferred blood glucose meter OR supplies to accompany: Blood Glucose Monitor Brands: per insurance. 1 kit 0    blood glucose monitoring (ONETOUCH VERIO) meter device kit USE TO TEST BLOOD SUGAR 2 TIMES DAILY OR AS DIRECTED. 1 kit 0    blood glucose monitoring (SOFTCLIX) lancets 1 each by In Vitro route daily USE WITH LANCING DEVICE 1 TIME DAILY. TO ACCOMPANY: BLOOD GLUCOSE MONITOR PER INSURANCE. 100 each 6    calcium carb-cholecalciferol 600-500 MG-UNIT CAPS TAKE 1 CAPSULE BY MOUTH TWICE A  capsule 3    Calcium Carbonate-Vitamin D (CALCIUM-VITAMIN D) 600-125 MG-UNIT TABS TAKE 1 CAPSULE BY MOUTH TWICE A DAY      diclofenac (VOLTAREN) 1 % topical gel Apply 2 g topically 4 times daily as needed for moderate pain. 200 g 1    empagliflozin (JARDIANCE) 10 MG TABS tablet Take 1 tablet (10 mg) by mouth daily. 90 tablet 1    lisinopril (ZESTRIL) 5 MG tablet Take 1 tablet (5 mg) by  mouth daily. 90 tablet 1    Multiple Vitamins-Minerals (CHOICEFUL MULTIVITAMIN PO)       naproxen (NAPROSYN) 500 MG tablet Take 1 tablet (500 mg) by mouth 2 times daily as needed. 30 tablet 1    Omega-3 Fatty Acids (FISH OIL CONCENTRATE PO)       omeprazole 20 MG tablet Take 1 tablet (20 mg) by mouth daily. 90 tablet 0    ONETOUCH VERIO IQ test strip USE TO TEST BLOOD SUGARS 2 TIMES DAILY OR AS DIRECTED 100 strip 3    ORDER FOR DME Equipment being ordered: walker  Wheeled and seated if needed 1 each 0    ORDER FOR DME Equipment being ordered: TENS 1 Units 0    pravastatin (PRAVACHOL) 20 MG tablet Take 1 tablet (20 mg) by mouth daily. 90 tablet 3     No Known Allergies  Recent Labs   Lab Test 06/28/24  1037 03/15/24  0847 09/07/23  0957 08/29/23  1227 08/22/23  0717 08/26/22  0800 08/26/22  0800 05/11/21  1110 02/11/20  0819   A1C 7.6* 8.6*  --   --  8.6*   < > 7.4* 6.9* 6.8*   LDL  --   --   --   --  100  --  95 109* 86   HDL  --   --   --   --  53  --  52 60 57   TRIG  --   --   --   --  93  --  115 84 117   ALT  --   --  46 70* 72*  --   --  40 58*   CR 0.67 0.72  --   --  0.61   < >  --  0.67 0.59   GFRESTIMATED >90 87  --   --  >90   < >  --  88 >90   GFRESTBLACK  --   --   --   --   --   --   --  >90 >90   POTASSIUM 4.6 4.5  --   --  4.4   < >  --  4.1 4.5   TSH  --   --   --   --  0.63  --  0.92  --  0.60    < > = values in this interval not displayed.      Current providers sharing in care for this patient include:  Patient Care Team:  Cherie Covington APRN CNP as PCP - General (Family Practice)  Kalia Reddy MD as MD (Ophthalmology)  Mojgan Dave MD (Ophthalmology)  Millie Fuentes Chi, OD as MD (Optometry)  Cherie Covington APRN CNP as Assigned PCP  Marifer Barboza RN as Lead Care Coordinator (Primary Care - CC)  Jacey Ortiz MD as Assigned Musculoskeletal Provider    The following health maintenance items are reviewed in Epic and correct as of today:  Health Maintenance   Topic Date Due     EYE EXAM  05/16/2023    INFLUENZA VACCINE (1) 09/01/2024    COVID-19 Vaccine (7 - 2023-24 season) 09/01/2024    HEPATITIS A IMMUNIZATION (1 of 2 - Risk 2-dose series) 07/01/2025 (Originally 2/10/1969)    A1C  03/05/2025    DIABETIC FOOT EXAM  07/06/2025    MEDICARE ANNUAL WELLNESS VISIT  09/05/2025    BMP  09/05/2025    LIPID  09/05/2025    MICROALBUMIN  09/05/2025    ANNUAL REVIEW OF HM ORDERS  09/05/2025    FALL RISK ASSESSMENT  09/05/2025    DTAP/TDAP/TD IMMUNIZATION (3 - Td or Tdap) 12/10/2025    ADVANCE CARE PLANNING  09/05/2029    COLORECTAL CANCER SCREENING  03/03/2030    DEXA  02/18/2035    HEPATITIS C SCREENING  Completed    PHQ-2 (once per calendar year)  Completed    Pneumococcal Vaccine: 65+ Years  Completed    ZOSTER IMMUNIZATION  Completed    RSV VACCINE  Completed    HPV IMMUNIZATION  Aged Out    MENINGITIS IMMUNIZATION  Aged Out    RSV MONOCLONAL ANTIBODY  Aged Out    MAMMO SCREENING  Discontinued    HEPATITIS B IMMUNIZATION  Discontinued       CONSTITUTIONAL:NEGATIVE for fever, chills, change in weight  INTEGUMENTARY/SKIN: NEGATIVE for worrisome rashes, moles or lesions  EYES: NEGATIVE for vision changes or irritation  ENT: NEGATIVE for ear, mouth and throat problems  RESP:NEGATIVE for significant cough or SOB  BREAST: NEGATIVE for masses, tenderness or discharge  CV: NEGATIVE for chest pain, palpitations or peripheral edema  GI: NEGATIVE for nausea, abdominal pain, heartburn, or change in bowel habits   menopausal female: amenorrhea, no unusual urinary symptoms, and no unusual vaginal symptoms  MUSCULOSKELETAL:POSITIVE  for arthralgias Right ankle  and NEGATIVE for joint swelling  and joint warmth   NEURO: NEGATIVE for weakness, dizziness or paresthesias  ENDOCRINE: NEGATIVE for temperature intolerance, skin/hair changes and POSITIVE  for HX diabetes  HEME/ALLERGY/IMMUNE: NEGATIVE for bleeding problems  PSYCHIATRIC: NEGATIVE for changes in mood or affect            Objective    Exam  /83  "(BP Location: Right arm, Patient Position: Sitting, Cuff Size: Adult Regular)   Pulse 70   Temp (!) 96.7  F (35.9  C) (Temporal)   Resp 18   Ht 1.537 m (5' 0.5\")   Wt 59.9 kg (132 lb)   SpO2 99%   BMI 25.36 kg/m     Estimated body mass index is 25.36 kg/m  as calculated from the following:    Height as of this encounter: 1.537 m (5' 0.5\").    Weight as of this encounter: 59.9 kg (132 lb).    Physical Exam  GENERAL: alert and no distress  EYES: Eyes grossly normal to inspection and conjunctivae and sclerae normal  HENT: ear canals and TM's normal, nose and mouth without ulcers or lesions  NECK: no adenopathy, no asymmetry, masses, or scars  RESP: lungs clear to auscultation - no rales, rhonchi or wheezes  BREAST: normal without masses, tenderness or nipple discharge and no palpable axillary masses or adenopathy  CV: regular rate and rhythm, normal S1 S2, no S3 or S4, no murmur, click or rub, no peripheral edema  ABDOMEN: soft, nontender, no hepatosplenomegaly, no masses and bowel sounds normal  MS: extremities normal- no gross deformities noted and normal muscle tone  no musculoskeletal defects are noted  tenderness of right ankle   Ankle Exam:   Knee:normal appearance  Lower leg:normal appearance    ANKLE  Inspection:Swelling:lateral    Tender:none   Non-tender:lateral malleolus, medial malleolus, achilles tendon  Range of Motion:dorsiflexion:  decreased, painful, plantarflexion:  decreased, painful  Strength:normal   SKIN: no suspicious lesions or rashes  NEURO: Normal strength and tone, mentation intact and speech normal  PSYCH: mentation appears normal, affect normal/bright  Diabetic foot exam: normal DP and PT pulses, no trophic changes or ulcerative lesions, and normal sensory exam         9/5/2024   Mini Cog   Clock Draw Score 2 Normal    0 Abnormal   3 Item Recall 2 objects recalled    2 objects recalled   Mini Cog Total Score 4    2       Multiple values from one day are sorted in " reverse-chronological order              Results for orders placed or performed in visit on 09/05/24   Lipid panel reflex to direct LDL Non-fasting     Status: Abnormal   Result Value Ref Range    Cholesterol 173 <200 mg/dL    Triglycerides 71 <150 mg/dL    Direct Measure HDL 56 >=50 mg/dL    LDL Cholesterol Calculated 103 (H) <=100 mg/dL    Non HDL Cholesterol 117 <130 mg/dL    Patient Fasting > 8hrs? Yes     Narrative    Cholesterol  Desirable:  <200 mg/dL    Triglycerides  Normal:  Less than 150 mg/dL  Borderline High:  150-199 mg/dL  High:  200-499 mg/dL  Very High:  Greater than or equal to 500 mg/dL    Direct Measure HDL  Female:  Greater than or equal to 50 mg/dL   Male:  Greater than or equal to 40 mg/dL    LDL Cholesterol  Desirable:  <100mg/dL  Above Desirable:  100-129 mg/dL   Borderline High:  130-159 mg/dL   High:  160-189 mg/dL   Very High:  >= 190 mg/dL    Non HDL Cholesterol  Desirable:  130 mg/dL  Above Desirable:  130-159 mg/dL  Borderline High:  160-189 mg/dL  High:  190-219 mg/dL  Very High:  Greater than or equal to 220 mg/dL   Albumin Random Urine Quantitative with Creat Ratio     Status: None   Result Value Ref Range    Creatinine Urine mg/dL 51.9 mg/dL    Albumin Urine mg/L <12.0 mg/L    Albumin Urine mg/g Cr     Comprehensive metabolic panel     Status: Abnormal   Result Value Ref Range    Sodium 139 135 - 145 mmol/L    Potassium 5.2 3.4 - 5.3 mmol/L    Carbon Dioxide (CO2) 25 22 - 29 mmol/L    Anion Gap 9 7 - 15 mmol/L    Urea Nitrogen 17.1 8.0 - 23.0 mg/dL    Creatinine 0.73 0.51 - 0.95 mg/dL    GFR Estimate 85 >60 mL/min/1.73m2    Calcium 9.2 8.8 - 10.4 mg/dL    Chloride 105 98 - 107 mmol/L    Glucose 140 (H) 70 - 99 mg/dL    Alkaline Phosphatase 103 40 - 150 U/L    AST 51 (H) 0 - 45 U/L    ALT 50 0 - 50 U/L    Protein Total 7.9 6.4 - 8.3 g/dL    Albumin 4.2 3.5 - 5.2 g/dL    Bilirubin Total 0.6 <=1.2 mg/dL    Patient Fasting > 8hrs? Yes    Hemoglobin A1c     Status: Abnormal   Result  Value Ref Range    Hemoglobin A1C 7.5 (H) 0.0 - 5.6 %   CBC with platelets     Status: Abnormal   Result Value Ref Range    WBC Count 7.9 4.0 - 11.0 10e3/uL    RBC Count 5.39 (H) 3.80 - 5.20 10e6/uL    Hemoglobin 14.4 11.7 - 15.7 g/dL    Hematocrit 43.7 35.0 - 47.0 %    MCV 81 78 - 100 fL    MCH 26.7 26.5 - 33.0 pg    MCHC 33.0 31.5 - 36.5 g/dL    RDW 14.2 10.0 - 15.0 %    Platelet Count 226 150 - 450 10e3/uL   Hep B Virus DNA Quant Real Time PCR     Status: Normal   Result Value Ref Range    Hepatitis B DNA IU/mL Not Detected Not Detected IU/mL    Narrative    The neris  HBV assay is an FDA-approved in vitro nucleic acid amplification test for the quantification of hepatitis B virus (HBV) DNA in human serum or plasma, using the Roche neris  6800 instrument for automated viral nucleic acid extraction (silica-based capture technique), purification, amplification, and detection of the viral nucleic acid target. This assay targets the highly conserved pre-Core/Core region of the HBV genome and generates amplification products that are detected real-time by a sequence specific TaqMan probe during amplification. The test is intended for use as an aid in the management of HBV infected patients undergoing anti-viral therapy. Titer results are reported in International Units/mL (IU/mL).    TSH with free T4 reflex     Status: Normal   Result Value Ref Range    TSH 0.66 0.30 - 4.20 uIU/mL       Signed Electronically by: CAMILO Benavides CNP

## 2024-09-05 NOTE — PATIENT INSTRUCTIONS
PLAN:   1.   Symptomatic therapy suggested: Continue current medications as prescribed.   .Increase calcium to 1000mg and 1000iu Vit D     2.  Orders Placed This Encounter   Medications    diclofenac (VOLTAREN) 1 % topical gel     Sig: Apply 2 g topically 4 times daily as needed for moderate pain.     Dispense:  200 g     Refill:  1    empagliflozin (JARDIANCE) 10 MG TABS tablet     Sig: Take 1 tablet (10 mg) by mouth daily.     Dispense:  90 tablet     Refill:  1    lisinopril (ZESTRIL) 5 MG tablet     Sig: Take 1 tablet (5 mg) by mouth daily.     Dispense:  90 tablet     Refill:  1    naproxen (NAPROSYN) 500 MG tablet     Sig: Take 1 tablet (500 mg) by mouth 2 times daily as needed.     Dispense:  30 tablet     Refill:  1    omeprazole 20 MG tablet     Sig: Take 1 tablet (20 mg) by mouth daily.     Dispense:  90 tablet     Refill:  0    pravastatin (PRAVACHOL) 20 MG tablet     Sig: Take 1 tablet (20 mg) by mouth daily.     Dispense:  90 tablet     Refill:  3    blood glucose (NO BRAND SPECIFIED) test strip     Sig: Use to test blood sugar  1 to 2  times daily or as directed. To accompany: Blood Glucose Monitor Brands: per insurance.     Dispense:  100 strip     Refill:  11     Orders Placed This Encounter   Procedures    REVIEW OF HEALTH MAINTENANCE PROTOCOL ORDERS    MA Screen Bilateral w/Shun    DX Bone Density    Lipid panel reflex to direct LDL Non-fasting    Albumin Random Urine Quantitative with Creat Ratio    Comprehensive metabolic panel    Hemoglobin A1c    CBC with platelets    Hep B Virus DNA Quant Real Time PCR    TSH with free T4 reflex       3.  FURTHER TESTING:       - DXA (bone density) scan       - mammogram  I will place order. Please call [phone] to schedule.  Follow up in 6 months.  Will follow up and/or notify patient of  results via My Chart to determine further need for followup   Make follow up appointment with orthopedics for ankle pain   [facility]  [address] [place] [address]  "Se  5th Floor  [facility] [address]  Dept: [phone]  Follow up office visit in one year for annual health maintenance exam, sooner PRN.   Patient needs to follow up in if no improvement,or sooner if worsening of symptoms or other symptoms develop.          Patient Education   L?i Khuyên Ch?m Sóc D? Phòng  ?ây là l?i hollie call tôi th??ng ??a ra ?? giúp m?i ng??i kh?e m?nh. Nhóm ch?m sóc c?a quý v? có th? có l?i khuyên c? th? dành riêng cho quý v?. Vui lòng trao ??i v?i nhóm ch?m sóc v? mady c?u ch?m sóc d? phòng c?a chính quý v?.  L?i s?ng  T?p th? d?c ít nh?t 150 phút m?i tu?n (30 phút m?i ngày, 5 ngày m?t tu?n).  Th?c hi?n các ho?t ??ng t?ng c??ng c? 2 ngày m?t tu?n. ?i?u này s? giúp quý v? ki?m soát cân n?ng và ng?n ng?a b?nh t?t.  Không hút thu?c.  Thoa roseann ch?ng n?ng ?? ng?n ng?a xin th? da.  Ki?m tra m?c ?? radon prasanth nhà t? 2 ??n 5 n?m m?t l?n. Radon là m?t lo?i khí không màu, không mùi có th? gây h?i cho ph?i c?a quý v?. ?? tìm hi?u eliezer fonseca c?p [email or web address] và tìm ki?m \"Radon in Homes\" (Radon prasanth nhà).  Gi? súng không n?p ??n và khóa l?i ?? ? n?i an toàn nh? két s?t ho?c h?m ch?a súng, ho?c s? d?ng khóa súng và c?t chìa khóa ?i. Luôn khóa và c?t ??n ? ch? riêng. ?? tìm hi?u thêm, hãy truy c?p [email or web address].[email or web address] và tìm ki?m \"safe gun storage\" (c?t gi? súng an toàn).  Bayron d??ng  ?n ít nh?t 5 kh?u ph?n trái cây và humberto qu? m?i ngày.  Hãy th? bánh mì lúa mì, g?o l?t và mì ?ng nguyên h?t (thay vì bánh mì tr?ng, g?o và mì ?ng).  N?p ?? canxi và vitamin D. Ki?m tra nhãn trên th?c ph?m và h??ng t?i 100% clark RDA (m?c tiêu th? hàng ngày ???c khuy?n ngh?).  Khám ??nh k?  Khám và v? sinh r?ng mi?ng 6 tháng m?t l?n.  G?p nhóm ch?m sóc s?c kh?e c?a quý v? hàng n?m?? trao ??i v?:  B?t k? thay ??i nào v? s?c kh?e c?a quý v?.  B?t k? lo?i thu?c nào mà nhóm ch?m sóc c?a quý v? ?ã kê ??n.  Ch?m sóc d? phòng, k? ho?ch hóa srikanth ?ình và cách ng?n ng?a các b?nh mãn tính.  Tiêm ch?ng (v?c-rajiv)   Tiêm " phòng HPV (??n 26 tu?i), n?u quý v? ch?a t?ng tiêm lo?i v?c-rajiv này tr??c ?ó.  Tiêm phòng viêm destini B (??n 59 tu?i), n?u quý v? ch?a t?ng tiêm lo?i v?c-rajiv này tr??c ?ó.  Tiêm phòng COVID-19: Tiêm v?c-rajiv này khi ??n h?n.  Tiêm phòng cúm: Tiêm phòng cúm hàng n?m.  Tiêm phòng u?n ván: Tiêm phòng u?n ván 10 n?m m?t l?n.  Tiêm phòng ph? c?u khu?n, viêm destini A và RSV: Hãy h?i nhóm ch?m sóc c?a quý v? xem li?u quý v? có c?n nh?ng m?i tiêm này hay không d?a trên nguy c? joyce?m b?nh c?a quý v?.  Tiêm phòng Rosmery th?n kinh (dành cho tu?i t? 50 tr? lên).  Xét khushbu?m s?c kh?e t?ng quát  Sàng l?c b?nh ti?u ???ng:  B?t ??u t? tu?i 35, Khám sàng l?c b?nh ti?u ???ng ít nh?t 3 n?m m?t l?n.  N?u quý v? d??i 35 tu?i, hãy h?i nhóm ch?m sóc xem quý v? có nên sàng l?c b?nh ti?u ???ng hay không.  Xét khushbu?m cholesterol: T? tu?i 39, b?t ??u xét khushbu?m cholesterol 5 n?m m?t l?n, ho?c th??ng xuyên h?n n?u ???c khuy?n ngh?.  ?o m?t ?? x??ng (DEXA): ? tu?i 50, hãy h?i nhóm ch?m sóc c?a quý v? xem quý v? có nên th?c hi?n xét khushbu?m này ?? phát hi?n b?nh loãng x??ng (x??ng giòn) hay không.  Viêm destini C: ?i xét khushbu?m ít nh?t m?t l?n prasanth ??i.  Khám sàng l?c phình ??ng m?ch ch? b?ng: Trao ??i v?i bác s? c?a quý v? v? vi?c th?c hi?n sàng l?c này n?u quý v?:  ?ã t?ng hút thu?c; và  Là nam gi?i v? m?t sinh h?c; và  Prasanth ?? tu?i t? 65 ??n 75.  STI (b?nh joyce?m trùng whitley ???ng tình d?c)  Tr??c 24 tu?i: Hãy h?i nhóm ch?m sóc c?a quý v? xem quý v? có nên khám sàng l?c STI hay không.  Deann 24 tu?i: Sàng l?c STI n?u quý v? có nguy c? m?c b?nh. Quý v? có nguy c? m?c các b?nh STI (alie g?m c? HIV) n?u:  Quý v? có bhavya h? tình d?c tich c?c v?i h?n m?t ng??i.  Quý v? không s? d?ng alie wyatt espinoza m?i lúc.  Quý v? ho?c b?n tình ???c ch?n ?oán m?c b?nh joyce?m b?nh lây whitley ???ng tình d?c.  N?u quý v? có nguy c? joyce?m HIV, hãy h?i jules thu?c PrEP ?? ng?n ng?a HIV.  ?i xét khushbu?m HIV ít nh?t m?t l?n prasanth ??i, cho dù quantonio v? có helga c? joyce?m HIV hay không.  Xét khushbu?m sàng  l?c xin th?  Sàng l?c xin th? c? t? cung: N?u quý v? có c? t? cung, hãy b?t ??u làm xét khushbu?m sàng l?c xin th? c? t? cung th??ng xuyên t? tu?i 21. H?u h?t nh?ng ng??i khám sàng l?c th??ng xuyên v?i k?t qu? bình th??ng ??u có th? ng?ng khám chiquis 65 tu?i. Hãy trao ??i v? v?n ?? này v?i bác s? c?a quý v?.  Quét xin th? vú (ch?p marilyn chemo?n vú): N?u quý v? có hay t?ng có vú, hãy b?t ??u ch?p marilyn chemo?n vú th??ng xuyên b?t ??u t? tu?i 40. ?ây là quá trình quét ?? ki?m tra xin th? vú.  Sàng l?c xin th? ??i tràng: C?n b?t ??u sàng l?c xin th? ??i tràng t? tu?i 45.  Th?c hi?n xét khushbu?m n?i soi ??i tràng 10 n?m m?t l?n (ho?c th??ng xuyên h?n n?u quý v? có nguy c? m?c b?nh) Ho?c, hãy h?i nhà cung c?p c?a quý v? v? các xét khushbu?m phân nh? xét khushbu?m FIT hàng n?m ho?c xét khushbu?m Cologuard 3 n?m m?t l?n.  ?? tìm hi?u thêm v? các tùy ch?n th? khushbu?m c?a quý v?, hãy truy c?p: [email or web address].  ?? ???c h? tr? ??a ra luis miguel?t ??nh, hãy truy c?p: [email or web address].  Xét khushbu?m sàng l?c xin th? chemo?n ti?n li?t: N?u quý v? có chemo?n ti?n li?t và ? ?? tu?i t? 55 ??n 69, hãy h?i bác s? xem vi?c xét khushbu?m sàng l?c xin th? chemo?n ti?n li?t hàng n?m có ?em l?i l?i ích gì cho quý v? hay không.  Sàng l?c xin th? ph?i: N?u quý v? ?ã t?ng ho?c còn ?ang hút thu?c và t? 50 ??n 80 tu?i, hãy h?i nhóm ch?m sóc c?a quý v? xem vi?c sàng l?c xin th? ph?i th??ng xuyên có phù h?p v?i quý v? hay không.    Ch? nh?m m?c ?ích gilson kh?o. Không th? thay th? l?i khuyên c?a nhà cung c?p d?ch v? ch?m sóc s?c kh?e c?a quý v?. B?n luis miguel?n   2023 [facility].   B?o l?u m?i luis miguel?n. ???c ?ánh giá lâm sàng b?i [facility]. [employer] [id number] - REV 04/24.  Preventing Falls: Care Instructions  Injuries and health problems such as trouble walking or poor eyesight can increase your risk of falling. So can some medicines. But there are things you can do to help prevent falls. You can exercise to get stronger. You can also arrange  "your home to make it safer.    Talk to your doctor about the medicines you take. Ask if any of them increase the risk of falls and whether they can be changed or stopped.   Try to exercise regularly. It can help improve your strength and balance. This can help lower your risk of falling.     Practice fall safety and prevention.    Wear low-heeled shoes that fit well and give your feet good support. Talk to your doctor if you have foot problems that make this hard.  Carry a cellphone or wear a medical alert device that you can use to call for help.  Use stepladders instead of chairs to reach high objects. Don't climb if you're at risk for falls. Ask for help, if needed.  Wear the correct eyeglasses, if you need them.    Make your home safer.    Remove rugs, cords, clutter, and furniture from walkways.  Keep your house well lit. Use night-lights in hallways and bathrooms.  Install and use sturdy handrails on stairways.  Wear nonskid footwear, even inside. Don't walk barefoot or in socks without shoes.    Be safe outside.    Use handrails, curb cuts, and ramps whenever possible.  Keep your hands free by using a shoulder bag or backpack.  Try to walk in well-lit areas. Watch out for uneven ground, changes in pavement, and debris.  Be careful in the winter. Walk on the grass or gravel when sidewalks are slippery. Use de-icer on steps and walkways. Add non-slip devices to shoes.    Put grab bars and nonskid mats in your shower or tub and near the toilet. Try to use a shower chair or bath bench when bathing.   Get into a tub or shower by putting in your weaker leg first. Get out with your strong side first. Have a phone or medical alert device in the bathroom with you.   Where can you learn more?  Go to https://www.[employer].net/patiented  Enter G117 in the search box to learn more about \"Preventing Falls: Care Instructions.\"  Current as of: July 17, 2023               Content Version: 14.0    1218-8574 Healthwise, " Incorporated.   Care instructions adapted under license by your healthcare professional. If you have questions about a medical condition or this instruction, always ask your healthcare professional. Healthwise, Incorporated disclaims any warranty or liability for your use of this information.      Learning About Sleeping Well  What does sleeping well mean?     Sleeping well means getting enough sleep to feel good and stay healthy. How much sleep is enough varies among people.  The number of hours you sleep and how you feel when you wake up are both important. If you do not feel refreshed, you probably need more sleep. Another sign of not getting enough sleep is feeling tired during the day.  Experts recommend that adults get at least 7 or more hours of sleep per day. Children and older adults need more sleep.  Why is getting enough sleep important?  Getting enough quality sleep is a basic part of good health. When your sleep suffers, your physical health, mood, and your thoughts can suffer too. You may find yourself feeling more grumpy or stressed. Not getting enough sleep also can lead to serious problems, including injury, accidents, anxiety, and depression.  What might cause poor sleeping?  Many things can cause sleep problems, including:  Changes to your sleep schedule.  Stress. Stress can be caused by fear about a single event, such as giving a speech. Or you may have ongoing stress, such as worry about work or school.  Depression, anxiety, and other mental or emotional conditions.  Changes in your sleep habits or surroundings. This includes changes that happen where you sleep, such as noise, light, or sleeping in a different bed. It also includes changes in your sleep pattern, such as having jet lag or working a late shift.  Health problems, such as pain, breathing problems, and restless legs syndrome.  Lack of regular exercise.  Using alcohol, nicotine, or caffeine before bed.  How can you help  "yourself?  Here are some tips that may help you sleep more soundly and wake up feeling more refreshed.  Your sleeping area   Use your bedroom only for sleeping and sex. A bit of light reading may help you fall asleep. But if it doesn't, do your reading elsewhere in the house. Try not to use your TV, computer, smartphone, or tablet while you are in bed.  Be sure your bed is big enough to stretch out comfortably, especially if you have a sleep partner.  Keep your bedroom quiet, dark, and cool. Use curtains, blinds, or a sleep mask to block out light. To block out noise, use earplugs, soothing music, or a \"white noise\" machine.  Your evening and bedtime routine   Create a relaxing bedtime routine. You might want to take a warm shower or bath, or listen to soothing music.  Go to bed at the same time every night. And get up at the same time every morning, even if you feel tired.  What to avoid   Limit caffeine (coffee, tea, caffeinated sodas) during the day, and don't have any for at least 6 hours before bedtime.  Avoid drinking alcohol before bedtime. Alcohol can cause you to wake up more often during the night.  Try not to smoke or use tobacco, especially in the evening. Nicotine can keep you awake.  Limit naps during the day, especially close to bedtime.  Avoid lying in bed awake for too long. If you can't fall asleep or if you wake up in the middle of the night and can't get back to sleep within about 20 minutes, get out of bed and go to another room until you feel sleepy.  Avoid taking medicine right before bed that may keep you awake or make you feel hyper or energized. Your doctor can tell you if your medicine may do this and if you can take it earlier in the day.  If you can't sleep   Imagine yourself in a peaceful, pleasant scene. Focus on the details and feelings of being in a place that is relaxing.  Get up and do a quiet or boring activity until you feel sleepy.  Avoid drinking any liquids before going to bed " "to help prevent waking up often to use the bathroom.  Where can you learn more?  Go to https://www.[employer].net/patiented  Enter J942 in the search box to learn more about \"Learning About Sleeping Well.\"  Current as of: July 10, 2023  Content Version: 14.1 2006-2024 [employer].   Care instructions adapted under license by your healthcare professional. If you have questions about a medical condition or this instruction, always ask your healthcare professional. Healthwise, [employer] disclaims any warranty or liability for your use of this information.       "

## 2024-09-06 LAB — HBV DNA SERPL NAA+PROBE-ACNC: NOT DETECTED IU/ML

## 2024-09-06 NOTE — RESULT ENCOUNTER NOTE
Franciscocoreen Rissashaneka Hodge Acosta,    Attached are your test results.  -Normal red blood cell (hgb) levels, normal white blood cell count and normal platelet levels.  -Cholesterol levels are at your goal levels.  ADVISE: continuing your medication, a regular exercise program with at least 150 minutes of aerobic exercise per week, and eating a low saturated fat/low carbohydrate diet.  Also, you should recheck this fasting cholesterol panel in 12 months.  -Liver and gallbladder tests (ALT,AST, Alk phos,bilirubin) are significantly elevated. ADVISE: further testing - recheck in 6 months   -Kidney function (GFR) is normal.  -Sodium is normal.  -Potassium is normal.  -Calcium is normal.  -Glucose is elevated due to your diabetes.  -A1C (test of diabetes control the last 2-3 months) is at your goal. Please continue with your current plan. Also, you should make an appointment video is fine  and recheck your A1C test in 6 months.   -TSH (thyroid stimulating hormone) level is normal which indicates normal thyroid function.  -Microalbumin (urine protein) test is normal.  ADVISE: rechecking this annually.   Please contact us if you have any questions.    Cherie Covington, CNP

## 2024-09-09 NOTE — RESULT ENCOUNTER NOTE
Kishor Acosta,    Attached are your test results.  No detected viral load which is reassuring    Please contact us if you have any questions.    Cherie Covington, CNP

## 2024-10-09 ENCOUNTER — PATIENT OUTREACH (OUTPATIENT)
Dept: GERIATRIC MEDICINE | Facility: CLINIC | Age: 74
End: 2024-10-09
Payer: MEDICARE

## 2024-10-09 NOTE — PROGRESS NOTES
"[facility] Care Coordination Contact    Per CC, mailed client an \"Unable to Contact\" letter.    Nloa Pereira  Case Management Specialist  [facility]  [phone]        "

## 2024-10-09 NOTE — LETTER
October 9, 2024    CHARLI DONOVAN LIPSCOMB  [address]   [facility] [address]    Dear Charli:     I m your care coordinator. I ve been unable to reach you by phone. I am writing to ask you or your authorized representative to call me at [phone]. If you reach my voicemail, leave a message with your daytime phone number. Include a date and time that I can call you. If you are hearing impaired, call the [place] [employer] at 329 or [phone] (hydsot-nj-[id number] relay service).    The reason I am trying to reach you is:     [] To schedule an assessment  [x] For your six (6)-month check-in  [] Other:      Please call me as soon as you receive this letter. I look forward to speaking with you.    Sincerely,    Marifer Barboza RN, BSN, PHN  [phone]  [email or web address]        [id number] accepted  [id number]                                                                        B  (08/2022)

## 2024-10-11 NOTE — PROGRESS NOTES
[facility] Care Coordination Contact      [facility] Six-Month Telephone Assessment  Unable to contact member x4.   Will try calling member in 6 months for an annual health risk assessment.   Marifer Barboza RN  [facility]  [phone]

## 2024-10-24 DIAGNOSIS — E78.5 HYPERLIPIDEMIA LDL GOAL <100: ICD-10-CM

## 2024-10-24 RX ORDER — PRAVASTATIN SODIUM 20 MG
20 TABLET ORAL DAILY
Qty: 90 TABLET | Refills: 3 | OUTPATIENT
Start: 2024-10-24

## 2024-11-16 DIAGNOSIS — G89.29 CHRONIC PAIN OF RIGHT ANKLE: ICD-10-CM

## 2024-11-16 DIAGNOSIS — M25.571 CHRONIC PAIN OF RIGHT ANKLE: ICD-10-CM

## 2024-11-26 NOTE — PROGRESS NOTES
Kishor Acosta,    Attached are your test results.  -Cholesterol levels are at your goal levels.  ADVISE: Continuing your medication, a regular exercise program with at least 30 minutes of aerobic exercise 3-4 days/week ( 45 minutes 4-6 days/week if weight loss needed), and a low saturated fat,/low carbohydrate diet are helpful to maintain this.  Rechecking your fasting cholesterol panel in 12 months is recommended (Lipid w/ LDL reflex).   Please contact us if you have any questions.    Cherie Covingotn, CNP   Sources of Protein    Food Label: The 10-10 Rule    Making Sense of Food Labels     Aim for 25 grams of fiber per day (minimum) - use Benefiber if you need a bump in fiber.     Low-Impact Workout Videos (click on title for link)  Improved Health - effective low impact workouts (gentle, beginner and intermediate level workouts)  Vitality Life with Sandro Black - exercises are completed seated, standing or a combination of both making the exercise just right for them  Kelly's Fit Pilates -  easy to follow Pilates, Strength & Cardio workouts - look for wall pilates workouts here!  Nourish.Move.Love - quick + effective home workouts that are 10-30 minutes  Body Project - low impact workout videos that includes HIIT cardio, resistance training, pilates and yoga  Walk at Home - low impact, easy to follow movements to walk in place  [id number] - easy to follow, seated or standing workouts for beginners!  Chair Fit Camp - full-body workout from your chair        Eat Better ? Move More ? Live Well    Eat 3 nutrient-rich meals each day     Don't skip meals--it will cause you to overeat later in the day!     Eating fiber (vegetables/fruits/whole grains) and protein with meals helps you stay full longer     Choose foods with less than 10 grams of sugar and 5 grams of fat per serving to prevent excess calories and weight re-gain   Eat around the same times each day to develop a routine eating schedule    Avoid snacking unless physically hungry.   Planned snacks: 1-2 times per day and no more than 150 calories    Eat protein first    Protein helps with healing, maintaining adequate muscle mass, reducing hunger and optimizing nutritional status    Aim for  grams of protein per day   Fill up on Fiber    Fiber comes from plants--fruits, veggies, whole grains, nuts/seeds and beans    Fiber is low in calories, high in phytonutrients and helps you stay full longer    Aim for 25-35 grams per day by eating fiber with meals and  snacks  Eat [id number]    Take 20-30 minutes to eat each meal by taking small bites, chewing foods to applesauce consistency or 20-30 times before you swallow    Eating foods too fast can delay satiety/fullness signals and increase overeating   Slow down your eating by using toddler utensils, putting your fork/spoon down between bites and not watching TV or emailing during meals!   Keep a Journal          Writing down what you eat, how you feel and when you are active helps you identify new changes to work on from week to week          Look for ways to cut 100 calories from your current diet 2-3 times per day  Drink 64 ounces of 0-Calorie drinks between meals    Water    Zero calorie [employer]el  or Vitamin Water      SoBe [employer]ater  Zero Calories    Crystal Light , Sugar-Free Ejz-Aid , and other sugar-free lemonade or flavored yadav    Keep Caffeine to less than 300mg per day ie: 3-6oz cups coffee     Work up to 45-60 minutes of physical activity most days of the week    Helps with losing weight and prevent regaining those extra pounds!     Do a combo of cardio (walking/water exercises) and strength training (lifting weights/Vinyasa yoga)    Avoid Mindless Eating    Be present when you eat--take note of the smell, taste and quality of your food    Make a list of alternative activities you could do to prevent eating out of boredom/stress  Go for a walk, call a friend, chew gum, paint your nails, re-organize the garage, etc      LEAN PROTEIN SOURCES    Protein Source Portion Calories Grams of Protein                           Nonfat, plain Greek yogurt    (10 grams sugar or less) 3/4 cup (6 oz)  12-17   Light Yogurt (10 grams sugar or less) 3/4 cup (6 oz)  6-8   Protein Shake 1 shake 110-180 15-30   Skim/1% Milk or lactose-free milk 1 cup ( 8 oz)  8   Plain or light, flavored soymilk 1 cup  7-8   Plain or light, hemp milk 1 cup 110 6   Fat Free or 1% Cottage Cheese 1/2 cup 90 15   Part skim  ricotta cheese 1/2 cup 100 14   Part skim or reduced fat cheese slices 1/4cup, 3 dice 65-80 8     Mozzarella String Cheese 1 80 8   Canned tuna, chicken, crab or salmon  (canned in water)  1/2 cup 100 15-20   White fish (broiled, grilled, baked) 3 ounces 100 21   [place]/Tuna (broiled, grilled, baked) 3 ounces 150-180 21   Shrimp, Scallops, Lobster, Crab 3 ounces 100 21   Pork loin, Pork Tenderloin 3 ounces 150 21   Boneless, skinless chicken /turkey breast                          (broiled, grilled, baked) 3 ounces 120 21   [place], [place], [place], and Venison 3 ounces 120 21   Lean cuts of red meat and pork (sirloin,   round, tenderloin, flank, ground 93%-96%) 3 ounces 170 21   Lean or Extra Lean Ground Turkey 1/2 cup 150 20   90-95% Lean [place] Burger 1 guillermo 140-180 21   Low-fat casserole with lean meat 3/4 cup 200 17   Luncheon Meats                                                        (turkey, lean ham, roast beef, chicken) 3 ounces 100 21   Egg (boiled, poached, scrambled) 1 Egg 60 7   Egg Substitute 1/2 cup 70 10   Nuts (limit to 1 serving per day)  3 Tbsp. 150 7   Nut Berino (peanut, almond)  Limit to 1 serving or less daily 1 Tbsp. 90 4   Soy Burger (varies) 1  10-15   Edamame  1/2 cup ~95 9   Garbanzo, Black, Jama Beans 1/2 cup 110 7   Refried Beans 1/2 cup 100 7   Kidney and Lima beans 1/2 cup 110 7   Tempeh 3 oz 175 18   Vegan crumbles 1/2 cup 100 14   Tofu 1/2 cup 110 14   Chili (beans and extra lean beef or turkey) 1 cup 200 23   Lentil Stew/Soup 1 cup 150 12   Black Bean Soup 1 cup 175 12     Carbohydrates  Carbohydrates fuel your body with glucose (sugar)--the energy your body needs so you can do your daily activities.  Carbohydrates offer an immediate source of energy for your body. They provide the fuel for your muscles and organs, such as your brain.     Types of Carbohydrates     Complex Carbohydrates are higher in fiber and keep you feeling full longer--helping you eat less.   These are  found in nearly all plant-based foods and usually take longer for the body to digest.  They are most commonly found in whole-wheat bread, whole-grain pasta, brown rice, starchy vegetables,   and fruits  Refined Carbohydrates require almost NO WORK for digestion and break down into glucose more quickly   than complex carbohydrates. Refined carbohydrates are usually high in calories and low in nutrients and fiber--  eating more of these can lead to weight gain.  Thinking about eliminating carbohydrates???  If you do not eat enough carbohydrates, the following can occur:  Fatigue  Muscle cramps  Poor mental function  Fatigue easily results from deprivation of carbohydrates, which is seen in people who fast, possibly interfering with activities of daily living.      Thinking about eliminating carbohydrates???  If you do not eat enough carbohydrates, the following can occur:  Fatigue  Muscle cramps  Poor mental function  Fatigue easily results from deprivation of carbohydrates, which is seen in people who fast, possibly interfering with activities of daily living    Carbohydrates are your body's first choice for fuel. If given a choice of several types of foods simultaneously, your body will use the energy from carbohydrates first.    What foods contain carbohydrates?  Choose the following foods containing carbohydrates (the BEST ones to eat):   Fruit-fresh, frozen, canned in their own juices  Whole grains:  Whole-wheat breads  Brown rice  Oatmeal  Whole-grain cereals  Other starchy foods containing a minimum of 3 grams (g) fiber/100 calories  The ingredient label should list whole wheat or whole grain as one of the first ingredients (bulgur, quinoa, buckwheat, millet, spelt, faro, kasha)  Milk or yogurt (a natural source of carbohydrates):  Low-fat milk  Fat-free milk  Yogurt   Beans or legumes     Starchy vegetables, raw or frozen:  Potatoes  Peas  Corn    AVOID or limit the following foods containing  carbohydrates:  Refined sugars, such as in:  Candy  Desserts-ice cream, cakes, pies, brownies, frozen yogurt, sherbet/sorbet  Cookies  White flour: bread/pasta/crackers/rice/tortillas  Sugary snacks: sweetened cereal, granola bars, cereal bars, donuts, muffins, bagels  Sugary Drinks:  Fruit Juice, Smoothies  Sports Drinks  Regular Soda    What are typical serving sizes or portions?  The following are some serving and portion sizes for foods containing carbohydrates:  One medium piece of fruit, about 4?5 ounces (oz) (-tennis ball)  1 cup (C) berries or melon    C canned fruit    C juice (100% vegetable)    C starchy vegetables, cooked or chopped  One slice whole-grain bread  ? C brown rice, quinoa, buckwheat, millet, spelt, faro, kasha    C oatmeal (dry)    C bulgur  One small tortilla (less than 6inch diameter)    C wheat germ  1 oz pretzels     C flaked cereal        Calorie-Controlled Sample Meal Plans    Examples of small healthy meals    Breakfast   Omelet made with   cup to   cup egg substitute or 2 eggs    cup chopped vegetables  1-2 tbsp. of light cheese     cup salsa  Medium banana    1 cup non-fat plain, Greek yogurt mixed with 1 cup berries and 1-2 Tbsp nuts or cereal   -3/4 cup skim or 1% cottage cheese    cup unsweetened whole-grain cereal  1/2 cup of fresh strawberries  Whole-wheat English muffin or mini bagel, 1 scrambled egg and 1 slice Swiss cheese   Small orange  Protein Bar or Shake (15-30 grams protein and 15-25 grams Carbohydrates)    cup cottage cheese, low-fat    cup fresh fruit    11 ounces of Slim Fast Low Carb (only), Ezra's Advantage, EAS Carb Control    Lunch/Dinner  2-3 slices roasted turkey breast  1 tbsp. of fat free mayonnaise  2 slices of  whole-wheat bread, Medium apple  10 baby carrots with 1 tbsp. of low-fat dip     cup water packed tuna or chicken  1 tablespoons of low-fat mayonnaise  1-2 tbsp. dill relish  1 serving of whole-grain crackers  1 cup of strawberries   6 inch turkey  sub sandwich with light mayonnaise,   cup cottage cheese                                                                                                                                                      Black bean and low-fat cheese on a whole wheat tortilla with salsa and light sour cream  Grilled chicken sandwich  Tossed salad with light dressing    Baked potato with 3/4 cup of extra lean ground beef, light shredded cheese and salsa  Fresh fruit                                                 Chicken chunks with lettuce and vegetables stuffed in brook  Steamed broccoli                                                 3 oz boneless/skinless chicken breast  1/2 cup brown rice with stir-fried vegetables    grapefruit  3 ounces of salmon, trout, or tuna  1 cup of steamed asparagus  1 small slice whole grain Italian bread  Broiled white or pink fish  3/4 cup whole wheat pasta with tomatoes  3/4 cup of roasted red peppers  3 oz. of extra lean (93/7) hamburger on a Arnold[employer] [place] Thins  Tossed salad with light dressing       Black bean or [place] bean soup with grated mozzarella cheese    of a flour tortilla    3 ounces of grilled pork loin with 1 tbsp. of low-sugar barbeque sauce, 1 cup of green beans seasoned with pepper  Small dinner roll or   cup of grapefruit sections    1-2 cups of torn jaswinder    cup of garbanzo beans or diced skinless chicken breast  5-6 cherry tomatoes  1  tbsp. of crumbled feta cheese  1 tbsp. of roasted soy nuts  1 tsp. of olive oil and 2-3 Tbsp. of balsamic or red wine vinegar  Small whole-wheat dinner roll or   cup of cut up pineapple     150 Calories or Less Snack Ideas   1 hardboiled egg with   cup berries  1 small apple with 1 hardboiled egg  10 almonds with   cup berries  2 clementines with 1 light string cheese  1 light string cheese with   sliced apple  1 light string cheese wrapped in 2 slices of turkey  5 100% whole wheat crackers (e.g. Triscuit) with 1 light string cheese    c.  cottage cheese with   cup fruit and 1 Tbsp sunflower seeds     cup cottage cheese with   of an avocado     can tuna fish with 1 cup sliced cucumbers   2 oz turkey slices with 1 cup carrots  1 container (6 oz) of low sugar (less than 10 grams of sugar) greek yogurt   3 Tablespoons of hummus with 1 cup sliced bell peppers, carrots or vegetable of your choice  4 Tablespoons ranch dip made with plain Greek Yogurt and 3 mini cucumbers  1/4 cup nuts (any kind)  1 Tablespoon peanut butter with 1 stalk celery   1 dill pickle wrapped in 1-2 slices of deli ham with 1 tsp of light cream cheese  5 100% whole wheat crackers (e.g. Triscuit) with 1 tsp each of guacamole/avocado topped with a cherry tomato - season with pepper or Everything Bagel Seasoning

## 2024-12-06 DIAGNOSIS — E11.65 TYPE 2 DIABETES MELLITUS WITH HYPERGLYCEMIA, WITHOUT LONG-TERM CURRENT USE OF INSULIN (H): ICD-10-CM

## 2024-12-06 NOTE — TELEPHONE ENCOUNTER
First attempt. Attempted to contact patient using language line x2 per refill team as requested:      Unable to connect to patient. Will send patient a [employer] message. Reinier Welsh RN, BSN

## 2024-12-06 NOTE — TELEPHONE ENCOUNTER
Clinic RN: Please investigate patient's chart or contact patient if the information cannot be found because the medication is listed as historical or discontinued. Confirm patient is taking this medication. Document findings and route refill encounter to provider for approval or denial.    Alma Frost RN

## 2024-12-10 RX ORDER — METFORMIN HYDROCHLORIDE 500 MG/1
1000 TABLET, EXTENDED RELEASE ORAL
Qty: 180 TABLET | Refills: 1 | Status: SHIPPED | OUTPATIENT
Start: 2024-12-10

## 2024-12-10 NOTE — TELEPHONE ENCOUNTER
Per pt chart metformin discontinued due to side effects on 6/24/24 please refuse refill.  Lizeth Balderas BSN, RN

## 2025-01-10 ENCOUNTER — ANCILLARY PROCEDURE (OUTPATIENT)
Dept: MAMMOGRAPHY | Facility: CLINIC | Age: 75
End: 2025-01-10
Attending: NURSE PRACTITIONER
Payer: MEDICARE

## 2025-01-10 DIAGNOSIS — Z12.31 ENCOUNTER FOR SCREENING MAMMOGRAM FOR BREAST CANCER: ICD-10-CM

## 2025-01-10 PROCEDURE — 77063 BREAST TOMOSYNTHESIS BI: CPT | Performed by: RADIOLOGY

## 2025-01-10 PROCEDURE — 77067 SCR MAMMO BI INCL CAD: CPT | Performed by: RADIOLOGY

## 2025-01-11 DIAGNOSIS — K21.9 GASTROESOPHAGEAL REFLUX DISEASE WITHOUT ESOPHAGITIS: ICD-10-CM

## 2025-01-23 ENCOUNTER — TRANSFERRED RECORDS (OUTPATIENT)
Dept: MULTI SPECIALTY CLINIC | Facility: CLINIC | Age: 75
End: 2025-01-23
Payer: MEDICARE

## 2025-01-23 LAB — RETINOPATHY: NORMAL

## 2025-01-27 ENCOUNTER — TELEPHONE (OUTPATIENT)
Dept: FAMILY MEDICINE | Facility: CLINIC | Age: 75
End: 2025-01-27

## 2025-01-27 ENCOUNTER — OFFICE VISIT (OUTPATIENT)
Dept: FAMILY MEDICINE | Facility: CLINIC | Age: 75
End: 2025-01-27
Attending: NURSE PRACTITIONER
Payer: MEDICARE

## 2025-01-27 VITALS
BODY MASS INDEX: 25.49 KG/M2 | HEIGHT: 61 IN | SYSTOLIC BLOOD PRESSURE: 127 MMHG | HEART RATE: 91 BPM | OXYGEN SATURATION: 99 % | TEMPERATURE: 97.9 F | RESPIRATION RATE: 16 BRPM | WEIGHT: 135 LBS | DIASTOLIC BLOOD PRESSURE: 79 MMHG

## 2025-01-27 DIAGNOSIS — E11.65 TYPE 2 DIABETES MELLITUS WITH HYPERGLYCEMIA, WITHOUT LONG-TERM CURRENT USE OF INSULIN (H): Primary | ICD-10-CM

## 2025-01-27 DIAGNOSIS — I10 HTN, GOAL BELOW 130/80: ICD-10-CM

## 2025-01-27 LAB
ANION GAP SERPL CALCULATED.3IONS-SCNC: 11 MMOL/L (ref 7–15)
BUN SERPL-MCNC: 16.4 MG/DL (ref 8–23)
CALCIUM SERPL-MCNC: 8.8 MG/DL (ref 8.8–10.4)
CHLORIDE SERPL-SCNC: 103 MMOL/L (ref 98–107)
CREAT SERPL-MCNC: 0.74 MG/DL (ref 0.51–0.95)
EGFRCR SERPLBLD CKD-EPI 2021: 84 ML/MIN/1.73M2
EST. AVERAGE GLUCOSE BLD GHB EST-MCNC: 186 MG/DL
GLUCOSE SERPL-MCNC: 124 MG/DL (ref 70–99)
HBA1C MFR BLD: 8.1 % (ref 0–5.6)
HCO3 SERPL-SCNC: 23 MMOL/L (ref 22–29)
POTASSIUM SERPL-SCNC: 4.8 MMOL/L (ref 3.4–5.3)
SODIUM SERPL-SCNC: 137 MMOL/L (ref 135–145)

## 2025-01-27 PROCEDURE — 83036 HEMOGLOBIN GLYCOSYLATED A1C: CPT | Performed by: NURSE PRACTITIONER

## 2025-01-27 PROCEDURE — 36415 COLL VENOUS BLD VENIPUNCTURE: CPT | Performed by: NURSE PRACTITIONER

## 2025-01-27 PROCEDURE — 99214 OFFICE O/P EST MOD 30 MIN: CPT | Performed by: NURSE PRACTITIONER

## 2025-01-27 PROCEDURE — 80048 BASIC METABOLIC PNL TOTAL CA: CPT | Performed by: NURSE PRACTITIONER

## 2025-01-27 RX ORDER — LISINOPRIL 10 MG/1
10 TABLET ORAL DAILY
Qty: 90 TABLET | Refills: 1 | Status: SHIPPED | OUTPATIENT
Start: 2025-01-27

## 2025-01-27 ASSESSMENT — PAIN SCALES - GENERAL: PAINLEVEL_OUTOF10: MODERATE PAIN (5)

## 2025-01-27 NOTE — TELEPHONE ENCOUNTER
First attempt. Left message for patient to return call per Cherie Covington NP:        RN, if/when patient returns call, please review message as shown. Reinier Welsh, RN, BSN

## 2025-01-27 NOTE — PATIENT INSTRUCTIONS
PLAN:   1.   Symptomatic therapy suggested: increase lisinopril to 10 mg a day   Continue Jardiance at 10 mg a day.  2.  Orders Placed This Encounter   Medications    lisinopril (ZESTRIL) 10 MG tablet     Sig: Take 1 tablet (10 mg) by mouth daily.     Dispense:  90 tablet     Refill:  1     Orders Placed This Encounter   Procedures    Basic metabolic panel    Hemoglobin A1c       3.Will follow up and/or notify patient of  results via My Chart to determine further need for followup   Schedule PE in September    Patient needs to follow up in if no improvement,or sooner if worsening of symptoms or other symptoms develop.

## 2025-01-27 NOTE — PROGRESS NOTES
Divina Kwok is a 74 year old, presenting for the following health issues:  Hypertension and Diabetes (Overall health)      1/27/2025     1:25 PM   Additional Questions   Roomed by Nataly   Accompanied by self         1/27/2025     1:25 PM   Patient Reported Additional Medications   Patient reports taking the following new medications no     Via the Health Maintenance questionnaire, the patient has reported the following services have been completed -Eye Exam: [facility] 2025-01-23, this information has been sent to the abstraction team.  History of Present Illness       Hypertension: She presents for follow up of hypertension.  She does check blood pressure  regularly outside of the clinic. Outside blood pressures have been over 140/90. She follows a low salt diet.     She eats 2-3 servings of fruits and vegetables daily.She consumes 0 sweetened beverage(s) daily.She exercises with enough effort to increase her heart rate 20 to 29 minutes per day.  She exercises with enough effort to increase her heart rate 5 days per week.   She is taking medications regularly.       Hypertension Follow-up    Do you check your blood pressure regularly outside of the clinic? Yes   Are your blood pressures ever more than 140 on the top number (systolic) OR more than 90 on the bottom number (diastolic)? (For example, greater than 140/90) Yes   Are you following a low salt diet? Yes     How many servings of fruits and vegetables do you eat daily? 2-3   On average, how many sweetened beverages do you drink each day (Examples: soda, juice, sweet tea, etc.  Do NOT count diet or artificially sweetened beverages)? 0   How many minutes a day do you exercise enough to make your heart beat faster? 20 to 29   How many days a week do you exercise enough to make your heart beat faster? 5   How many days per week do you miss taking your medication? 0     Diabetes Follow-up    How often are you checking your blood sugar? { :487208}  What  concerns do you have today about your diabetes? { :457320}   Do you have any of these symptoms? (Select all that apply)  { :701437}      BP Readings from Last 2 Encounters:   01/27/25 127/79   09/05/24 129/83     Hemoglobin A1C (%)   Date Value   09/05/2024 7.5 (H)   06/28/2024 7.6 (H)   05/11/2021 6.9 (H)   02/11/2020 6.8 (H)     LDL Cholesterol Calculated (mg/dL)   Date Value   09/05/2024 103 (H)   08/22/2023 100   05/11/2021 109 (H)   02/11/2020 86       {Reference  Diabetes Management Resources  Blood Glucose Log - 3 weeks  Blood Glucose Log with Food and Insulin Record :000770}  Lab work is in process  Labs reviewed in EPIC  BP Readings from Last 3 Encounters:   01/27/25 127/79   09/05/24 129/83   06/28/24 130/86    Wt Readings from Last 3 Encounters:   01/27/25 61.2 kg (135 lb)   09/05/24 59.9 kg (132 lb)   06/28/24 59.4 kg (130 lb 14.4 oz)                  Patient Active Problem List   Diagnosis    CARDIOVASCULAR SCREENING; LDL GOAL LESS THAN 160    DDD (degenerative disc disease), cervical    DDD (degenerative disc disease), lumbar    Vitamin D insufficiency    Elevated LFTs    Positive PPD    Bilateral low back pain without sciatica    Lumbar radiculopathy    Closed fracture of lumbar vertebra (H)    Spinal stenosis of lumbar region    Viral hepatitis    Type 2 diabetes mellitus with hyperglycemia, without long-term current use of insulin (H)    Osteopenia    Neuropathic pain, leg    Exposure to hepatitis B    Spinal stenosis of lumbar region without neurogenic claudication    Osteoporosis    Hyperlipidemia LDL goal <100    Viral hepatitis B chronic (H)    HTN, goal below 130/80     Past Surgical History:   Procedure Laterality Date    BACK SURGERY  2011    [facility]    CATARACT IOL, RT/LT Right 2005    COLONOSCOPY N/A 3/3/2020    Procedure: Colonoscopy, With Polypectomy And Biopsy;  Surgeon: Arlet Zaragoza DO;  Location: [facility] OR    COLONOSCOPY WITH CO2 INSUFFLATION N/A 3/3/2020     Procedure: COLONOSCOPY, WITH CO2 INSUFFLATION;  Surgeon: Arlet Zaragoza DO;  Location:  OR       Social History     Tobacco Use    Smoking status: Never     Passive exposure: Never    Smokeless tobacco: Never   Substance Use Topics    Alcohol use: No     Family History   Problem Relation Age of Onset    Diabetes Sister     Diabetes Son     Coronary Artery Disease Brother     Hypertension Brother     Hyperlipidemia Sister     Macular Degeneration No family hx of     Glaucoma No family hx of     Strabismus No family hx of          Current Outpatient Medications   Medication Sig Dispense Refill    ACCU-CHEK GUIDE test strip USE TO TEST BLOOD SUGAR 1 TIMES DAILY OR AS DIRECTED. 50 strip 1    ascorbic acid (VITAMIN C) 500 MG tablet Take by mouth daily      B Complex Vitamins (VITAMIN-B COMPLEX PO)       blood glucose (BETSY CONTOUR NEXT) test strip Use to test blood sugar 2 times daily or as directed. 100 strip 3    blood glucose (NO BRAND SPECIFIED) test strip Use to test blood sugar  1 to 2  times daily or as directed. To accompany: Blood Glucose Monitor Brands: per insurance. 100 strip 11    blood glucose (NO BRAND SPECIFIED) test strip Use to test blood sugar 1 times daily or as directed. To accompany: Blood Glucose Monitor Brands: per insurance. 100 strip 6    blood glucose calibration (NO BRAND SPECIFIED) solution To accompany: Blood Glucose Monitor Brands: per insurance. 1 Bottle 3    blood glucose monitoring (ACCU-CHEK FASTCLIX) lancets 1 each by In Vitro route daily Use to test blood sugar 1 times daily or as directed. 100 each 11    blood glucose monitoring (BETSY MICROLET) lancets USE TO TEST 1 TO 2 TIMES DAILY, AND AS NEEDED 200 each 3    blood glucose monitoring (NO BRAND SPECIFIED) meter device kit Use to test blood sugar 1 times daily or as directed. Preferred blood glucose meter OR supplies to accompany: Blood Glucose Monitor Brands: per insurance. 1 kit 0    blood glucose monitoring (ONETOUCH VERIO)  meter device kit USE TO TEST BLOOD SUGAR 2 TIMES DAILY OR AS DIRECTED. 1 kit 0    blood glucose monitoring (SOFTCLIX) lancets 1 each by In Vitro route daily USE WITH LANCING DEVICE 1 TIME DAILY. TO ACCOMPANY: BLOOD GLUCOSE MONITOR PER INSURANCE. 100 each 6    calcium carb-cholecalciferol 600-500 MG-UNIT CAPS TAKE 1 CAPSULE BY MOUTH TWICE A  capsule 3    Calcium Carbonate-Vitamin D (CALCIUM-VITAMIN D) 600-125 MG-UNIT TABS TAKE 1 CAPSULE BY MOUTH TWICE A DAY      diclofenac (VOLTAREN) 1 % topical gel APPLY 2 G TOPICALLY 4 TIMES DAILY AS NEEDED FOR MODERATE PAIN. 200 g 1    empagliflozin (JARDIANCE) 10 MG TABS tablet Take 1 tablet (10 mg) by mouth daily. 90 tablet 1    lisinopril (ZESTRIL) 5 MG tablet Take 1 tablet (5 mg) by mouth daily. 90 tablet 1    metFORMIN (GLUCOPHAGE XR) 500 MG 24 hr tablet TAKE 2 TABLETS BY MOUTH DAILY WITH DINNER 180 tablet 1    Multiple Vitamins-Minerals (CHOICEFUL MULTIVITAMIN PO)       naproxen (NAPROSYN) 500 MG tablet Take 1 tablet (500 mg) by mouth 2 times daily as needed. 30 tablet 1    Omega-3 Fatty Acids (FISH OIL CONCENTRATE PO)       omeprazole (PRILOSEC) 20 MG DR capsule TAKE 1 TABLET BY MOUTH EVERY DAY *NOT COVERED, BUY OTC* 90 capsule 0    omeprazole 20 MG tablet Take 1 tablet (20 mg) by mouth daily. 90 tablet 0    ONETOUCH VERIO IQ test strip USE TO TEST BLOOD SUGARS 2 TIMES DAILY OR AS DIRECTED 100 strip 3    ORDER FOR DME Equipment being ordered: walker  Wheeled and seated if needed 1 each 0    ORDER FOR DME Equipment being ordered: TENS 1 Units 0    pravastatin (PRAVACHOL) 20 MG tablet Take 1 tablet (20 mg) by mouth daily. 90 tablet 3     No Known Allergies  Recent Labs   Lab Test 09/05/24  0744 06/28/24  1037 03/15/24  0847 09/07/23  0957 08/29/23  1227 08/22/23  0717 08/26/22  0800 08/26/22  0800 05/11/21  1110 02/11/20  0819   A1C 7.5* 7.6* 8.6*  --   --  8.6*   < > 7.4* 6.9* 6.8*   *  --   --   --   --  100  --  95 109* 86   HDL 56  --   --   --   --  53  --   "52 60 57   TRIG 71  --   --   --   --  93  --  115 84 117   ALT 50  --   --  46 70* 72*   < >  --  40 58*   CR 0.73 0.67 0.72  --   --  0.61   < >  --  0.67 0.59   GFRESTIMATED 85 >90 87  --   --  >90   < >  --  88 >90   GFRESTBLACK  --   --   --   --   --   --   --   --  >90 >90   POTASSIUM 5.2 4.6 4.5  --   --  4.4   < >  --  4.1 4.5   TSH 0.66  --   --   --   --  0.63  --  0.92  --  0.60    < > = values in this interval not displayed.            {ROS Picklists (Optional):262203}      Objective    BP (!) 148/80 (BP Location: Right arm, Patient Position: Sitting, Cuff Size: Adult Regular)   Pulse 91   Temp 97.9  F (36.6  C) (Temporal)   Resp 16   Ht 1.537 m (5' 0.5\")   Wt 61.2 kg (135 lb)   SpO2 99%   BMI 25.93 kg/m    Body mass index is 25.93 kg/m . Second BP: 127/79   Physical Exam   GENERAL: {GENERAL APPEARANCE:437772},{PPD Exam:5021::\"in no apparent distress\"}  EYES: { EXAM CPE - EYES:604055}  HENT:{ EXAM CPE - HENT:847577}   NECK:{PE - NECK:5327::\"normal\",\"supple\",\"no adenopathy\"}  CARDIAC:{NEMESIO EXAM CV:558871}  {CARDIAC NORMAL/ABNORMAL (CP):37153::\"NORMAL - regular rate and rhythm without murmur.\"}  RESP: {LUNG EXAM:191398::\"clear to auscultation and percussion\"}  {EXAM NCC LUN}  ABD:{NEMESIO EXAM GI:189426}  SKIN: {SKIN EXAM:101::\"Skin color, texture, turgor normal. No rashes or lesions.\"}  MS: {NEMESIO EXAM MS/JOINT:366680::\"extremities normal- no gross deformities noted\",\"gait normal\",\"normal muscle tone\"}  NEURO: { EXAM CPE - NEURO:558262}  PSYCH: {EXAM; NEURO MENTAL STATUS:795981},{NEMESIO PATIENT PSYCH APPEARANCE:642369::\"mentation appears normal.\",\"affect and mood normal\"}        {Diagnostic Test Results (Optional):988408}        Signed Electronically by: CAMILO Benavides CNP  {Email feedback regarding this note to [email or web address]   :964807}  " or lesions.  MS: extremities normal- no gross deformities noted, gait normal, and normal muscle tone  NEURO: Normal strength and tone, sensory exam grossly normal, mentation intact, and speech normal  PSYCH: Alert, oriented, thought content appropriate,mentation appears normal., affect and mood normal        Results for orders placed or performed in visit on 01/27/25   Basic metabolic panel     Status: Abnormal   Result Value Ref Range    Sodium 137 135 - 145 mmol/L    Potassium 4.8 3.4 - 5.3 mmol/L    Chloride 103 98 - 107 mmol/L    Carbon Dioxide (CO2) 23 22 - 29 mmol/L    Anion Gap 11 7 - 15 mmol/L    Urea Nitrogen 16.4 8.0 - 23.0 mg/dL    Creatinine 0.74 0.51 - 0.95 mg/dL    GFR Estimate 84 >60 mL/min/1.73m2    Calcium 8.8 8.8 - 10.4 mg/dL    Glucose 124 (H) 70 - 99 mg/dL   Hemoglobin A1c     Status: Abnormal   Result Value Ref Range    Estimated Average Glucose 186 (H) <117 mg/dL    Hemoglobin A1C 8.1 (H) 0.0 - 5.6 %         Assessment & Plan     Type 2 diabetes mellitus with hyperglycemia, without long-term current use of insulin (H)  foot care discussed and Podiatry visits discussed, annual eye examinations at Ophthalmology discussed, glycohemoglobin monitoring discussed, and long term diabetic complications discussed  Attempt to improve diet  Medication jardiance 10  mg daily    Recheck in 3 months, sooner should new symptoms or   problems arise.   -- Basic metabolic panel  - Hemoglobin A1c  - **Basic metabolic panel FUTURE 2mo  - **Hemoglobin A1c FUTURE 3mo    HTN, goal below 130/80  HTN Plan:  1)  Medication: Increase lisinopril to 10 mg a day   2)  Dietary sodium restriction  3)  Regular aerobic exercise  4)  Recheck in 6 months, sooner should new symptoms or   problems arise.  5) See todays orders.    Patient Education: Reviewed risks of hypertension and principles of   treatment.  Increase lisinopril to 10 mg   - lisinopril (ZESTRIL) 10 MG tablet  Dispense: 90 tablet; Refill: 1  -- Basic metabolic  panel  - **Basic metabolic panel FUTURE 2mo      See Patient Instructions  Patient Instructions   PLAN:   1.   Symptomatic therapy suggested: increase lisinopril to 10 mg a day   Continue Jardiance at 10 mg a day.  2.  Orders Placed This Encounter   Medications    lisinopril (ZESTRIL) 10 MG tablet     Sig: Take 1 tablet (10 mg) by mouth daily.     Dispense:  90 tablet     Refill:  1     Orders Placed This Encounter   Procedures    Basic metabolic panel    Hemoglobin A1c       3.Will follow up and/or notify patient of  results via My Chart to determine further need for followup   Schedule PE in September    Patient needs to follow up in if no improvement,or sooner if worsening of symptoms or other symptoms develop.         Signed Electronically by: CAMILO Benavides CNP

## 2025-01-28 DIAGNOSIS — E11.65 TYPE 2 DIABETES MELLITUS WITH HYPERGLYCEMIA, WITHOUT LONG-TERM CURRENT USE OF INSULIN (H): ICD-10-CM

## 2025-01-29 NOTE — RESULT ENCOUNTER NOTE
Kishor Floodshaneka Hodge Acosta,    Attached are your test results.  -Kidney function (GFR) is normal.  -Sodium is normal.  -Potassium is normal.  -Calcium is normal.  -Glucose is elevated due to your diabetes.  -A1C (test of diabetes control the last 2-3 months) is above your goal. Will increase jardiance to 25 mg a day.  Please recheck your A1C test in 3 months.    Please contact us if you have any questions.    Cherie Covington, CNP

## 2025-04-13 DIAGNOSIS — K21.9 GASTROESOPHAGEAL REFLUX DISEASE WITHOUT ESOPHAGITIS: ICD-10-CM

## 2025-04-14 RX ORDER — OMEPRAZOLE 20 MG/1
CAPSULE, DELAYED RELEASE ORAL
Qty: 90 CAPSULE | Refills: 2 | Status: SHIPPED | OUTPATIENT
Start: 2025-04-14

## 2025-07-14 ENCOUNTER — PATIENT OUTREACH (OUTPATIENT)
Dept: GERIATRIC MEDICINE | Facility: CLINIC | Age: 75
End: 2025-07-14
Payer: MEDICARE

## 2025-07-14 NOTE — PROGRESS NOTES
"[facility] Care Coordination Contact    Per CC, mailed client an \"Unable to Contact\" letter.    Allyson Herman    Care Management Specialist   [facility]  [phone]       "

## 2025-07-14 NOTE — LETTER
July 14, 2025    CHARLI DONOVAN LIPSCOMB  [address]   [place] [address]    Dear Charli:     I m your care coordinator. I ve been unable to reach you by phone. I am writing to ask you or your authorized representative to call me at [phone]. If you reach my voicemail, leave a message with your daytime phone number. Include a date and time that I can call you. If you are hearing impaired, call the [place] [employer] at 600 or [phone] ([id number] relay service).    The reason I am trying to reach you is:     [] To schedule an assessment  [x] For your six (6)-month check-in  [] Other:      Please call me as soon as you receive this letter. I look forward to speaking with you.    Sincerely,    Marifer Barboza RN, BSN, PHN  [phone]  [email or web address]                                                                   <[email or web address]>    (08/2024)

## 2025-07-16 ENCOUNTER — PATIENT OUTREACH (OUTPATIENT)
Dept: GERIATRIC MEDICINE | Facility: CLINIC | Age: 75
End: 2025-07-16
Payer: MEDICARE

## 2025-07-17 NOTE — PROGRESS NOTES
[facility] Care Coordination Contact      [facility] Six-Month Telephone Assessment  Completed 4 attempts to reach client with no response.    Will reach out in 6 months for an annual health risk assessment.   Marifer Barboza RN  [facility]  [phone]            
Post-Care Instructions: I reviewed with the patient in detail post-care instructions.
Detail Level: Zone
Post-Procedure Instructions: Following the dermaplane procedure, Oxymist treatment was applied to the treatment areas. Moisturizer and SPF was applied.
Blade: 10 blade scalpel
Price (Use Numbers Only, No Special Characters Or $): 120.00
Treatment Area Prep: acetone
Treatment Areas: face and neck
Pre-Procedure Text: The patient was placed in a recumbant position on the procedure table.

## 2025-08-03 ENCOUNTER — HEALTH MAINTENANCE LETTER (OUTPATIENT)
Age: 75
End: 2025-08-03

## 2025-08-06 ENCOUNTER — PATIENT OUTREACH (OUTPATIENT)
Dept: CARE COORDINATION | Facility: CLINIC | Age: 75
End: 2025-08-06
Payer: MEDICARE

## 2025-08-20 ENCOUNTER — PATIENT OUTREACH (OUTPATIENT)
Dept: CARE COORDINATION | Facility: CLINIC | Age: 75
End: 2025-08-20
Payer: MEDICARE

## (undated) DEVICE — KIT ENDO FIRST STEP DISINFECTANT 200ML W/POUCH EP-4

## (undated) DEVICE — PAD CHUX UNDERPAD 23X24" 7136

## (undated) DEVICE — PREP CHLORAPREP 26ML TINTED ORANGE  260815

## (undated) RX ORDER — LIDOCAINE HYDROCHLORIDE 10 MG/ML
INJECTION, SOLUTION EPIDURAL; INFILTRATION; INTRACAUDAL; PERINEURAL
Status: DISPENSED
Start: 2024-03-26

## (undated) RX ORDER — FENTANYL CITRATE 50 UG/ML
INJECTION, SOLUTION INTRAMUSCULAR; INTRAVENOUS
Status: DISPENSED
Start: 2020-03-03

## (undated) RX ORDER — SIMETHICONE 40MG/0.6ML
SUSPENSION, DROPS(FINAL DOSAGE FORM)(ML) ORAL
Status: DISPENSED
Start: 2020-03-03

## (undated) RX ORDER — TRIAMCINOLONE ACETONIDE 40 MG/ML
INJECTION, SUSPENSION INTRA-ARTICULAR; INTRAMUSCULAR
Status: DISPENSED
Start: 2024-03-26